# Patient Record
Sex: MALE | NOT HISPANIC OR LATINO | URBAN - METROPOLITAN AREA
[De-identification: names, ages, dates, MRNs, and addresses within clinical notes are randomized per-mention and may not be internally consistent; named-entity substitution may affect disease eponyms.]

---

## 2021-06-02 ENCOUNTER — TELEPHONE (OUTPATIENT)
Dept: GASTROENTEROLOGY | Facility: CLINIC | Age: 71
End: 2021-06-02

## 2021-06-08 RX ORDER — SODIUM, POTASSIUM,MAG SULFATES 17.5-3.13G
2 SOLUTION, RECONSTITUTED, ORAL ORAL TAKE AS DIRECTED
Qty: 354 ML | Refills: 0 | Status: SHIPPED | OUTPATIENT
Start: 2021-06-08

## 2021-06-24 ENCOUNTER — OUTSIDE FACILITY SERVICE (OUTPATIENT)
Dept: GASTROENTEROLOGY | Facility: CLINIC | Age: 71
End: 2021-06-24

## 2021-06-24 PROCEDURE — 88305 TISSUE EXAM BY PATHOLOGIST: CPT | Performed by: INTERNAL MEDICINE

## 2021-06-24 PROCEDURE — 45388 COLONOSCOPY W/ABLATION: CPT | Performed by: INTERNAL MEDICINE

## 2021-06-24 PROCEDURE — G0500 MOD SEDAT ENDO SERVICE >5YRS: HCPCS | Performed by: INTERNAL MEDICINE

## 2021-06-24 PROCEDURE — 45385 COLONOSCOPY W/LESION REMOVAL: CPT | Performed by: INTERNAL MEDICINE

## 2021-06-25 ENCOUNTER — LAB REQUISITION (OUTPATIENT)
Dept: LAB | Facility: HOSPITAL | Age: 71
End: 2021-06-25

## 2021-06-25 DIAGNOSIS — K57.30 DIVERTICULOSIS OF LARGE INTESTINE WITHOUT PERFORATION OR ABSCESS WITHOUT BLEEDING: ICD-10-CM

## 2021-06-25 DIAGNOSIS — D12.0 BENIGN NEOPLASM OF CECUM: ICD-10-CM

## 2021-06-25 DIAGNOSIS — Z12.11 ENCOUNTER FOR SCREENING FOR MALIGNANT NEOPLASM OF COLON: ICD-10-CM

## 2021-06-25 DIAGNOSIS — Z86.010 PERSONAL HISTORY OF COLONIC POLYPS: ICD-10-CM

## 2021-06-25 DIAGNOSIS — D12.4 BENIGN NEOPLASM OF DESCENDING COLON: ICD-10-CM

## 2021-06-28 ENCOUNTER — TELEPHONE (OUTPATIENT)
Dept: GASTROENTEROLOGY | Facility: CLINIC | Age: 71
End: 2021-06-28

## 2021-06-28 LAB
CYTO UR: NORMAL
LAB AP CASE REPORT: NORMAL
LAB AP CLINICAL INFORMATION: NORMAL
PATH REPORT.FINAL DX SPEC: NORMAL
PATH REPORT.GROSS SPEC: NORMAL

## 2021-06-28 NOTE — TELEPHONE ENCOUNTER
----- Message from Sergo Simpson MD sent at 6/28/2021 11:56 AM EDT -----  Please let Maxim know he had adenomas. Follow up in 5 years is recommended

## 2025-01-01 ENCOUNTER — HOSPITAL ENCOUNTER (INPATIENT)
Facility: HOSPITAL | Age: 75
LOS: 3 days | End: 2025-08-01
Attending: INTERNAL MEDICINE | Admitting: INTERNAL MEDICINE
Payer: COMMERCIAL

## 2025-01-01 ENCOUNTER — APPOINTMENT (OUTPATIENT)
Dept: CT IMAGING | Facility: HOSPITAL | Age: 75
DRG: 640 | End: 2025-01-01
Payer: MEDICARE

## 2025-01-01 ENCOUNTER — HOSPITAL ENCOUNTER (INPATIENT)
Facility: HOSPITAL | Age: 75
LOS: 8 days | Discharge: HOSPICE | DRG: 640 | End: 2025-07-29
Attending: EMERGENCY MEDICINE | Admitting: INTERNAL MEDICINE
Payer: MEDICARE

## 2025-01-01 ENCOUNTER — APPOINTMENT (OUTPATIENT)
Dept: GENERAL RADIOLOGY | Facility: HOSPITAL | Age: 75
DRG: 640 | End: 2025-01-01
Payer: MEDICARE

## 2025-01-01 VITALS
HEIGHT: 70 IN | TEMPERATURE: 99.6 F | DIASTOLIC BLOOD PRESSURE: 57 MMHG | OXYGEN SATURATION: 92 % | BODY MASS INDEX: 19.9 KG/M2 | HEART RATE: 78 BPM | WEIGHT: 139 LBS | RESPIRATION RATE: 16 BRPM | SYSTOLIC BLOOD PRESSURE: 80 MMHG

## 2025-01-01 VITALS — SYSTOLIC BLOOD PRESSURE: 105 MMHG | TEMPERATURE: 98.1 F | OXYGEN SATURATION: 94 % | DIASTOLIC BLOOD PRESSURE: 66 MMHG

## 2025-01-01 DIAGNOSIS — R41.841 COGNITIVE COMMUNICATION DEFICIT: ICD-10-CM

## 2025-01-01 DIAGNOSIS — S20.211A HEMATOMA OF RIGHT CHEST WALL, INITIAL ENCOUNTER: ICD-10-CM

## 2025-01-01 DIAGNOSIS — R29.6 FREQUENT FALLS: ICD-10-CM

## 2025-01-01 DIAGNOSIS — R13.10 DYSPHAGIA, UNSPECIFIED TYPE: ICD-10-CM

## 2025-01-01 DIAGNOSIS — R60.0 LEG EDEMA: ICD-10-CM

## 2025-01-01 DIAGNOSIS — J90 PLEURAL EFFUSION: ICD-10-CM

## 2025-01-01 DIAGNOSIS — D64.9 ANEMIA, UNSPECIFIED TYPE: Primary | ICD-10-CM

## 2025-01-01 DIAGNOSIS — K62.89 PROCTITIS: ICD-10-CM

## 2025-01-01 LAB
ABO GROUP BLD: NORMAL
ABO GROUP BLD: NORMAL
ALBUMIN SERPL-MCNC: 2.3 G/DL (ref 3.5–5.2)
ALBUMIN SERPL-MCNC: 2.6 G/DL (ref 3.5–5.2)
ALBUMIN SERPL-MCNC: 2.7 G/DL (ref 3.5–5.2)
ALBUMIN/GLOB SERPL: 1 G/DL
ALBUMIN/GLOB SERPL: 1.1 G/DL
ALBUMIN/GLOB SERPL: 1.1 G/DL
ALP SERPL-CCNC: 51 U/L (ref 39–117)
ALP SERPL-CCNC: 61 U/L (ref 39–117)
ALP SERPL-CCNC: 61 U/L (ref 39–117)
ALT SERPL W P-5'-P-CCNC: 30 U/L (ref 1–41)
ALT SERPL W P-5'-P-CCNC: 30 U/L (ref 1–41)
ALT SERPL W P-5'-P-CCNC: 41 U/L (ref 1–41)
ANION GAP SERPL CALCULATED.3IONS-SCNC: 10 MMOL/L (ref 5–15)
ANION GAP SERPL CALCULATED.3IONS-SCNC: 6 MMOL/L (ref 5–15)
ANION GAP SERPL CALCULATED.3IONS-SCNC: 6 MMOL/L (ref 5–15)
ANION GAP SERPL CALCULATED.3IONS-SCNC: 6.9 MMOL/L (ref 5–15)
ANION GAP SERPL CALCULATED.3IONS-SCNC: 7 MMOL/L (ref 5–15)
ANION GAP SERPL CALCULATED.3IONS-SCNC: 9.2 MMOL/L (ref 5–15)
ARTERIAL PATENCY WRIST A: POSITIVE
AST SERPL-CCNC: 33 U/L (ref 1–40)
AST SERPL-CCNC: 35 U/L (ref 1–40)
AST SERPL-CCNC: 45 U/L (ref 1–40)
ATMOSPHERIC PRESS: ABNORMAL MM[HG]
B PARAPERT DNA SPEC QL NAA+PROBE: NOT DETECTED
B PERT DNA SPEC QL NAA+PROBE: NOT DETECTED
BACTERIA UR QL AUTO: ABNORMAL /HPF
BASE EXCESS BLDA CALC-SCNC: 1.5 MMOL/L (ref 0–2)
BASOPHILS # BLD AUTO: 0.03 10*3/MM3 (ref 0–0.2)
BASOPHILS # BLD AUTO: 0.05 10*3/MM3 (ref 0–0.2)
BASOPHILS NFR BLD AUTO: 0.3 % (ref 0–1.5)
BASOPHILS NFR BLD AUTO: 0.5 % (ref 0–1.5)
BASOPHILS NFR BLD AUTO: 0.5 % (ref 0–1.5)
BASOPHILS NFR BLD AUTO: 0.6 % (ref 0–1.5)
BDY SITE: ABNORMAL
BH BB BLOOD EXPIRATION DATE: NORMAL
BH BB BLOOD EXPIRATION DATE: NORMAL
BH BB BLOOD TYPE BARCODE: 5100
BH BB BLOOD TYPE BARCODE: 5100
BH BB DISPENSE STATUS: NORMAL
BH BB DISPENSE STATUS: NORMAL
BH BB PRODUCT CODE: NORMAL
BH BB PRODUCT CODE: NORMAL
BH BB UNIT NUMBER: NORMAL
BH BB UNIT NUMBER: NORMAL
BILIRUB SERPL-MCNC: 0.6 MG/DL (ref 0–1.2)
BILIRUB SERPL-MCNC: 0.6 MG/DL (ref 0–1.2)
BILIRUB SERPL-MCNC: 0.8 MG/DL (ref 0–1.2)
BILIRUB UR QL STRIP: NEGATIVE
BLD GP AB SCN SERPL QL: NEGATIVE
BODY TEMPERATURE: 37
BUN SERPL-MCNC: 12 MG/DL (ref 8–23)
BUN SERPL-MCNC: 14.4 MG/DL (ref 8–23)
BUN SERPL-MCNC: 15.1 MG/DL (ref 8–23)
BUN SERPL-MCNC: 8.5 MG/DL (ref 8–23)
BUN SERPL-MCNC: 8.7 MG/DL (ref 8–23)
BUN SERPL-MCNC: 8.9 MG/DL (ref 8–23)
BUN/CREAT SERPL: 10.9 (ref 7–25)
BUN/CREAT SERPL: 11.5 (ref 7–25)
BUN/CREAT SERPL: 13.3 (ref 7–25)
BUN/CREAT SERPL: 16.2 (ref 7–25)
BUN/CREAT SERPL: 18.6 (ref 7–25)
BUN/CREAT SERPL: 18.9 (ref 7–25)
C PNEUM DNA NPH QL NAA+NON-PROBE: NOT DETECTED
CALCIUM SPEC-SCNC: 7.8 MG/DL (ref 8.6–10.5)
CALCIUM SPEC-SCNC: 7.9 MG/DL (ref 8.6–10.5)
CALCIUM SPEC-SCNC: 8 MG/DL (ref 8.6–10.5)
CALCIUM SPEC-SCNC: 8.3 MG/DL (ref 8.6–10.5)
CALCIUM SPEC-SCNC: 8.4 MG/DL (ref 8.6–10.5)
CALCIUM SPEC-SCNC: 8.7 MG/DL (ref 8.6–10.5)
CHLORIDE SERPL-SCNC: 109 MMOL/L (ref 98–107)
CHLORIDE SERPL-SCNC: 114 MMOL/L (ref 98–107)
CHLORIDE SERPL-SCNC: 117 MMOL/L (ref 98–107)
CHLORIDE SERPL-SCNC: 117 MMOL/L (ref 98–107)
CHLORIDE SERPL-SCNC: 118 MMOL/L (ref 98–107)
CHLORIDE SERPL-SCNC: 119 MMOL/L (ref 98–107)
CK SERPL-CCNC: 702 U/L (ref 20–200)
CLARITY UR: CLEAR
CO2 BLDA-SCNC: 26.6 MMOL/L (ref 22–33)
CO2 SERPL-SCNC: 20 MMOL/L (ref 22–29)
CO2 SERPL-SCNC: 21 MMOL/L (ref 22–29)
CO2 SERPL-SCNC: 22.1 MMOL/L (ref 22–29)
CO2 SERPL-SCNC: 24 MMOL/L (ref 22–29)
CO2 SERPL-SCNC: 24.8 MMOL/L (ref 22–29)
CO2 SERPL-SCNC: 26 MMOL/L (ref 22–29)
COHGB MFR BLD: 1.7 % (ref 0–2)
COLOR UR: YELLOW
CREAT SERPL-MCNC: 0.67 MG/DL (ref 0.76–1.27)
CREAT SERPL-MCNC: 0.74 MG/DL (ref 0.76–1.27)
CREAT SERPL-MCNC: 0.74 MG/DL (ref 0.76–1.27)
CREAT SERPL-MCNC: 0.76 MG/DL (ref 0.76–1.27)
CREAT SERPL-MCNC: 0.8 MG/DL (ref 0.76–1.27)
CREAT SERPL-MCNC: 0.81 MG/DL (ref 0.76–1.27)
CROSSMATCH INTERPRETATION: NORMAL
CROSSMATCH INTERPRETATION: NORMAL
CRP SERPL-MCNC: 6.42 MG/DL (ref 0–0.5)
D DIMER PPP FEU-MCNC: 1.91 MCGFEU/ML (ref 0–0.76)
D-LACTATE SERPL-SCNC: 0.9 MMOL/L (ref 0.5–2)
D-LACTATE SERPL-SCNC: 1.2 MMOL/L (ref 0.5–2)
DEPRECATED RDW RBC AUTO: 53.3 FL (ref 37–54)
DEPRECATED RDW RBC AUTO: 55.1 FL (ref 37–54)
DEPRECATED RDW RBC AUTO: 55.8 FL (ref 37–54)
DEPRECATED RDW RBC AUTO: 56.8 FL (ref 37–54)
DEPRECATED RDW RBC AUTO: 57.3 FL (ref 37–54)
DEVELOPER EXPIRATION DATE: NORMAL
DEVELOPER LOT NUMBER: NORMAL
EGFRCR SERPLBLD CKD-EPI 2021: 91.4 ML/MIN/1.73
EGFRCR SERPLBLD CKD-EPI 2021: 91.7 ML/MIN/1.73
EGFRCR SERPLBLD CKD-EPI 2021: 93.2 ML/MIN/1.73
EGFRCR SERPLBLD CKD-EPI 2021: 93.9 ML/MIN/1.73
EGFRCR SERPLBLD CKD-EPI 2021: 93.9 ML/MIN/1.73
EGFRCR SERPLBLD CKD-EPI 2021: 96.8 ML/MIN/1.73
EOSINOPHIL # BLD AUTO: 0.05 10*3/MM3 (ref 0–0.4)
EOSINOPHIL # BLD AUTO: 0.09 10*3/MM3 (ref 0–0.4)
EOSINOPHIL # BLD AUTO: 0.09 10*3/MM3 (ref 0–0.4)
EOSINOPHIL # BLD AUTO: 0.11 10*3/MM3 (ref 0–0.4)
EOSINOPHIL NFR BLD AUTO: 0.6 % (ref 0.3–6.2)
EOSINOPHIL NFR BLD AUTO: 1.2 % (ref 0.3–6.2)
EOSINOPHIL NFR BLD AUTO: 1.4 % (ref 0.3–6.2)
EOSINOPHIL NFR BLD AUTO: 1.6 % (ref 0.3–6.2)
EPAP: 0
ERYTHROCYTE [DISTWIDTH] IN BLOOD BY AUTOMATED COUNT: 14.6 % (ref 12.3–15.4)
ERYTHROCYTE [DISTWIDTH] IN BLOOD BY AUTOMATED COUNT: 14.7 % (ref 12.3–15.4)
ERYTHROCYTE [DISTWIDTH] IN BLOOD BY AUTOMATED COUNT: 15.2 % (ref 12.3–15.4)
ERYTHROCYTE [DISTWIDTH] IN BLOOD BY AUTOMATED COUNT: 15.9 % (ref 12.3–15.4)
ERYTHROCYTE [DISTWIDTH] IN BLOOD BY AUTOMATED COUNT: 16.1 % (ref 12.3–15.4)
EXPIRATION DATE: NORMAL
FECAL OCCULT BLOOD SCREEN, POC: NEGATIVE
FERRITIN SERPL-MCNC: 248 NG/ML (ref 30–400)
FLUAV SUBTYP SPEC NAA+PROBE: NOT DETECTED
FLUBV RNA NPH QL NAA+NON-PROBE: NOT DETECTED
FOLATE SERPL-MCNC: 4.19 NG/ML (ref 4.78–24.2)
GEN 5 1HR TROPONIN T REFLEX: 115 NG/L
GLOBULIN UR ELPH-MCNC: 2.1 GM/DL
GLOBULIN UR ELPH-MCNC: 2.5 GM/DL
GLOBULIN UR ELPH-MCNC: 2.5 GM/DL
GLUCOSE BLDC GLUCOMTR-MCNC: 104 MG/DL (ref 70–130)
GLUCOSE BLDC GLUCOMTR-MCNC: 108 MG/DL (ref 70–130)
GLUCOSE BLDC GLUCOMTR-MCNC: 151 MG/DL (ref 70–130)
GLUCOSE BLDC GLUCOMTR-MCNC: 63 MG/DL (ref 70–130)
GLUCOSE BLDC GLUCOMTR-MCNC: 68 MG/DL (ref 70–130)
GLUCOSE BLDC GLUCOMTR-MCNC: 69 MG/DL (ref 70–130)
GLUCOSE BLDC GLUCOMTR-MCNC: 70 MG/DL (ref 70–130)
GLUCOSE BLDC GLUCOMTR-MCNC: 70 MG/DL (ref 70–130)
GLUCOSE BLDC GLUCOMTR-MCNC: 72 MG/DL (ref 70–130)
GLUCOSE BLDC GLUCOMTR-MCNC: 73 MG/DL (ref 70–130)
GLUCOSE BLDC GLUCOMTR-MCNC: 77 MG/DL (ref 70–130)
GLUCOSE BLDC GLUCOMTR-MCNC: 77 MG/DL (ref 70–130)
GLUCOSE BLDC GLUCOMTR-MCNC: 79 MG/DL (ref 70–130)
GLUCOSE BLDC GLUCOMTR-MCNC: 79 MG/DL (ref 70–130)
GLUCOSE BLDC GLUCOMTR-MCNC: 80 MG/DL (ref 70–130)
GLUCOSE BLDC GLUCOMTR-MCNC: 88 MG/DL (ref 70–130)
GLUCOSE BLDC GLUCOMTR-MCNC: 93 MG/DL (ref 70–130)
GLUCOSE BLDC GLUCOMTR-MCNC: 95 MG/DL (ref 70–130)
GLUCOSE BLDC GLUCOMTR-MCNC: 99 MG/DL (ref 70–130)
GLUCOSE SERPL-MCNC: 101 MG/DL (ref 65–99)
GLUCOSE SERPL-MCNC: 78 MG/DL (ref 65–99)
GLUCOSE SERPL-MCNC: 86 MG/DL (ref 65–99)
GLUCOSE SERPL-MCNC: 90 MG/DL (ref 65–99)
GLUCOSE SERPL-MCNC: 96 MG/DL (ref 65–99)
GLUCOSE SERPL-MCNC: 97 MG/DL (ref 65–99)
GLUCOSE UR STRIP-MCNC: NEGATIVE MG/DL
HADV DNA SPEC NAA+PROBE: NOT DETECTED
HCO3 BLDA-SCNC: 25.4 MMOL/L (ref 20–26)
HCOV 229E RNA SPEC QL NAA+PROBE: NOT DETECTED
HCOV HKU1 RNA SPEC QL NAA+PROBE: NOT DETECTED
HCOV NL63 RNA SPEC QL NAA+PROBE: NOT DETECTED
HCOV OC43 RNA SPEC QL NAA+PROBE: NOT DETECTED
HCT VFR BLD AUTO: 22.1 % (ref 37.5–51)
HCT VFR BLD AUTO: 22.3 % (ref 37.5–51)
HCT VFR BLD AUTO: 22.6 % (ref 37.5–51)
HCT VFR BLD AUTO: 25.8 % (ref 37.5–51)
HCT VFR BLD AUTO: 27 % (ref 37.5–51)
HCT VFR BLD AUTO: 27.5 % (ref 37.5–51)
HCT VFR BLD AUTO: 28.3 % (ref 37.5–51)
HCT VFR BLD AUTO: 28.9 % (ref 37.5–51)
HCT VFR BLD AUTO: 29.2 % (ref 37.5–51)
HCT VFR BLD AUTO: 29.3 % (ref 37.5–51)
HCT VFR BLD AUTO: 29.4 % (ref 37.5–51)
HCT VFR BLD AUTO: 29.7 % (ref 37.5–51)
HCT VFR BLD AUTO: 30.8 % (ref 37.5–51)
HCT VFR BLD AUTO: 31.2 % (ref 37.5–51)
HCT VFR BLD AUTO: 31.3 % (ref 37.5–51)
HCT VFR BLD AUTO: 32 % (ref 37.5–51)
HCT VFR BLD AUTO: 32.2 % (ref 37.5–51)
HCT VFR BLD AUTO: 33 % (ref 37.5–51)
HCT VFR BLD AUTO: 33.5 % (ref 37.5–51)
HCT VFR BLD AUTO: 34.2 % (ref 37.5–51)
HCT VFR BLD CALC: 27.7 % (ref 38–51)
HGB BLD-MCNC: 10 G/DL (ref 13–17.7)
HGB BLD-MCNC: 10.2 G/DL (ref 13–17.7)
HGB BLD-MCNC: 10.6 G/DL (ref 13–17.7)
HGB BLD-MCNC: 10.7 G/DL (ref 13–17.7)
HGB BLD-MCNC: 10.9 G/DL (ref 13–17.7)
HGB BLD-MCNC: 11.1 G/DL (ref 13–17.7)
HGB BLD-MCNC: 6.8 G/DL (ref 13–17.7)
HGB BLD-MCNC: 6.9 G/DL (ref 13–17.7)
HGB BLD-MCNC: 6.9 G/DL (ref 13–17.7)
HGB BLD-MCNC: 8.2 G/DL (ref 13–17.7)
HGB BLD-MCNC: 8.4 G/DL (ref 13–17.7)
HGB BLD-MCNC: 8.8 G/DL (ref 13–17.7)
HGB BLD-MCNC: 9.2 G/DL (ref 13–17.7)
HGB BLD-MCNC: 9.2 G/DL (ref 13–17.7)
HGB BLD-MCNC: 9.4 G/DL (ref 13–17.7)
HGB BLD-MCNC: 9.5 G/DL (ref 13–17.7)
HGB BLD-MCNC: 9.6 G/DL (ref 13–17.7)
HGB BLD-MCNC: 9.7 G/DL (ref 13–17.7)
HGB BLDA-MCNC: 9.1 G/DL (ref 13.5–17.5)
HGB UR QL STRIP.AUTO: NEGATIVE
HMPV RNA NPH QL NAA+NON-PROBE: NOT DETECTED
HPIV1 RNA ISLT QL NAA+PROBE: NOT DETECTED
HPIV2 RNA SPEC QL NAA+PROBE: NOT DETECTED
HPIV3 RNA NPH QL NAA+PROBE: NOT DETECTED
HPIV4 P GENE NPH QL NAA+PROBE: NOT DETECTED
HYALINE CASTS UR QL AUTO: ABNORMAL /LPF
IMM GRANULOCYTES # BLD AUTO: 0.02 10*3/MM3 (ref 0–0.05)
IMM GRANULOCYTES # BLD AUTO: 0.03 10*3/MM3 (ref 0–0.05)
IMM GRANULOCYTES # BLD AUTO: 0.04 10*3/MM3 (ref 0–0.05)
IMM GRANULOCYTES # BLD AUTO: 0.04 10*3/MM3 (ref 0–0.05)
IMM GRANULOCYTES NFR BLD AUTO: 0.4 % (ref 0–0.5)
IMM GRANULOCYTES NFR BLD AUTO: 0.4 % (ref 0–0.5)
IMM GRANULOCYTES NFR BLD AUTO: 0.5 % (ref 0–0.5)
IMM GRANULOCYTES NFR BLD AUTO: 0.5 % (ref 0–0.5)
INHALED O2 CONCENTRATION: 44 %
IPAP: 0
IRON 24H UR-MRATE: 14 MCG/DL (ref 59–158)
IRON 24H UR-MRATE: 17 MCG/DL (ref 59–158)
IRON SATN MFR SERPL: 7 % (ref 20–50)
KETONES UR QL STRIP: ABNORMAL
LEUKOCYTE ESTERASE UR QL STRIP.AUTO: ABNORMAL
LYMPHOCYTES # BLD AUTO: 0.68 10*3/MM3 (ref 0.7–3.1)
LYMPHOCYTES # BLD AUTO: 0.72 10*3/MM3 (ref 0.7–3.1)
LYMPHOCYTES # BLD AUTO: 0.72 10*3/MM3 (ref 0.7–3.1)
LYMPHOCYTES # BLD AUTO: 0.74 10*3/MM3 (ref 0.7–3.1)
LYMPHOCYTES NFR BLD AUTO: 11.3 % (ref 19.6–45.3)
LYMPHOCYTES NFR BLD AUTO: 12.7 % (ref 19.6–45.3)
LYMPHOCYTES NFR BLD AUTO: 8.1 % (ref 19.6–45.3)
LYMPHOCYTES NFR BLD AUTO: 8.1 % (ref 19.6–45.3)
Lab: NORMAL
M PNEUMO IGG SER IA-ACNC: NOT DETECTED
MAGNESIUM SERPL-MCNC: 2.2 MG/DL (ref 1.6–2.4)
MAGNESIUM SERPL-MCNC: 2.5 MG/DL (ref 1.6–2.4)
MCH RBC QN AUTO: 30.5 PG (ref 26.6–33)
MCH RBC QN AUTO: 31 PG (ref 26.6–33)
MCH RBC QN AUTO: 31.1 PG (ref 26.6–33)
MCH RBC QN AUTO: 31.2 PG (ref 26.6–33)
MCH RBC QN AUTO: 31.8 PG (ref 26.6–33)
MCHC RBC AUTO-ENTMCNC: 30.1 G/DL (ref 31.5–35.7)
MCHC RBC AUTO-ENTMCNC: 31.1 G/DL (ref 31.5–35.7)
MCHC RBC AUTO-ENTMCNC: 31.5 G/DL (ref 31.5–35.7)
MCHC RBC AUTO-ENTMCNC: 32 G/DL (ref 31.5–35.7)
MCHC RBC AUTO-ENTMCNC: 32.5 G/DL (ref 31.5–35.7)
MCV RBC AUTO: 102.3 FL (ref 79–97)
MCV RBC AUTO: 103.7 FL (ref 79–97)
MCV RBC AUTO: 95.5 FL (ref 79–97)
MCV RBC AUTO: 96.9 FL (ref 79–97)
MCV RBC AUTO: 97.2 FL (ref 79–97)
METHGB BLD QL: 0.3 % (ref 0–1.5)
MODALITY: ABNORMAL
MONOCYTES # BLD AUTO: 0.73 10*3/MM3 (ref 0.1–0.9)
MONOCYTES # BLD AUTO: 0.74 10*3/MM3 (ref 0.1–0.9)
MONOCYTES # BLD AUTO: 0.85 10*3/MM3 (ref 0.1–0.9)
MONOCYTES # BLD AUTO: 0.85 10*3/MM3 (ref 0.1–0.9)
MONOCYTES NFR BLD AUTO: 10.2 % (ref 5–12)
MONOCYTES NFR BLD AUTO: 11.5 % (ref 5–12)
MONOCYTES NFR BLD AUTO: 13 % (ref 5–12)
MONOCYTES NFR BLD AUTO: 9.3 % (ref 5–12)
NEGATIVE CONTROL: NEGATIVE
NEUTROPHILS NFR BLD AUTO: 4.08 10*3/MM3 (ref 1.7–7)
NEUTROPHILS NFR BLD AUTO: 4.76 10*3/MM3 (ref 1.7–7)
NEUTROPHILS NFR BLD AUTO: 6.68 10*3/MM3 (ref 1.7–7)
NEUTROPHILS NFR BLD AUTO: 7.41 10*3/MM3 (ref 1.7–7)
NEUTROPHILS NFR BLD AUTO: 71.8 % (ref 42.7–76)
NEUTROPHILS NFR BLD AUTO: 74.8 % (ref 42.7–76)
NEUTROPHILS NFR BLD AUTO: 80 % (ref 42.7–76)
NEUTROPHILS NFR BLD AUTO: 80.7 % (ref 42.7–76)
NITRITE UR QL STRIP: NEGATIVE
NRBC BLD AUTO-RTO: 0 /100 WBC (ref 0–0.2)
NT-PROBNP SERPL-MCNC: 345 PG/ML (ref 0–1800)
OXYHGB MFR BLDV: 85.9 % (ref 94–99)
PAW @ PEAK INSP FLOW SETTING VENT: 0 CMH2O
PCO2 BLDA: 36.5 MM HG (ref 35–45)
PCO2 TEMP ADJ BLD: 36.5 MM HG (ref 35–48)
PH BLDA: 7.45 PH UNITS (ref 7.35–7.45)
PH UR STRIP.AUTO: 5.5 [PH] (ref 5–8)
PH, TEMP CORRECTED: 7.45 PH UNITS
PLATELET # BLD AUTO: 197 10*3/MM3 (ref 140–450)
PLATELET # BLD AUTO: 205 10*3/MM3 (ref 140–450)
PLATELET # BLD AUTO: 226 10*3/MM3 (ref 140–450)
PLATELET # BLD AUTO: 244 10*3/MM3 (ref 140–450)
PLATELET # BLD AUTO: 251 10*3/MM3 (ref 140–450)
PMV BLD AUTO: 9.2 FL (ref 6–12)
PMV BLD AUTO: 9.3 FL (ref 6–12)
PMV BLD AUTO: 9.6 FL (ref 6–12)
PMV BLD AUTO: 9.7 FL (ref 6–12)
PMV BLD AUTO: 9.7 FL (ref 6–12)
PO2 BLDA: 51.4 MM HG (ref 83–108)
PO2 TEMP ADJ BLD: 51.4 MM HG (ref 83–108)
POSITIVE CONTROL: POSITIVE
POTASSIUM SERPL-SCNC: 3.1 MMOL/L (ref 3.5–5.2)
POTASSIUM SERPL-SCNC: 3.2 MMOL/L (ref 3.5–5.2)
POTASSIUM SERPL-SCNC: 3.5 MMOL/L (ref 3.5–5.2)
POTASSIUM SERPL-SCNC: 3.7 MMOL/L (ref 3.5–5.2)
POTASSIUM SERPL-SCNC: 4.1 MMOL/L (ref 3.5–5.2)
POTASSIUM SERPL-SCNC: 4.6 MMOL/L (ref 3.5–5.2)
POTASSIUM SERPL-SCNC: 4.8 MMOL/L (ref 3.5–5.2)
PROCALCITONIN SERPL-MCNC: 0.11 NG/ML (ref 0–0.25)
PROCALCITONIN SERPL-MCNC: 0.12 NG/ML (ref 0–0.25)
PROT SERPL-MCNC: 4.4 G/DL (ref 6–8.5)
PROT SERPL-MCNC: 5.1 G/DL (ref 6–8.5)
PROT SERPL-MCNC: 5.2 G/DL (ref 6–8.5)
PROT UR QL STRIP: ABNORMAL
QT INTERVAL: 338 MS
QT INTERVAL: 418 MS
QTC INTERVAL: 359 MS
QTC INTERVAL: 420 MS
RBC # BLD AUTO: 2.18 10*6/MM3 (ref 4.14–5.8)
RBC # BLD AUTO: 2.64 10*6/MM3 (ref 4.14–5.8)
RBC # BLD AUTO: 2.83 10*6/MM3 (ref 4.14–5.8)
RBC # BLD AUTO: 3.18 10*6/MM3 (ref 4.14–5.8)
RBC # BLD AUTO: 3.58 10*6/MM3 (ref 4.14–5.8)
RBC # UR STRIP: ABNORMAL /HPF
REF LAB TEST METHOD: ABNORMAL
RETICS # AUTO: 0.06 10*6/MM3 (ref 0.02–0.13)
RETICS/RBC NFR AUTO: 2.37 % (ref 0.7–1.9)
RH BLD: POSITIVE
RH BLD: POSITIVE
RHINOVIRUS RNA SPEC NAA+PROBE: NOT DETECTED
RSV RNA NPH QL NAA+NON-PROBE: NOT DETECTED
SARS-COV-2 RNA RESP QL NAA+PROBE: NOT DETECTED
SODIUM SERPL-SCNC: 137 MMOL/L (ref 136–145)
SODIUM SERPL-SCNC: 137 MMOL/L (ref 136–145)
SODIUM SERPL-SCNC: 138 MMOL/L (ref 136–145)
SODIUM SERPL-SCNC: 139 MMOL/L (ref 136–145)
SODIUM SERPL-SCNC: 140 MMOL/L (ref 136–145)
SODIUM SERPL-SCNC: 142 MMOL/L (ref 136–145)
SODIUM SERPL-SCNC: 143 MMOL/L (ref 136–145)
SODIUM SERPL-SCNC: 144 MMOL/L (ref 136–145)
SODIUM SERPL-SCNC: 145 MMOL/L (ref 136–145)
SODIUM SERPL-SCNC: 145 MMOL/L (ref 136–145)
SODIUM SERPL-SCNC: 147 MMOL/L (ref 136–145)
SODIUM SERPL-SCNC: 148 MMOL/L (ref 136–145)
SODIUM SERPL-SCNC: 148 MMOL/L (ref 136–145)
SODIUM SERPL-SCNC: 149 MMOL/L (ref 136–145)
SODIUM SERPL-SCNC: 152 MMOL/L (ref 136–145)
SP GR UR STRIP: 1.02 (ref 1–1.03)
SQUAMOUS #/AREA URNS HPF: ABNORMAL /HPF
T&S EXPIRATION DATE: NORMAL
T4 FREE SERPL-MCNC: 1.21 NG/DL (ref 0.92–1.68)
TIBC SERPL-MCNC: 188 MCG/DL (ref 298–536)
TOTAL RATE: 0 BREATHS/MINUTE
TRANSFERRIN SERPL-MCNC: 126 MG/DL (ref 200–360)
TROPONIN T % DELTA: -19
TROPONIN T NUMERIC DELTA: -27 NG/L
TROPONIN T SERPL HS-MCNC: 142 NG/L
TSH SERPL DL<=0.05 MIU/L-ACNC: 17.6 UIU/ML (ref 0.27–4.2)
UNIT  ABO: NORMAL
UNIT  ABO: NORMAL
UNIT  RH: NORMAL
UNIT  RH: NORMAL
UROBILINOGEN UR QL STRIP: ABNORMAL
VIT B12 BLD-MCNC: 1385 PG/ML (ref 211–946)
WBC # UR STRIP: ABNORMAL /HPF
WBC NRBC COR # BLD AUTO: 5.68 10*3/MM3 (ref 3.4–10.8)
WBC NRBC COR # BLD AUTO: 6.36 10*3/MM3 (ref 3.4–10.8)
WBC NRBC COR # BLD AUTO: 8.35 10*3/MM3 (ref 3.4–10.8)
WBC NRBC COR # BLD AUTO: 9.18 10*3/MM3 (ref 3.4–10.8)
WBC NRBC COR # BLD AUTO: 9.76 10*3/MM3 (ref 3.4–10.8)

## 2025-01-01 PROCEDURE — 82728 ASSAY OF FERRITIN: CPT | Performed by: NURSE PRACTITIONER

## 2025-01-01 PROCEDURE — 85018 HEMOGLOBIN: CPT | Performed by: NURSE PRACTITIONER

## 2025-01-01 PROCEDURE — 83605 ASSAY OF LACTIC ACID: CPT | Performed by: NURSE PRACTITIONER

## 2025-01-01 PROCEDURE — 94799 UNLISTED PULMONARY SVC/PX: CPT

## 2025-01-01 PROCEDURE — 25010000003 DEXTROSE 5 % SOLUTION: Performed by: INTERNAL MEDICINE

## 2025-01-01 PROCEDURE — 85045 AUTOMATED RETICULOCYTE COUNT: CPT | Performed by: NURSE PRACTITIONER

## 2025-01-01 PROCEDURE — 84439 ASSAY OF FREE THYROXINE: CPT | Performed by: NURSE PRACTITIONER

## 2025-01-01 PROCEDURE — 86900 BLOOD TYPING SEROLOGIC ABO: CPT

## 2025-01-01 PROCEDURE — 25010000002 GLYCOPYRROLATE 0.2 MG/ML SOLUTION: Performed by: NURSE PRACTITIONER

## 2025-01-01 PROCEDURE — 86923 COMPATIBILITY TEST ELECTRIC: CPT

## 2025-01-01 PROCEDURE — 92523 SPEECH SOUND LANG COMPREHEN: CPT

## 2025-01-01 PROCEDURE — 93005 ELECTROCARDIOGRAM TRACING: CPT | Performed by: NURSE PRACTITIONER

## 2025-01-01 PROCEDURE — 25010000002 PIPERACILLIN SOD-TAZOBACTAM PER 1 G: Performed by: STUDENT IN AN ORGANIZED HEALTH CARE EDUCATION/TRAINING PROGRAM

## 2025-01-01 PROCEDURE — 85379 FIBRIN DEGRADATION QUANT: CPT | Performed by: STUDENT IN AN ORGANIZED HEALTH CARE EDUCATION/TRAINING PROGRAM

## 2025-01-01 PROCEDURE — 25010000002 MIDAZOLAM PER 1 MG: Performed by: FAMILY MEDICINE

## 2025-01-01 PROCEDURE — 84145 PROCALCITONIN (PCT): CPT | Performed by: NURSE PRACTITIONER

## 2025-01-01 PROCEDURE — 99232 SBSQ HOSP IP/OBS MODERATE 35: CPT | Performed by: HOSPITALIST

## 2025-01-01 PROCEDURE — 0202U NFCT DS 22 TRGT SARS-COV-2: CPT | Performed by: NURSE PRACTITIONER

## 2025-01-01 PROCEDURE — 85014 HEMATOCRIT: CPT | Performed by: NURSE PRACTITIONER

## 2025-01-01 PROCEDURE — 82948 REAGENT STRIP/BLOOD GLUCOSE: CPT

## 2025-01-01 PROCEDURE — 86140 C-REACTIVE PROTEIN: CPT | Performed by: NURSE PRACTITIONER

## 2025-01-01 PROCEDURE — 25010000002 POTASSIUM CHLORIDE 10 MEQ/100ML SOLUTION: Performed by: HOSPITALIST

## 2025-01-01 PROCEDURE — 25010000002 MORPHINE PER 10 MG: Performed by: INTERNAL MEDICINE

## 2025-01-01 PROCEDURE — 84145 PROCALCITONIN (PCT): CPT | Performed by: STUDENT IN AN ORGANIZED HEALTH CARE EDUCATION/TRAINING PROGRAM

## 2025-01-01 PROCEDURE — 81001 URINALYSIS AUTO W/SCOPE: CPT | Performed by: NURSE PRACTITIONER

## 2025-01-01 PROCEDURE — 85025 COMPLETE CBC W/AUTO DIFF WBC: CPT | Performed by: INTERNAL MEDICINE

## 2025-01-01 PROCEDURE — 86850 RBC ANTIBODY SCREEN: CPT | Performed by: NURSE PRACTITIONER

## 2025-01-01 PROCEDURE — 83735 ASSAY OF MAGNESIUM: CPT | Performed by: NURSE PRACTITIONER

## 2025-01-01 PROCEDURE — 84295 ASSAY OF SERUM SODIUM: CPT | Performed by: FAMILY MEDICINE

## 2025-01-01 PROCEDURE — 70450 CT HEAD/BRAIN W/O DYE: CPT

## 2025-01-01 PROCEDURE — 82375 ASSAY CARBOXYHB QUANT: CPT

## 2025-01-01 PROCEDURE — 99232 SBSQ HOSP IP/OBS MODERATE 35: CPT | Performed by: INTERNAL MEDICINE

## 2025-01-01 PROCEDURE — 25010000002 MORPHINE PER 10 MG: Performed by: NURSE PRACTITIONER

## 2025-01-01 PROCEDURE — 25010000002 FUROSEMIDE PER 20 MG: Performed by: NURSE PRACTITIONER

## 2025-01-01 PROCEDURE — 92610 EVALUATE SWALLOWING FUNCTION: CPT

## 2025-01-01 PROCEDURE — 71250 CT THORAX DX C-: CPT

## 2025-01-01 PROCEDURE — 82607 VITAMIN B-12: CPT | Performed by: NURSE PRACTITIONER

## 2025-01-01 PROCEDURE — 86901 BLOOD TYPING SEROLOGIC RH(D): CPT | Performed by: NURSE PRACTITIONER

## 2025-01-01 PROCEDURE — 99233 SBSQ HOSP IP/OBS HIGH 50: CPT | Performed by: INTERNAL MEDICINE

## 2025-01-01 PROCEDURE — P9016 RBC LEUKOCYTES REDUCED: HCPCS

## 2025-01-01 PROCEDURE — 82805 BLOOD GASES W/O2 SATURATION: CPT

## 2025-01-01 PROCEDURE — 25010000002 GLYCOPYRROLATE 0.2 MG/ML SOLUTION: Performed by: FAMILY MEDICINE

## 2025-01-01 PROCEDURE — 83880 ASSAY OF NATRIURETIC PEPTIDE: CPT | Performed by: NURSE PRACTITIONER

## 2025-01-01 PROCEDURE — 99238 HOSP IP/OBS DSCHRG MGMT 30/<: CPT | Performed by: INTERNAL MEDICINE

## 2025-01-01 PROCEDURE — 36415 COLL VENOUS BLD VENIPUNCTURE: CPT

## 2025-01-01 PROCEDURE — 99231 SBSQ HOSP IP/OBS SF/LOW 25: CPT | Performed by: INTERNAL MEDICINE

## 2025-01-01 PROCEDURE — 85025 COMPLETE CBC W/AUTO DIFF WBC: CPT | Performed by: NURSE PRACTITIONER

## 2025-01-01 PROCEDURE — 83050 HGB METHEMOGLOBIN QUAN: CPT

## 2025-01-01 PROCEDURE — 36430 TRANSFUSION BLD/BLD COMPNT: CPT

## 2025-01-01 PROCEDURE — 80048 BASIC METABOLIC PNL TOTAL CA: CPT | Performed by: INTERNAL MEDICINE

## 2025-01-01 PROCEDURE — 86901 BLOOD TYPING SEROLOGIC RH(D): CPT

## 2025-01-01 PROCEDURE — 99223 1ST HOSP IP/OBS HIGH 75: CPT | Performed by: NURSE PRACTITIONER

## 2025-01-01 PROCEDURE — 82270 OCCULT BLOOD FECES: CPT | Performed by: NURSE PRACTITIONER

## 2025-01-01 PROCEDURE — 71045 X-RAY EXAM CHEST 1 VIEW: CPT

## 2025-01-01 PROCEDURE — P9612 CATHETERIZE FOR URINE SPEC: HCPCS

## 2025-01-01 PROCEDURE — 94660 CPAP INITIATION&MGMT: CPT

## 2025-01-01 PROCEDURE — 82550 ASSAY OF CK (CPK): CPT | Performed by: NURSE PRACTITIONER

## 2025-01-01 PROCEDURE — 25810000003 SODIUM CHLORIDE 0.9 % SOLUTION: Performed by: NURSE PRACTITIONER

## 2025-01-01 PROCEDURE — 36600 WITHDRAWAL OF ARTERIAL BLOOD: CPT

## 2025-01-01 PROCEDURE — 84132 ASSAY OF SERUM POTASSIUM: CPT | Performed by: HOSPITALIST

## 2025-01-01 PROCEDURE — 80053 COMPREHEN METABOLIC PANEL: CPT | Performed by: NURSE PRACTITIONER

## 2025-01-01 PROCEDURE — 82746 ASSAY OF FOLIC ACID SERUM: CPT | Performed by: NURSE PRACTITIONER

## 2025-01-01 PROCEDURE — 86900 BLOOD TYPING SEROLOGIC ABO: CPT | Performed by: NURSE PRACTITIONER

## 2025-01-01 PROCEDURE — 80053 COMPREHEN METABOLIC PANEL: CPT | Performed by: FAMILY MEDICINE

## 2025-01-01 PROCEDURE — 85027 COMPLETE CBC AUTOMATED: CPT | Performed by: HOSPITALIST

## 2025-01-01 PROCEDURE — 84484 ASSAY OF TROPONIN QUANT: CPT | Performed by: NURSE PRACTITIONER

## 2025-01-01 PROCEDURE — 74176 CT ABD & PELVIS W/O CONTRAST: CPT

## 2025-01-01 PROCEDURE — 25010000002 MIDAZOLAM PER 1 MG: Performed by: NURSE PRACTITIONER

## 2025-01-01 PROCEDURE — 94761 N-INVAS EAR/PLS OXIMETRY MLT: CPT

## 2025-01-01 PROCEDURE — 99285 EMERGENCY DEPT VISIT HI MDM: CPT

## 2025-01-01 PROCEDURE — 80053 COMPREHEN METABOLIC PANEL: CPT | Performed by: HOSPITALIST

## 2025-01-01 PROCEDURE — 83540 ASSAY OF IRON: CPT | Performed by: NURSE PRACTITIONER

## 2025-01-01 PROCEDURE — 93010 ELECTROCARDIOGRAM REPORT: CPT | Performed by: INTERNAL MEDICINE

## 2025-01-01 PROCEDURE — 94640 AIRWAY INHALATION TREATMENT: CPT

## 2025-01-01 PROCEDURE — 25010000002 MORPHINE PER 10 MG: Performed by: FAMILY MEDICINE

## 2025-01-01 PROCEDURE — 84466 ASSAY OF TRANSFERRIN: CPT | Performed by: NURSE PRACTITIONER

## 2025-01-01 PROCEDURE — 84295 ASSAY OF SERUM SODIUM: CPT | Performed by: NURSE PRACTITIONER

## 2025-01-01 PROCEDURE — 84443 ASSAY THYROID STIM HORMONE: CPT | Performed by: NURSE PRACTITIONER

## 2025-01-01 RX ORDER — MORPHINE SULFATE 2 MG/ML
2 INJECTION, SOLUTION INTRAMUSCULAR; INTRAVENOUS
Status: DISCONTINUED | OUTPATIENT
Start: 2025-01-01 | End: 2025-01-01

## 2025-01-01 RX ORDER — IPRATROPIUM BROMIDE AND ALBUTEROL SULFATE 2.5; .5 MG/3ML; MG/3ML
3 SOLUTION RESPIRATORY (INHALATION) EVERY 4 HOURS PRN
Status: CANCELLED | OUTPATIENT
Start: 2025-01-01

## 2025-01-01 RX ORDER — IBUPROFEN 600 MG/1
1 TABLET ORAL
Status: DISCONTINUED | OUTPATIENT
Start: 2025-01-01 | End: 2025-01-01 | Stop reason: HOSPADM

## 2025-01-01 RX ORDER — DIAZEPAM 10 MG/2ML
5 INJECTION, SOLUTION INTRAMUSCULAR; INTRAVENOUS EVERY 6 HOURS PRN
Status: DISCONTINUED | OUTPATIENT
Start: 2025-01-01 | End: 2025-01-01

## 2025-01-01 RX ORDER — SODIUM CHLORIDE 0.9 % (FLUSH) 0.9 %
10 SYRINGE (ML) INJECTION EVERY 12 HOURS SCHEDULED
Status: DISCONTINUED | OUTPATIENT
Start: 2025-01-01 | End: 2025-01-01 | Stop reason: HOSPADM

## 2025-01-01 RX ORDER — ACETAMINOPHEN 160 MG/5ML
650 SOLUTION ORAL EVERY 4 HOURS PRN
Status: DISCONTINUED | OUTPATIENT
Start: 2025-01-01 | End: 2025-08-02 | Stop reason: HOSPADM

## 2025-01-01 RX ORDER — SODIUM CHLORIDE 0.9 % (FLUSH) 0.9 %
10 SYRINGE (ML) INJECTION AS NEEDED
Status: DISCONTINUED | OUTPATIENT
Start: 2025-01-01 | End: 2025-01-01

## 2025-01-01 RX ORDER — MIDAZOLAM HYDROCHLORIDE 1 MG/ML
1 INJECTION, SOLUTION INTRAMUSCULAR; INTRAVENOUS EVERY 4 HOURS
Status: DISCONTINUED | OUTPATIENT
Start: 2025-01-01 | End: 2025-08-02 | Stop reason: HOSPADM

## 2025-01-01 RX ORDER — NITROGLYCERIN 0.4 MG/1
0.4 TABLET SUBLINGUAL
Status: DISCONTINUED | OUTPATIENT
Start: 2025-01-01 | End: 2025-01-01

## 2025-01-01 RX ORDER — ACETAMINOPHEN 325 MG/1
650 TABLET ORAL EVERY 4 HOURS PRN
Status: DISCONTINUED | OUTPATIENT
Start: 2025-01-01 | End: 2025-08-02 | Stop reason: HOSPADM

## 2025-01-01 RX ORDER — POTASSIUM CHLORIDE 7.45 MG/ML
10 INJECTION INTRAVENOUS
Status: COMPLETED | OUTPATIENT
Start: 2025-01-01 | End: 2025-01-01

## 2025-01-01 RX ORDER — DIAZEPAM 10 MG/2ML
5 INJECTION, SOLUTION INTRAMUSCULAR; INTRAVENOUS EVERY 6 HOURS PRN
Status: CANCELLED | OUTPATIENT
Start: 2025-01-01 | End: 2025-01-01

## 2025-01-01 RX ORDER — POLYETHYLENE GLYCOL 3350 17 G/17G
17 POWDER, FOR SOLUTION ORAL DAILY PRN
Status: DISCONTINUED | OUTPATIENT
Start: 2025-01-01 | End: 2025-01-01 | Stop reason: HOSPADM

## 2025-01-01 RX ORDER — SCOPOLAMINE 1 MG/3D
1 PATCH, EXTENDED RELEASE TRANSDERMAL
Status: DISCONTINUED | OUTPATIENT
Start: 2025-01-01 | End: 2025-08-02 | Stop reason: HOSPADM

## 2025-01-01 RX ORDER — SODIUM CHLORIDE 9 MG/ML
40 INJECTION, SOLUTION INTRAVENOUS AS NEEDED
Status: DISCONTINUED | OUTPATIENT
Start: 2025-01-01 | End: 2025-01-01 | Stop reason: HOSPADM

## 2025-01-01 RX ORDER — MIRTAZAPINE 15 MG/1
15 TABLET, FILM COATED ORAL NIGHTLY
Status: DISCONTINUED | OUTPATIENT
Start: 2025-01-01 | End: 2025-01-01 | Stop reason: HOSPADM

## 2025-01-01 RX ORDER — ONDANSETRON 2 MG/ML
4 INJECTION INTRAMUSCULAR; INTRAVENOUS EVERY 6 HOURS PRN
Status: DISCONTINUED | OUTPATIENT
Start: 2025-01-01 | End: 2025-08-02 | Stop reason: HOSPADM

## 2025-01-01 RX ORDER — LEVOTHYROXINE SODIUM 137 UG/1
137 TABLET ORAL NIGHTLY
COMMUNITY

## 2025-01-01 RX ORDER — GLYCOPYRROLATE 0.2 MG/ML
0.4 INJECTION INTRAMUSCULAR; INTRAVENOUS EVERY 4 HOURS PRN
Status: DISCONTINUED | OUTPATIENT
Start: 2025-01-01 | End: 2025-08-02 | Stop reason: HOSPADM

## 2025-01-01 RX ORDER — GLYCOPYRROLATE 0.2 MG/ML
0.4 INJECTION INTRAMUSCULAR; INTRAVENOUS EVERY 4 HOURS
Status: DISCONTINUED | OUTPATIENT
Start: 2025-01-01 | End: 2025-08-02 | Stop reason: HOSPADM

## 2025-01-01 RX ORDER — ACETAMINOPHEN 650 MG/1
650 SUPPOSITORY RECTAL EVERY 4 HOURS PRN
Status: DISCONTINUED | OUTPATIENT
Start: 2025-01-01 | End: 2025-08-02 | Stop reason: HOSPADM

## 2025-01-01 RX ORDER — OLANZAPINE 5 MG/1
5 TABLET, ORALLY DISINTEGRATING ORAL NIGHTLY
Status: CANCELLED | OUTPATIENT
Start: 2025-01-01

## 2025-01-01 RX ORDER — MORPHINE SULFATE 2 MG/ML
2 INJECTION, SOLUTION INTRAMUSCULAR; INTRAVENOUS
Status: DISCONTINUED | OUTPATIENT
Start: 2025-01-01 | End: 2025-08-02 | Stop reason: HOSPADM

## 2025-01-01 RX ORDER — MORPHINE SULFATE 2 MG/ML
2 INJECTION, SOLUTION INTRAMUSCULAR; INTRAVENOUS
Status: DISCONTINUED | OUTPATIENT
Start: 2025-01-01 | End: 2025-01-01 | Stop reason: HOSPADM

## 2025-01-01 RX ORDER — SODIUM CHLORIDE 0.9 % (FLUSH) 0.9 %
10 SYRINGE (ML) INJECTION EVERY 12 HOURS SCHEDULED
Status: DISCONTINUED | OUTPATIENT
Start: 2025-01-01 | End: 2025-08-02 | Stop reason: HOSPADM

## 2025-01-01 RX ORDER — MORPHINE SULFATE 2 MG/ML
2 INJECTION, SOLUTION INTRAMUSCULAR; INTRAVENOUS
Status: CANCELLED | OUTPATIENT
Start: 2025-01-01 | End: 2025-08-02

## 2025-01-01 RX ORDER — MIDAZOLAM HYDROCHLORIDE 1 MG/ML
0.5 INJECTION, SOLUTION INTRAMUSCULAR; INTRAVENOUS
Status: DISCONTINUED | OUTPATIENT
Start: 2025-01-01 | End: 2025-01-01

## 2025-01-01 RX ORDER — MIDAZOLAM HYDROCHLORIDE 1 MG/ML
1 INJECTION, SOLUTION INTRAMUSCULAR; INTRAVENOUS
Status: DISCONTINUED | OUTPATIENT
Start: 2025-01-01 | End: 2025-08-02 | Stop reason: HOSPADM

## 2025-01-01 RX ORDER — MORPHINE SULFATE 2 MG/ML
2 INJECTION, SOLUTION INTRAMUSCULAR; INTRAVENOUS EVERY 4 HOURS
Status: DISCONTINUED | OUTPATIENT
Start: 2025-01-01 | End: 2025-08-02 | Stop reason: HOSPADM

## 2025-01-01 RX ORDER — MIRTAZAPINE 15 MG/1
15 TABLET, FILM COATED ORAL NIGHTLY
Status: CANCELLED | OUTPATIENT
Start: 2025-01-01

## 2025-01-01 RX ORDER — ACETAMINOPHEN 160 MG/5ML
650 SOLUTION ORAL EVERY 4 HOURS PRN
Status: DISCONTINUED | OUTPATIENT
Start: 2025-01-01 | End: 2025-01-01 | Stop reason: HOSPADM

## 2025-01-01 RX ORDER — HYOSCYAMINE SULFATE 0.12 MG/1
0.12 TABLET SUBLINGUAL ONCE AS NEEDED
COMMUNITY

## 2025-01-01 RX ORDER — AMOXICILLIN 250 MG
2 CAPSULE ORAL 2 TIMES DAILY
Status: CANCELLED | OUTPATIENT
Start: 2025-01-01

## 2025-01-01 RX ORDER — LEVOTHYROXINE SODIUM 125 UG/1
125 TABLET ORAL DAILY
COMMUNITY

## 2025-01-01 RX ORDER — ACETAMINOPHEN 160 MG/5ML
650 SOLUTION ORAL EVERY 4 HOURS PRN
Status: CANCELLED | OUTPATIENT
Start: 2025-01-01

## 2025-01-01 RX ORDER — BISACODYL 10 MG
10 SUPPOSITORY, RECTAL RECTAL DAILY PRN
Status: DISCONTINUED | OUTPATIENT
Start: 2025-01-01 | End: 2025-08-02 | Stop reason: HOSPADM

## 2025-01-01 RX ORDER — FUROSEMIDE 10 MG/ML
20 INJECTION INTRAMUSCULAR; INTRAVENOUS EVERY 6 HOURS PRN
Status: DISCONTINUED | OUTPATIENT
Start: 2025-01-01 | End: 2025-08-02 | Stop reason: HOSPADM

## 2025-01-01 RX ORDER — DIAZEPAM 10 MG/2ML
5 INJECTION, SOLUTION INTRAMUSCULAR; INTRAVENOUS EVERY 4 HOURS PRN
Status: DISPENSED | OUTPATIENT
Start: 2025-01-01 | End: 2025-01-01

## 2025-01-01 RX ORDER — SODIUM CHLORIDE 0.9 % (FLUSH) 0.9 %
10 SYRINGE (ML) INJECTION AS NEEDED
Status: DISCONTINUED | OUTPATIENT
Start: 2025-01-01 | End: 2025-08-02 | Stop reason: HOSPADM

## 2025-01-01 RX ORDER — LEVOTHYROXINE SODIUM 125 UG/1
125 TABLET ORAL
Status: DISCONTINUED | OUTPATIENT
Start: 2025-01-01 | End: 2025-01-01 | Stop reason: HOSPADM

## 2025-01-01 RX ORDER — SODIUM CHLORIDE 9 MG/ML
40 INJECTION, SOLUTION INTRAVENOUS AS NEEDED
Status: CANCELLED | OUTPATIENT
Start: 2025-01-01

## 2025-01-01 RX ORDER — SODIUM CHLORIDE 0.9 % (FLUSH) 0.9 %
10 SYRINGE (ML) INJECTION EVERY 12 HOURS SCHEDULED
Status: CANCELLED | OUTPATIENT
Start: 2025-01-01

## 2025-01-01 RX ORDER — MEMANTINE HYDROCHLORIDE 10 MG/1
10 TABLET ORAL EVERY 12 HOURS SCHEDULED
Status: DISCONTINUED | OUTPATIENT
Start: 2025-01-01 | End: 2025-01-01 | Stop reason: HOSPADM

## 2025-01-01 RX ORDER — GLYCOPYRROLATE 0.2 MG/ML
0.4 INJECTION INTRAMUSCULAR; INTRAVENOUS EVERY 6 HOURS
Status: DISCONTINUED | OUTPATIENT
Start: 2025-01-01 | End: 2025-01-01

## 2025-01-01 RX ORDER — DEXTROSE MONOHYDRATE 25 G/50ML
25 INJECTION, SOLUTION INTRAVENOUS
Status: DISCONTINUED | OUTPATIENT
Start: 2025-01-01 | End: 2025-01-01

## 2025-01-01 RX ORDER — MORPHINE SULFATE 2 MG/ML
2 INJECTION, SOLUTION INTRAMUSCULAR; INTRAVENOUS EVERY 6 HOURS
Status: DISCONTINUED | OUTPATIENT
Start: 2025-01-01 | End: 2025-01-01

## 2025-01-01 RX ORDER — NICOTINE POLACRILEX 4 MG
15 LOZENGE BUCCAL
Status: DISCONTINUED | OUTPATIENT
Start: 2025-01-01 | End: 2025-01-01

## 2025-01-01 RX ORDER — CHOLECALCIFEROL (VITAMIN D3) 50 MCG
50 TABLET ORAL DAILY
COMMUNITY

## 2025-01-01 RX ORDER — SODIUM CHLORIDE 0.9 % (FLUSH) 0.9 %
10 SYRINGE (ML) INJECTION AS NEEDED
Status: CANCELLED | OUTPATIENT
Start: 2025-01-01

## 2025-01-01 RX ORDER — IPRATROPIUM BROMIDE AND ALBUTEROL SULFATE 2.5; .5 MG/3ML; MG/3ML
3 SOLUTION RESPIRATORY (INHALATION) ONCE
Status: COMPLETED | OUTPATIENT
Start: 2025-01-01 | End: 2025-01-01

## 2025-01-01 RX ORDER — ACETAMINOPHEN 325 MG/1
650 TABLET ORAL EVERY 4 HOURS PRN
Status: CANCELLED | OUTPATIENT
Start: 2025-01-01

## 2025-01-01 RX ORDER — DEXTROSE MONOHYDRATE AND SODIUM CHLORIDE 5; .45 G/100ML; G/100ML
75 INJECTION, SOLUTION INTRAVENOUS CONTINUOUS
Status: ACTIVE | OUTPATIENT
Start: 2025-01-01 | End: 2025-01-01

## 2025-01-01 RX ORDER — POLYETHYLENE GLYCOL 3350 17 G/17G
17 POWDER, FOR SOLUTION ORAL DAILY PRN
Status: CANCELLED | OUTPATIENT
Start: 2025-01-01

## 2025-01-01 RX ORDER — ACETAMINOPHEN 650 MG/1
650 SUPPOSITORY RECTAL EVERY 4 HOURS PRN
Status: DISCONTINUED | OUTPATIENT
Start: 2025-01-01 | End: 2025-01-01 | Stop reason: HOSPADM

## 2025-01-01 RX ORDER — ACETAMINOPHEN 650 MG/1
650 SUPPOSITORY RECTAL EVERY 4 HOURS PRN
Status: CANCELLED | OUTPATIENT
Start: 2025-01-01

## 2025-01-01 RX ORDER — BISACODYL 5 MG/1
5 TABLET, DELAYED RELEASE ORAL DAILY PRN
Status: DISCONTINUED | OUTPATIENT
Start: 2025-01-01 | End: 2025-01-01 | Stop reason: HOSPADM

## 2025-01-01 RX ORDER — BISACODYL 10 MG
10 SUPPOSITORY, RECTAL RECTAL DAILY PRN
Status: CANCELLED | OUTPATIENT
Start: 2025-01-01

## 2025-01-01 RX ORDER — DIAZEPAM 10 MG/2ML
5 INJECTION, SOLUTION INTRAMUSCULAR; INTRAVENOUS EVERY 6 HOURS PRN
Status: DISCONTINUED | OUTPATIENT
Start: 2025-01-01 | End: 2025-01-01 | Stop reason: HOSPADM

## 2025-01-01 RX ORDER — AMOXICILLIN 250 MG
2 CAPSULE ORAL 2 TIMES DAILY
Status: DISCONTINUED | OUTPATIENT
Start: 2025-01-01 | End: 2025-01-01 | Stop reason: HOSPADM

## 2025-01-01 RX ORDER — DEXTROSE MONOHYDRATE 50 MG/ML
50 INJECTION, SOLUTION INTRAVENOUS CONTINUOUS
Status: ACTIVE | OUTPATIENT
Start: 2025-01-01 | End: 2025-01-01

## 2025-01-01 RX ORDER — ACETAMINOPHEN 325 MG/1
650 TABLET ORAL EVERY 4 HOURS PRN
Status: DISCONTINUED | OUTPATIENT
Start: 2025-01-01 | End: 2025-01-01 | Stop reason: HOSPADM

## 2025-01-01 RX ORDER — SODIUM CHLORIDE 0.9 % (FLUSH) 0.9 %
10 SYRINGE (ML) INJECTION AS NEEDED
Status: DISCONTINUED | OUTPATIENT
Start: 2025-01-01 | End: 2025-01-01 | Stop reason: HOSPADM

## 2025-01-01 RX ORDER — LEVOTHYROXINE SODIUM 125 UG/1
125 TABLET ORAL
Status: CANCELLED | OUTPATIENT
Start: 2025-01-01

## 2025-01-01 RX ORDER — IBUPROFEN 600 MG/1
1 TABLET ORAL
Status: CANCELLED | OUTPATIENT
Start: 2025-01-01

## 2025-01-01 RX ORDER — IPRATROPIUM BROMIDE AND ALBUTEROL SULFATE 2.5; .5 MG/3ML; MG/3ML
3 SOLUTION RESPIRATORY (INHALATION) EVERY 4 HOURS PRN
Status: DISCONTINUED | OUTPATIENT
Start: 2025-01-01 | End: 2025-01-01 | Stop reason: HOSPADM

## 2025-01-01 RX ORDER — BISACODYL 5 MG/1
5 TABLET, DELAYED RELEASE ORAL DAILY PRN
Status: CANCELLED | OUTPATIENT
Start: 2025-01-01

## 2025-01-01 RX ORDER — MEMANTINE HYDROCHLORIDE 10 MG/1
10 TABLET ORAL EVERY 12 HOURS SCHEDULED
Status: CANCELLED | OUTPATIENT
Start: 2025-01-01

## 2025-01-01 RX ORDER — BISACODYL 10 MG
10 SUPPOSITORY, RECTAL RECTAL DAILY PRN
Status: DISCONTINUED | OUTPATIENT
Start: 2025-01-01 | End: 2025-01-01 | Stop reason: HOSPADM

## 2025-01-01 RX ORDER — OLANZAPINE 5 MG/1
5 TABLET, ORALLY DISINTEGRATING ORAL NIGHTLY
Status: DISCONTINUED | OUTPATIENT
Start: 2025-01-01 | End: 2025-01-01 | Stop reason: HOSPADM

## 2025-01-01 RX ADMIN — MORPHINE SULFATE 2 MG: 2 INJECTION, SOLUTION INTRAMUSCULAR; INTRAVENOUS at 13:07

## 2025-01-01 RX ADMIN — ACETAMINOPHEN 650 MG: 325 TABLET, FILM COATED ORAL at 08:31

## 2025-01-01 RX ADMIN — GLYCOPYRROLATE 0.4 MG: 0.2 INJECTION, SOLUTION INTRAMUSCULAR; INTRAVENOUS at 18:21

## 2025-01-01 RX ADMIN — MORPHINE SULFATE 2 MG: 2 INJECTION, SOLUTION INTRAMUSCULAR; INTRAVENOUS at 11:30

## 2025-01-01 RX ADMIN — POTASSIUM CHLORIDE 10 MEQ: 7.46 INJECTION, SOLUTION INTRAVENOUS at 07:48

## 2025-01-01 RX ADMIN — MORPHINE SULFATE 2 MG: 2 INJECTION, SOLUTION INTRAMUSCULAR; INTRAVENOUS at 16:21

## 2025-01-01 RX ADMIN — MINERAL OIL: 999 LIQUID ORAL at 21:49

## 2025-01-01 RX ADMIN — MEMANTINE HYDROCHLORIDE 10 MG: 10 TABLET, FILM COATED ORAL at 21:29

## 2025-01-01 RX ADMIN — PIPERACILLIN AND TAZOBACTAM 3.38 G: 3; .375 INJECTION, POWDER, FOR SOLUTION INTRAVENOUS at 00:12

## 2025-01-01 RX ADMIN — FUROSEMIDE 20 MG: 10 INJECTION, SOLUTION INTRAVENOUS at 12:54

## 2025-01-01 RX ADMIN — MIDAZOLAM HYDROCHLORIDE 1 MG: 1 INJECTION, SOLUTION INTRAMUSCULAR; INTRAVENOUS at 16:35

## 2025-01-01 RX ADMIN — MEMANTINE HYDROCHLORIDE 10 MG: 10 TABLET, FILM COATED ORAL at 20:32

## 2025-01-01 RX ADMIN — MIRTAZAPINE 15 MG: 15 TABLET, FILM COATED ORAL at 21:09

## 2025-01-01 RX ADMIN — SENNOSIDES AND DOCUSATE SODIUM 2 TABLET: 50; 8.6 TABLET ORAL at 21:10

## 2025-01-01 RX ADMIN — PIPERACILLIN AND TAZOBACTAM 3.38 G: 3; .375 INJECTION, POWDER, FOR SOLUTION INTRAVENOUS at 00:31

## 2025-01-01 RX ADMIN — PIPERACILLIN AND TAZOBACTAM 3.38 G: 3; .375 INJECTION, POWDER, FOR SOLUTION INTRAVENOUS at 15:30

## 2025-01-01 RX ADMIN — DEXTROSE MONOHYDRATE 50 ML/HR: 50 INJECTION, SOLUTION INTRAVENOUS at 15:30

## 2025-01-01 RX ADMIN — LEVOTHYROXINE SODIUM 125 MCG: 125 TABLET ORAL at 05:15

## 2025-01-01 RX ADMIN — OLANZAPINE 5 MG: 5 TABLET, ORALLY DISINTEGRATING ORAL at 21:09

## 2025-01-01 RX ADMIN — ACETAMINOPHEN 650 MG: 325 TABLET, FILM COATED ORAL at 05:22

## 2025-01-01 RX ADMIN — MORPHINE SULFATE 2 MG: 2 INJECTION, SOLUTION INTRAMUSCULAR; INTRAVENOUS at 05:45

## 2025-01-01 RX ADMIN — IPRATROPIUM BROMIDE AND ALBUTEROL SULFATE 3 ML: .5; 3 SOLUTION RESPIRATORY (INHALATION) at 00:52

## 2025-01-01 RX ADMIN — MEMANTINE HYDROCHLORIDE 10 MG: 10 TABLET, FILM COATED ORAL at 10:26

## 2025-01-01 RX ADMIN — Medication 10 ML: at 21:10

## 2025-01-01 RX ADMIN — Medication 10 ML: at 09:19

## 2025-01-01 RX ADMIN — MORPHINE SULFATE 2 MG: 2 INJECTION, SOLUTION INTRAMUSCULAR; INTRAVENOUS at 17:03

## 2025-01-01 RX ADMIN — MORPHINE SULFATE 2 MG: 2 INJECTION, SOLUTION INTRAMUSCULAR; INTRAVENOUS at 10:38

## 2025-01-01 RX ADMIN — Medication 10 ML: at 21:29

## 2025-01-01 RX ADMIN — LEVOTHYROXINE SODIUM 125 MCG: 125 TABLET ORAL at 05:04

## 2025-01-01 RX ADMIN — MEMANTINE HYDROCHLORIDE 10 MG: 10 TABLET, FILM COATED ORAL at 09:19

## 2025-01-01 RX ADMIN — MORPHINE SULFATE 2 MG: 2 INJECTION, SOLUTION INTRAMUSCULAR; INTRAVENOUS at 18:21

## 2025-01-01 RX ADMIN — GLYCOPYRROLATE 0.4 MG: 0.2 INJECTION, SOLUTION INTRAMUSCULAR; INTRAVENOUS at 04:13

## 2025-01-01 RX ADMIN — POTASSIUM CHLORIDE 10 MEQ: 7.46 INJECTION, SOLUTION INTRAVENOUS at 02:56

## 2025-01-01 RX ADMIN — MORPHINE SULFATE 2 MG: 2 INJECTION, SOLUTION INTRAMUSCULAR; INTRAVENOUS at 23:49

## 2025-01-01 RX ADMIN — PIPERACILLIN AND TAZOBACTAM 3.38 G: 3; .375 INJECTION, POWDER, FOR SOLUTION INTRAVENOUS at 08:32

## 2025-01-01 RX ADMIN — GLYCOPYRROLATE 0.4 MG: 0.2 INJECTION, SOLUTION INTRAMUSCULAR; INTRAVENOUS at 05:41

## 2025-01-01 RX ADMIN — DEXTROSE MONOHYDRATE AND SODIUM CHLORIDE 50 ML/HR: 5; .45 INJECTION, SOLUTION INTRAVENOUS at 23:41

## 2025-01-01 RX ADMIN — Medication 10 ML: at 21:28

## 2025-01-01 RX ADMIN — Medication 10 ML: at 08:24

## 2025-01-01 RX ADMIN — Medication 10 ML: at 20:24

## 2025-01-01 RX ADMIN — GLYCOPYRROLATE 0.4 MG: 0.2 INJECTION, SOLUTION INTRAMUSCULAR; INTRAVENOUS at 04:09

## 2025-01-01 RX ADMIN — OLANZAPINE 5 MG: 5 TABLET, ORALLY DISINTEGRATING ORAL at 20:32

## 2025-01-01 RX ADMIN — MINERAL OIL: 999 LIQUID ORAL at 16:36

## 2025-01-01 RX ADMIN — MORPHINE SULFATE 2 MG: 2 INJECTION, SOLUTION INTRAMUSCULAR; INTRAVENOUS at 16:35

## 2025-01-01 RX ADMIN — MORPHINE SULFATE 2 MG: 2 INJECTION, SOLUTION INTRAMUSCULAR; INTRAVENOUS at 08:32

## 2025-01-01 RX ADMIN — MORPHINE SULFATE 2 MG: 2 INJECTION, SOLUTION INTRAMUSCULAR; INTRAVENOUS at 14:54

## 2025-01-01 RX ADMIN — GLYCOPYRROLATE 0.4 MG: 0.2 INJECTION, SOLUTION INTRAMUSCULAR; INTRAVENOUS at 11:29

## 2025-01-01 RX ADMIN — OLANZAPINE 5 MG: 5 TABLET, ORALLY DISINTEGRATING ORAL at 21:23

## 2025-01-01 RX ADMIN — DEXTROSE MONOHYDRATE AND SODIUM CHLORIDE 75 ML/HR: 5; .45 INJECTION, SOLUTION INTRAVENOUS at 20:17

## 2025-01-01 RX ADMIN — MORPHINE SULFATE 2 MG: 2 INJECTION, SOLUTION INTRAMUSCULAR; INTRAVENOUS at 12:27

## 2025-01-01 RX ADMIN — GLYCOPYRROLATE 0.4 MG: 0.2 INJECTION, SOLUTION INTRAMUSCULAR; INTRAVENOUS at 12:26

## 2025-01-01 RX ADMIN — MIDAZOLAM HYDROCHLORIDE 0.5 MG: 1 INJECTION, SOLUTION INTRAMUSCULAR; INTRAVENOUS at 16:35

## 2025-01-01 RX ADMIN — Medication 10 ML: at 20:25

## 2025-01-01 RX ADMIN — MIDAZOLAM HYDROCHLORIDE 0.5 MG: 1 INJECTION, SOLUTION INTRAMUSCULAR; INTRAVENOUS at 10:27

## 2025-01-01 RX ADMIN — MORPHINE SULFATE 2 MG: 2 INJECTION, SOLUTION INTRAMUSCULAR; INTRAVENOUS at 12:01

## 2025-01-01 RX ADMIN — MIDAZOLAM HYDROCHLORIDE 0.5 MG: 1 INJECTION, SOLUTION INTRAMUSCULAR; INTRAVENOUS at 12:54

## 2025-01-01 RX ADMIN — ACETAMINOPHEN 650 MG: 325 TABLET, FILM COATED ORAL at 20:55

## 2025-01-01 RX ADMIN — GLYCOPYRROLATE 0.4 MG: 0.2 INJECTION, SOLUTION INTRAMUSCULAR; INTRAVENOUS at 05:46

## 2025-01-01 RX ADMIN — MINERAL OIL: 999 LIQUID ORAL at 05:30

## 2025-01-01 RX ADMIN — SENNOSIDES AND DOCUSATE SODIUM 2 TABLET: 50; 8.6 TABLET ORAL at 08:32

## 2025-01-01 RX ADMIN — POTASSIUM CHLORIDE 10 MEQ: 7.46 INJECTION, SOLUTION INTRAVENOUS at 05:43

## 2025-01-01 RX ADMIN — GLYCOPYRROLATE 0.4 MG: 0.2 INJECTION INTRAMUSCULAR; INTRAVENOUS at 16:35

## 2025-01-01 RX ADMIN — GLYCOPYRROLATE 0.4 MG: 0.2 INJECTION, SOLUTION INTRAMUSCULAR; INTRAVENOUS at 23:08

## 2025-01-01 RX ADMIN — MIDAZOLAM HYDROCHLORIDE 0.5 MG: 1 INJECTION, SOLUTION INTRAMUSCULAR; INTRAVENOUS at 18:21

## 2025-01-01 RX ADMIN — SENNOSIDES AND DOCUSATE SODIUM 2 TABLET: 50; 8.6 TABLET ORAL at 21:22

## 2025-01-01 RX ADMIN — ACETAMINOPHEN 650 MG: 650 SUPPOSITORY RECTAL at 20:23

## 2025-01-01 RX ADMIN — DEXTROSE MONOHYDRATE 25 G: 25 INJECTION, SOLUTION INTRAVENOUS at 11:57

## 2025-01-01 RX ADMIN — MINERAL OIL: 999 LIQUID ORAL at 10:38

## 2025-01-01 RX ADMIN — PIPERACILLIN AND TAZOBACTAM 3.38 G: 3; .375 INJECTION, POWDER, FOR SOLUTION INTRAVENOUS at 01:39

## 2025-01-01 RX ADMIN — SENNOSIDES AND DOCUSATE SODIUM 2 TABLET: 50; 8.6 TABLET ORAL at 21:29

## 2025-01-01 RX ADMIN — MORPHINE SULFATE 2 MG: 2 INJECTION, SOLUTION INTRAMUSCULAR; INTRAVENOUS at 04:07

## 2025-01-01 RX ADMIN — SENNOSIDES AND DOCUSATE SODIUM 2 TABLET: 50; 8.6 TABLET ORAL at 10:26

## 2025-01-01 RX ADMIN — Medication 10 ML: at 10:21

## 2025-01-01 RX ADMIN — PIPERACILLIN AND TAZOBACTAM 3.38 G: 3; .375 INJECTION, POWDER, FOR SOLUTION INTRAVENOUS at 01:35

## 2025-01-01 RX ADMIN — MIDAZOLAM HYDROCHLORIDE 0.5 MG: 1 INJECTION, SOLUTION INTRAMUSCULAR; INTRAVENOUS at 23:49

## 2025-01-01 RX ADMIN — GLYCOPYRROLATE 0.4 MG: 0.2 INJECTION, SOLUTION INTRAMUSCULAR; INTRAVENOUS at 00:18

## 2025-01-01 RX ADMIN — MORPHINE SULFATE 2 MG: 2 INJECTION, SOLUTION INTRAMUSCULAR; INTRAVENOUS at 21:22

## 2025-01-01 RX ADMIN — GLYCOPYRROLATE 0.4 MG: 0.2 INJECTION INTRAMUSCULAR; INTRAVENOUS at 12:00

## 2025-01-01 RX ADMIN — MIDAZOLAM HYDROCHLORIDE 1 MG: 1 INJECTION, SOLUTION INTRAMUSCULAR; INTRAVENOUS at 12:01

## 2025-01-01 RX ADMIN — GLYCOPYRROLATE 0.4 MG: 0.2 INJECTION, SOLUTION INTRAMUSCULAR; INTRAVENOUS at 16:36

## 2025-01-01 RX ADMIN — Medication 10 ML: at 10:22

## 2025-01-01 RX ADMIN — MINERAL OIL: 999 LIQUID ORAL at 16:35

## 2025-01-01 RX ADMIN — PIPERACILLIN AND TAZOBACTAM 3.38 G: 3; .375 INJECTION, POWDER, FOR SOLUTION INTRAVENOUS at 17:07

## 2025-01-01 RX ADMIN — Medication 10 ML: at 20:56

## 2025-01-01 RX ADMIN — SODIUM CHLORIDE 1000 ML: 9 INJECTION, SOLUTION INTRAVENOUS at 15:58

## 2025-01-01 RX ADMIN — DEXTROSE MONOHYDRATE 50 ML/HR: 50 INJECTION, SOLUTION INTRAVENOUS at 18:09

## 2025-01-01 RX ADMIN — PIPERACILLIN AND TAZOBACTAM 3.38 G: 3; .375 INJECTION, POWDER, FOR SOLUTION INTRAVENOUS at 08:14

## 2025-01-01 RX ADMIN — MINERAL OIL: 999 LIQUID ORAL at 04:08

## 2025-01-01 RX ADMIN — ACETAMINOPHEN 650 MG: 325 TABLET, FILM COATED ORAL at 19:40

## 2025-01-01 RX ADMIN — MINERAL OIL: 999 LIQUID ORAL at 17:34

## 2025-01-01 RX ADMIN — MORPHINE SULFATE 2 MG: 2 INJECTION, SOLUTION INTRAMUSCULAR; INTRAVENOUS at 10:27

## 2025-01-01 RX ADMIN — MORPHINE SULFATE 2 MG: 2 INJECTION, SOLUTION INTRAMUSCULAR; INTRAVENOUS at 00:18

## 2025-01-01 RX ADMIN — MORPHINE SULFATE 2 MG: 2 INJECTION, SOLUTION INTRAMUSCULAR; INTRAVENOUS at 05:30

## 2025-01-01 RX ADMIN — FUROSEMIDE 20 MG: 10 INJECTION, SOLUTION INTRAVENOUS at 20:24

## 2025-01-01 RX ADMIN — MEMANTINE HYDROCHLORIDE 10 MG: 10 TABLET, FILM COATED ORAL at 21:23

## 2025-01-01 RX ADMIN — MIDAZOLAM HYDROCHLORIDE 0.5 MG: 1 INJECTION, SOLUTION INTRAMUSCULAR; INTRAVENOUS at 08:31

## 2025-01-01 RX ADMIN — MIRTAZAPINE 15 MG: 15 TABLET, FILM COATED ORAL at 21:23

## 2025-01-01 RX ADMIN — POTASSIUM CHLORIDE 10 MEQ: 7.46 INJECTION, SOLUTION INTRAVENOUS at 04:21

## 2025-01-01 RX ADMIN — MORPHINE SULFATE 2 MG: 2 INJECTION, SOLUTION INTRAMUSCULAR; INTRAVENOUS at 23:08

## 2025-01-01 RX ADMIN — MORPHINE SULFATE 2 MG: 2 INJECTION, SOLUTION INTRAMUSCULAR; INTRAVENOUS at 17:34

## 2025-01-01 RX ADMIN — Medication 10 ML: at 09:07

## 2025-01-01 RX ADMIN — SCOPOLAMINE 1 PATCH: 1.5 PATCH, EXTENDED RELEASE TRANSDERMAL at 02:04

## 2025-01-01 RX ADMIN — Medication 10 ML: at 09:00

## 2025-01-01 RX ADMIN — MIRTAZAPINE 15 MG: 15 TABLET, FILM COATED ORAL at 21:29

## 2025-01-01 RX ADMIN — MINERAL OIL: 999 LIQUID ORAL at 10:22

## 2025-01-01 RX ADMIN — MORPHINE SULFATE 2 MG: 2 INJECTION, SOLUTION INTRAMUSCULAR; INTRAVENOUS at 13:35

## 2025-01-01 RX ADMIN — POTASSIUM CHLORIDE 10 MEQ: 7.46 INJECTION, SOLUTION INTRAVENOUS at 06:15

## 2025-01-01 RX ADMIN — Medication 10 ML: at 21:23

## 2025-01-01 RX ADMIN — SENNOSIDES AND DOCUSATE SODIUM 2 TABLET: 50; 8.6 TABLET ORAL at 09:19

## 2025-01-01 RX ADMIN — Medication 10 ML: at 08:32

## 2025-01-01 RX ADMIN — PIPERACILLIN AND TAZOBACTAM 3.38 G: 3; .375 INJECTION, POWDER, FOR SOLUTION INTRAVENOUS at 08:43

## 2025-01-01 RX ADMIN — MEMANTINE HYDROCHLORIDE 10 MG: 10 TABLET, FILM COATED ORAL at 08:31

## 2025-01-01 RX ADMIN — MINERAL OIL: 999 LIQUID ORAL at 12:01

## 2025-01-01 RX ADMIN — PIPERACILLIN AND TAZOBACTAM 3.38 G: 3; .375 INJECTION, POWDER, FOR SOLUTION INTRAVENOUS at 16:16

## 2025-01-01 RX ADMIN — PIPERACILLIN AND TAZOBACTAM 3.38 G: 3; .375 INJECTION, POWDER, FOR SOLUTION INTRAVENOUS at 08:23

## 2025-01-01 RX ADMIN — MINERAL OIL: 999 LIQUID ORAL at 05:18

## 2025-01-01 RX ADMIN — GLYCOPYRROLATE 0.4 MG: 0.2 INJECTION, SOLUTION INTRAMUSCULAR; INTRAVENOUS at 16:21

## 2025-01-01 RX ADMIN — MORPHINE SULFATE 2 MG: 2 INJECTION, SOLUTION INTRAMUSCULAR; INTRAVENOUS at 12:55

## 2025-01-01 RX ADMIN — DEXTROSE MONOHYDRATE 12.5 G: 25 INJECTION, SOLUTION INTRAVENOUS at 23:06

## 2025-01-01 RX ADMIN — LEVOTHYROXINE SODIUM 125 MCG: 125 TABLET ORAL at 05:22

## 2025-01-01 RX ADMIN — MEMANTINE HYDROCHLORIDE 10 MG: 10 TABLET, FILM COATED ORAL at 21:09

## 2025-01-01 RX ADMIN — MIRTAZAPINE 15 MG: 15 TABLET, FILM COATED ORAL at 20:32

## 2025-01-01 RX ADMIN — GLYCOPYRROLATE 0.4 MG: 0.2 INJECTION, SOLUTION INTRAMUSCULAR; INTRAVENOUS at 17:34

## 2025-01-01 RX ADMIN — POTASSIUM CHLORIDE 10 MEQ: 7.46 INJECTION, SOLUTION INTRAVENOUS at 01:35

## 2025-01-01 RX ADMIN — MINERAL OIL 1 ENEMA: 100 ENEMA RECTAL at 17:00

## 2025-01-01 RX ADMIN — DEXTROSE MONOHYDRATE 25 G: 25 INJECTION, SOLUTION INTRAVENOUS at 17:30

## 2025-01-01 RX ADMIN — Medication 10 ML: at 10:26

## 2025-01-01 RX ADMIN — MIDAZOLAM HYDROCHLORIDE 0.5 MG: 1 INJECTION, SOLUTION INTRAMUSCULAR; INTRAVENOUS at 10:38

## 2025-01-01 RX ADMIN — OLANZAPINE 5 MG: 5 TABLET, ORALLY DISINTEGRATING ORAL at 21:29

## 2025-07-09 ENCOUNTER — HOSPITAL ENCOUNTER (EMERGENCY)
Facility: HOSPITAL | Age: 75
Discharge: HOME | End: 2025-07-09
Attending: STUDENT IN AN ORGANIZED HEALTH CARE EDUCATION/TRAINING PROGRAM
Payer: MEDICARE

## 2025-07-09 ENCOUNTER — APPOINTMENT (OUTPATIENT)
Facility: HOSPITAL | Age: 75
End: 2025-07-09
Payer: MEDICARE

## 2025-07-09 VITALS
SYSTOLIC BLOOD PRESSURE: 94 MMHG | HEART RATE: 79 BPM | DIASTOLIC BLOOD PRESSURE: 71 MMHG | OXYGEN SATURATION: 96 % | TEMPERATURE: 99.2 F | WEIGHT: 139 LBS | RESPIRATION RATE: 20 BRPM

## 2025-07-09 DIAGNOSIS — S01.01XA LACERATION OF SCALP WITHOUT FOREIGN BODY, INITIAL ENCOUNTER: Primary | ICD-10-CM

## 2025-07-09 PROCEDURE — 70486 CT MAXILLOFACIAL W/O DYE: CPT

## 2025-07-09 PROCEDURE — 70450 CT HEAD/BRAIN W/O DYE: CPT

## 2025-07-09 PROCEDURE — 72125 CT NECK SPINE W/O DYE: CPT

## 2025-07-09 PROCEDURE — 99284 EMERGENCY DEPT VISIT MOD MDM: CPT | Performed by: STUDENT IN AN ORGANIZED HEALTH CARE EDUCATION/TRAINING PROGRAM

## 2025-07-09 RX ORDER — LEVOTHYROXINE SODIUM 125 UG/1
125 TABLET ORAL
Status: ON HOLD | COMMUNITY

## 2025-07-09 RX ORDER — MEMANTINE HYDROCHLORIDE 10 MG/1
10 TABLET ORAL 2 TIMES DAILY
Status: ON HOLD | COMMUNITY

## 2025-07-09 RX ORDER — MIRTAZAPINE 15 MG/1
15 TABLET, ORALLY DISINTEGRATING ORAL NIGHTLY
Status: ON HOLD | COMMUNITY

## 2025-07-09 RX ORDER — OLANZAPINE 5 MG/1
5 TABLET, ORALLY DISINTEGRATING ORAL NIGHTLY
Status: ON HOLD | COMMUNITY

## 2025-07-10 NOTE — FSED PROVIDER NOTE
Subjective   History of Present Illness  Patient is a 74-year-old male who presents emergency department complaining of a fall out of wheelchair.  Patient lives at [facility] in their memory care unit.  Patient was in his wheelchair and fell forward hitting the right side of his head.  Sustaining a laceration.  Patient did not have a loss of consciousness per the staff.  Patient is at current baseline dementia.  Patient has a significant past medical history of dementia.  Patient's wife is informed of all information.  Patient is unable to answer questions appropriately due to his demented state.  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    History provided by:  Spouse and EMS personnel  History limited by:  Dementia      Review of Systems   Skin:         Head laceration         No past medical history on file.    Allergies   Allergen Reactions    Cefdinir Other (See Comments)     Unknown    Sulfamethoxazole-Trimethoprim Other (See Comments)     Unknown       No past surgical history on file.    No family history on file.    Social History     Socioeconomic History    Marital status:            Objective   Physical Exam  Vitals and nursing note reviewed.   Constitutional:       Appearance: He is well-developed.   HENT:      Head: Head contusion: small laceration noted.        Nose: Nose normal.   Eyes:      Pupils: Pupils are equal, round, and reactive to light.   Cardiovascular:      Rate and Rhythm: Normal rate and regular rhythm.   Pulmonary:      Effort: Pulmonary effort is normal.      Breath sounds: Normal breath sounds.   Musculoskeletal:         General: Normal range of motion.      Cervical back: Normal range of motion.   Neurological:      Mental Status: He is alert. Mental status is at baseline.      Comments: At baseline dementia           Laceration Repair    Date/Time: 7/9/2025 9:39 PM    Performed by: Juliana Woods PA-C  Authorized by: Cesar Lundberg MD     Consent:     Consent obtained:  Emergent situation    Alternatives discussed:  No treatment and delayed treatment  Universal protocol:     Imaging studies available: yes    Anesthesia:     Anesthesia method:  None  Laceration details:     Location:  Scalp    Scalp location:  Frontal    Length (cm):  3  Exploration:     Contaminated: no    Treatment:     Area cleansed with:  Chlorhexidine    Amount of cleaning:  Standard  Skin repair:     Repair method:  Staples    Number of staples:  3  Approximation:     Approximation:  Close  Repair type:     Repair type:  Simple  Post-procedure details:     Dressing:  Open (no dressing)    Procedure completion:  Tolerated well, no immediate complications             ED Course                                           Medical Decision Making  74-year-old male who presents emergency department with a laceration to his forehead after fall out of wheelchair.  Differential diagnose includes intracranial injury, skull fracture, facial fracture, cervical spine fracture.  On physical exam patient has a small laceration to his right upper forehead.  No other acute findings noted.  CT of head, face, cervical spine ordered.  CTs returned nonactionable.  [facility] for further evaluation with family and staff concerning his dementia and status in their home.    Amount and/or Complexity of Data Reviewed  Radiology: ordered.        Final diagnoses:   Laceration of scalp without foreign body, initial encounter       ED Disposition  ED Disposition       ED Disposition   Discharge    Condition   Stable    Comment   --               Angel Preston MD  [address]  [facility] [address] [phone]    Schedule an appointment as soon as possible for a visit in 2 days      [facility] EMERGENCY DEPARTMENT [place]  [address]  [facility] [address] [phone]  Go to   As needed, If symptoms  worsen         Medication List      No changes were made to your prescriptions during this visit.

## 2025-07-21 PROBLEM — E83.51 HYPOCALCEMIA: Status: ACTIVE | Noted: 2025-01-01

## 2025-07-21 PROBLEM — E87.0 HYPERNATREMIA: Status: ACTIVE | Noted: 2025-07-21

## 2025-07-21 PROBLEM — D53.9 MACROCYTIC ANEMIA: Status: ACTIVE | Noted: 2025-01-01

## 2025-07-21 PROBLEM — G30.9 ALZHEIMER DEMENTIA: Status: ACTIVE | Noted: 2025-01-01

## 2025-07-21 PROBLEM — F02.80 ALZHEIMER DEMENTIA: Status: ACTIVE | Noted: 2025-01-01

## 2025-07-21 PROBLEM — Z86.711 HISTORY OF PULMONARY EMBOLISM: Status: ACTIVE | Noted: 2025-07-21

## 2025-07-21 PROBLEM — E03.9 HYPOTHYROIDISM (ACQUIRED): Status: ACTIVE | Noted: 2025-01-01

## 2025-07-21 PROBLEM — R82.71 BACTERIURIA: Status: ACTIVE | Noted: 2025-07-21

## 2025-07-21 NOTE — ED NOTES
" Maxim Henson    Nursing Report ED to Floor:  Mental status: A&O x 0   Ambulatory status: bedrest   Oxygen Therapy:  room air   Cardiac Rhythm: normal sinus   Admitted from: ED  Safety Concerns:  fall risk   Precautions: none   Social Issues: none   ED Room #:  10    ED Nurse Phone Extension - [phone] or may call [phone].      HPI:   Chief Complaint   Patient presents with    Edema    Fall       Past Medical History:  Past Medical History:   Diagnosis Date    Alzheimer dementia with agitation     Anxiety     Atrial fibrillation     Dementia     Gout         Past Surgical History:  No past surgical history on file.     Admitting Doctor:   Radha Yarbrough MD    Consulting Provider(s):  Consults       No orders found from 6/22/2025 to 7/22/2025.             Admitting Diagnosis:   The primary encounter diagnosis was Anemia, unspecified type. Diagnoses of Leg edema, Hematoma of right chest wall, initial encounter, Proctitis, Pleural effusion, and Frequent falls were also pertinent to this visit.    Most Recent Vitals:   Vitals:    07/21/25 1442 07/21/25 1500 07/21/25 1600 07/21/25 1630   BP:   111/66 114/64   Pulse:  75 73 79   Resp:       Temp:       TempSrc:       SpO2:  97% 100% 100%   Weight: 63 kg (139 lb)      Height: 177.8 cm (70\")          Active LDAs/IV Access:   Lines, Drains & Airways       Active LDAs       Name Placement date Placement time Site Days    Peripheral IV 07/21/25 1423 18 G Anterior;Left;Proximal Forearm 07/21/25  1423  Forearm  less than 1                    Labs (abnormal labs have a star):   Labs Reviewed   COMPREHENSIVE METABOLIC PANEL - Abnormal; Notable for the following components:       Result Value    Sodium 152 (*)     Chloride 118 (*)     Total Protein 5.2 (*)     Albumin 2.7 (*)     AST (SGOT) 45 (*)     All other components within normal limits    Narrative:     GFR Categories in Chronic Kidney Disease (CKD)              GFR Category          GFR (mL/min/1.73)    Interpretation  G1          "           90 or greater        Normal or high (1)  G2                    60-89                Mild decrease (1)  G3a                   45-59                Mild to moderate decrease  G3b                   30-44                Moderate to severe decrease  G4                    15-29                Severe decrease  G5                    14 or less           Kidney failure    (1)In the absence of evidence of kidney disease, neither GFR category G1 or G2 fulfill the criteria for CKD.    eGFR calculation 2021 CKD-EPI creatinine equation, which does not include race as a factor   URINALYSIS W/ CULTURE IF INDICATED - Abnormal; Notable for the following components:    Ketones, UA Trace (*)     Protein, UA Trace (*)     Leuk Esterase, UA Trace (*)     All other components within normal limits    Narrative:     In absence of clinical symptoms, the presence of pyuria, bacteria, and/or nitrites on the urinalysis result does not correlate with infection.   CK - Abnormal; Notable for the following components:    Creatine Kinase 702 (*)     All other components within normal limits   TROPONIN - Abnormal; Notable for the following components:    HS Troponin T 142 (*)     All other components within normal limits    Narrative:     High Sensitive Troponin T Reference Range:  <14.0 ng/L- Negative Female for AMI  <22.0 ng/L- Negative Male for AMI  >=14 - Abnormal Female indicating possible myocardial injury.  >=22 - Abnormal Male indicating possible myocardial injury.   Clinicians would have to utilize clinical acumen, EKG, Troponin, and serial changes to determine if it is an Acute Myocardial Infarction or myocardial injury due to an underlying chronic condition.        MAGNESIUM - Abnormal; Notable for the following components:    Magnesium 2.5 (*)     All other components within normal limits   TSH RFX ON ABNORMAL TO FREE T4 - Abnormal; Notable for the following components:    TSH 17.600 (*)     All other components within normal  limits   CBC WITH AUTO DIFFERENTIAL - Abnormal; Notable for the following components:    RBC 2.64 (*)     Hemoglobin 8.4 (*)     Hematocrit 27.0 (*)     .3 (*)     MCHC 31.1 (*)     RDW-SD 55.1 (*)     Neutrophil % 80.0 (*)     Lymphocyte % 8.1 (*)     Lymphocytes, Absolute 0.68 (*)     All other components within normal limits   C-REACTIVE PROTEIN - Abnormal; Notable for the following components:    C-Reactive Protein 6.42 (*)     All other components within normal limits   URINALYSIS, MICROSCOPIC ONLY - Abnormal; Notable for the following components:    Bacteria, UA 2+ (*)     All other components within normal limits   RESPIRATORY PANEL PCR W/ COVID-19 (SARS-COV-2), NP SWAB IN UTM/VTP, 2 HR TAT - Normal    Narrative:     In the setting of a positive respiratory panel with a viral infection PLUS a negative procalcitonin without other underlying concern for bacterial infection, consider observing off antibiotics or discontinuation of antibiotics and continue supportive care. If the respiratory panel is positive for atypical bacterial infection (Bordetella pertussis, Chlamydophila pneumoniae, or Mycoplasma pneumoniae), consider antibiotic de-escalation to target atypical bacterial infection.   BNP (IN-HOUSE) - Normal    Narrative:     This assay is used as an aid in the diagnosis of individuals suspected of having heart failure. It can be used as an aid in the diagnosis of acute decompensated heart failure (ADHF) in patients presenting with signs and symptoms of ADHF to the emergency department (ED). In addition, NT-proBNP of <300 pg/mL indicates ADHF is not likely.    Age Range Result Interpretation  NT-proBNP Concentration (pg/mL:      <50             Positive            >450                   Gray                 300-450                    Negative             <300    50-75           Positive            >900                  Gray                300-900                  Negative            <300      >75      "        Positive            >1800                  Gray                300-1800                  Negative            <300   LACTIC ACID, PLASMA - Normal   PROCALCITONIN - Normal    Narrative:     As a Marker for Sepsis (Non-Neonates):    1. <0.5 ng/mL represents a low risk of severe sepsis and/or septic shock.  2. >2 ng/mL represents a high risk of severe sepsis and/or septic shock.    As a Marker for Lower Respiratory Tract Infections that require antibiotic therapy:    PCT on Admission    Antibiotic Therapy       6-12 Hrs later    >0.5                Strongly Recommended  >0.25 - <0.5        Recommended   0.1 - 0.25          Discouraged              Remeasure/reassess PCT  <0.1                Strongly Discouraged     Remeasure/reassess PCT    As 28 day mortality risk marker: \"Change in Procalcitonin Result\" (>80% or <=80%) if Day 0 (or Day 1) and Day 4 values are available. Refer to http://www.Bilderopct-calculator.com    Change in PCT <=80%  A decrease of PCT levels below or equal to 80% defines a positive change in PCT test result representing a higher risk for 28-day all-cause mortality of patients diagnosed with severe sepsis for septic shock.    Change in PCT >80%  A decrease of PCT levels of more than 80% defines a negative change in PCT result representing a lower risk for 28-day all-cause mortality of patients diagnosed with severe sepsis or septic shock.      T4, FREE - Normal   POCT OCCULT BLOOD STOOL - Normal   HIGH SENSITIVITIY TROPONIN T 1HR   CBC AND DIFFERENTIAL    Narrative:     The following orders were created for panel order CBC & Differential.  Procedure                               Abnormality         Status                     ---------                               -----------         ------                     CBC Auto Differential[[id number]]        Abnormal            Final result                 Please view results for these tests on the individual orders.       Meds Given in ED: "   Medications   sodium chloride 0.9 % flush 10 mL (has no administration in time range)   sodium chloride 0.9 % flush 10 mL (has no administration in time range)   sodium chloride 0.9 % bolus 1,000 mL (0 mL Intravenous Stopped 7/21/25 1700)   mineral oil enema 1 enema (1 enema Rectal Given 7/21/25 1700)           Last NIH score:                                                          Dysphagia screening results:        [place] Coma Scale:  No data recorded     CIWA:        Restraint Type:            Isolation Status:  No active isolations

## 2025-07-21 NOTE — LETTER
[facility]  [address] [facility] [address]  [phone]        July 29, 2025      Patient: Maxim Henson  YOB: 1950  Date of Visit: 7/21/2025      For admission into inpatient hospice at [facility]  Referring Dr. Hernandez   Attending REUBEN JOE  Certifying Dr. NOLAN Bey, RN  [phone]

## 2025-07-21 NOTE — ED PROVIDER NOTES
EMERGENCY DEPARTMENT ENCOUNTER    Pt Name: Maxim Henson  MRN: [id number]  Pt :   1950  Room Number:  10/10  Date of encounter:  2025  PCP: Angel Preston MD  ED Provider: TATYANA Evans    Historian: EMS      HPI:  Chief Complaint: Frequent falls, lower leg edema        Context: Maxim Henson is a 74 y.o. male who presents to the ED c/o frequent falls and lower leg edema.  Patient arrived from [facility] via EMS.  History of A-fib on Xarelto, Alzheimer's dementia, wheelchair-bound.  Per report patient tries to get up from the wheelchair and frequently falls.  Patient not able to provide history for himself.       PAST MEDICAL HISTORY  Past Medical History:   Diagnosis Date    Alzheimer dementia with agitation     Anxiety     Atrial fibrillation     Dementia     Gout          PAST SURGICAL HISTORY  No past surgical history on file.      FAMILY HISTORY  No family history on file.      SOCIAL HISTORY  Social History     Socioeconomic History    Marital status:          ALLERGIES  Cefdinir and Sulfamethoxazole-trimethoprim        REVIEW OF SYSTEMS  Review of Systems       All systems reviewed and negative except for those discussed in HPI.       PHYSICAL EXAM    I have reviewed the triage vital signs and nursing notes.    ED Triage Vitals   Temp Pulse Resp BP SpO2   -- -- -- -- --      Temp src Heart Rate Source Patient Position BP Location FiO2 (%)   -- -- -- -- --       Physical Exam  GENERAL:   Chronically ill-appearing  HENT: Nares patent.  EYES: No scleral icterus.  CV:  irregular rhythm, regular rate.  Bilateral lower extremity pitting   RESPIRATORY: Tachypnea.  No audible wheezes, rales or rhonchi.  ABDOMEN: Soft, nontender  MUSCULOSKELETAL: No deformities.   NEURO: At baseline  SKIN: Warm, dry, no rash visualized.  Multiple hematomas in various stages of healing  to the torso and bilateral upper extremities, pressure ulcer stage 2 sacral area       LAB RESULTS  Recent Results  (from the past 24 hours)   ECG 12 Lead Dyspnea    Collection Time: 07/21/25  2:25 PM   Result Value Ref Range    QT Interval 338 ms    QTC Interval 359 ms   Comprehensive Metabolic Panel    Collection Time: 07/21/25  2:32 PM    Specimen: Blood   Result Value Ref Range    Glucose 97 65 - 99 mg/dL    BUN 15.1 8.0 - 23.0 mg/dL    Creatinine 0.81 0.76 - 1.27 mg/dL    Sodium 152 (H) 136 - 145 mmol/L    Potassium 3.7 3.5 - 5.2 mmol/L    Chloride 118 (H) 98 - 107 mmol/L    CO2 24.8 22.0 - 29.0 mmol/L    Calcium 8.7 8.6 - 10.5 mg/dL    Total Protein 5.2 (L) 6.0 - 8.5 g/dL    Albumin 2.7 (L) 3.5 - 5.2 g/dL    ALT (SGPT) 41 1 - 41 U/L    AST (SGOT) 45 (H) 1 - 40 U/L    Alkaline Phosphatase 61 39 - 117 U/L    Total Bilirubin 0.6 0.0 - 1.2 mg/dL    Globulin 2.5 gm/dL    A/G Ratio 1.1 g/dL    BUN/Creatinine Ratio 18.6 7.0 - 25.0    Anion Gap 9.2 5.0 - 15.0 mmol/L    eGFR 91.4 >60.0 mL/min/1.73   CK    Collection Time: 07/21/25  2:32 PM    Specimen: Blood   Result Value Ref Range    Creatine Kinase 702 (H) 20 - 200 U/L   BNP    Collection Time: 07/21/25  2:32 PM    Specimen: Blood   Result Value Ref Range    proBNP 345.0 0.0 - 1,800.0 pg/mL   High Sensitivity Troponin T    Collection Time: 07/21/25  2:32 PM    Specimen: Blood   Result Value Ref Range    HS Troponin T 142 (C) <22 ng/L   Magnesium    Collection Time: 07/21/25  2:32 PM    Specimen: Blood   Result Value Ref Range    Magnesium 2.5 (H) 1.6 - 2.4 mg/dL   TSH Rfx On Abnormal To Free T4    Collection Time: 07/21/25  2:32 PM    Specimen: Blood   Result Value Ref Range    TSH 17.600 (H) 0.270 - 4.200 uIU/mL   CBC Auto Differential    Collection Time: 07/21/25  2:32 PM    Specimen: Blood   Result Value Ref Range    WBC 8.35 3.40 - 10.80 10*3/mm3    RBC 2.64 (L) 4.14 - 5.80 10*6/mm3    Hemoglobin 8.4 (L) 13.0 - 17.7 g/dL    Hematocrit 27.0 (L) 37.5 - 51.0 %    .3 (H) 79.0 - 97.0 fL    MCH 31.8 26.6 - 33.0 pg    MCHC 31.1 (L) 31.5 - 35.7 g/dL    RDW 14.6 12.3 - 15.4 %     RDW-SD 55.1 (H) 37.0 - 54.0 fl    MPV 9.3 6.0 - 12.0 fL    Platelets 244 140 - 450 10*3/mm3    Neutrophil % 80.0 (H) 42.7 - 76.0 %    Lymphocyte % 8.1 (L) 19.6 - 45.3 %    Monocyte % 10.2 5.0 - 12.0 %    Eosinophil % 0.6 0.3 - 6.2 %    Basophil % 0.6 0.0 - 1.5 %    Immature Grans % 0.5 0.0 - 0.5 %    Neutrophils, Absolute 6.68 1.70 - 7.00 10*3/mm3    Lymphocytes, Absolute 0.68 (L) 0.70 - 3.10 10*3/mm3    Monocytes, Absolute 0.85 0.10 - 0.90 10*3/mm3    Eosinophils, Absolute 0.05 0.00 - 0.40 10*3/mm3    Basophils, Absolute 0.05 0.00 - 0.20 10*3/mm3    Immature Grans, Absolute 0.04 0.00 - 0.05 10*3/mm3    nRBC 0.0 0.0 - 0.2 /100 WBC   Lactic Acid, Plasma    Collection Time: 07/21/25  2:32 PM    Specimen: Blood   Result Value Ref Range    Lactate 1.2 0.5 - 2.0 mmol/L   Procalcitonin    Collection Time: 07/21/25  2:32 PM    Specimen: Blood   Result Value Ref Range    Procalcitonin 0.12 0.00 - 0.25 ng/mL   C-reactive Protein    Collection Time: 07/21/25  2:32 PM    Specimen: Blood   Result Value Ref Range    C-Reactive Protein 6.42 (H) 0.00 - 0.50 mg/dL   T4, Free    Collection Time: 07/21/25  2:32 PM    Specimen: Blood   Result Value Ref Range    Free T4 1.21 0.92 - 1.68 ng/dL   Urinalysis With Culture If Indicated - Straight Cath    Collection Time: 07/21/25  2:39 PM    Specimen: Straight Cath; Urine   Result Value Ref Range    Color, UA Yellow Yellow, Straw    Appearance, UA Clear Clear    pH, UA 5.5 5.0 - 8.0    Specific Gravity, UA 1.025 1.005 - 1.030    Glucose, UA Negative Negative    Ketones, UA Trace (A) Negative    Bilirubin, UA Negative Negative    Blood, UA Negative Negative    Protein, UA Trace (A) Negative    Leuk Esterase, UA Trace (A) Negative    Nitrite, UA Negative Negative    Urobilinogen, UA 1.0 E.U./dL 0.2 - 1.0 E.U./dL   Urinalysis, Microscopic Only - Straight Cath    Collection Time: 07/21/25  2:39 PM    Specimen: Straight Cath; Urine   Result Value Ref Range    RBC, UA 0-2 None Seen, 0-2 /HPF     WBC, UA 0-2 None Seen, 0-2 /HPF    Bacteria, UA 2+ (A) None Seen /HPF    Squamous Epithelial Cells, UA 0-2 None Seen, 0-2 /HPF    Hyaline Casts, UA 0-2 None Seen /LPF    Methodology Manual Light Microscopy    Respiratory Panel PCR w/COVID-19(SARS-CoV-2) MICHAEL/RICKY/QASIM/PAD/COR/FRANNIE In-House, NP Swab in UTM/VTM, 2 HR TAT - Swab, Nasopharynx    Collection Time: 07/21/25  3:04 PM    Specimen: Nasopharynx; Swab   Result Value Ref Range    ADENOVIRUS, PCR Not Detected Not Detected    Coronavirus 229E Not Detected Not Detected    Coronavirus HKU1 Not Detected Not Detected    Coronavirus NL63 Not Detected Not Detected    Coronavirus OC43 Not Detected Not Detected    COVID19 Not Detected Not Detected - Ref. Range    Human Metapneumovirus Not Detected Not Detected    Human Rhinovirus/Enterovirus Not Detected Not Detected    Influenza A PCR Not Detected Not Detected    Influenza B PCR Not Detected Not Detected    Parainfluenza Virus 1 Not Detected Not Detected    Parainfluenza Virus 2 Not Detected Not Detected    Parainfluenza Virus 3 Not Detected Not Detected    Parainfluenza Virus 4 Not Detected Not Detected    RSV, PCR Not Detected Not Detected    Bordetella pertussis pcr Not Detected Not Detected    Bordetella parapertussis PCR Not Detected Not Detected    Chlamydophila pneumoniae PCR Not Detected Not Detected    Mycoplasma pneumo by PCR Not Detected Not Detected   POC Occult Blood Stool    Collection Time: 07/21/25  3:49 PM    Specimen: Per Rectum; Stool   Result Value Ref Range    Fecal Occult Blood Negative     Lot Number [id number]     Expiration Date 4/27     DEVELOPER LOT NUMBER [id number]     DEVELOPER EXPIRATION DATE 2,027/12     Positive Control Positive     Negative Control Negative    High Sensitivity Troponin T 1Hr    Collection Time: 07/21/25  4:28 PM    Specimen: Blood   Result Value Ref Range    HS Troponin T 115 (C) <22 ng/L    Troponin T Numeric Delta -27 ng/L    Troponin T % Delta -19 Abnormal if >/= 20%       If  labs were ordered, I independently reviewed the results and considered them in treating the patient.        RADIOLOGY  CT Chest Without Contrast Diagnostic  Result Date: 7/21/2025  CT CHEST WO CONTRAST DIAGNOSTIC, CT ABDOMEN PELVIS WO CONTRAST Date of Exam: 7/21/2025 3:16 PM EDT Indication: Anterior chest bruising, frequent falls. Comparison: None available. Technique: Axial CT images were obtained of the chest abdomen and pelvis without contrast administration.  Reconstructed coronal and sagittal images were also obtained. Automated exposure control and iterative construction methods were used. Findings: CT CHEST: MEDIASTINUM: There is a moderate size sliding hiatal hernia. There are aortic valvular calcifications aortic and heart size are normal. No mass nor pericardial effusion. CORONARY ARTERIES: There is calcified atherosclerotic disease. LUNGS: There is minimal right basal airspace disease, likely atelectasis. No additional consolidation. No significant nodule nor interstitial changes. PLEURAL SPACE: Trace right pleural effusion. LYMPH NODES: No pathologically enlarged thoracic nodes. There is a heterogeneous mass centered within the right pectoralis major muscle which is only partially visualized given field-of-view. This measures at least 15 cm in transverse dimension by 5 cm in AP dimension. This likely represents an intramuscular hematoma. There is surrounding subcutaneous edema/ecchymosis. Correlate with history as neoplasm not excluded. CT ABDOMEN AND PELVIS:  LIVER:  Unremarkable parenchyma without focal lesion. BILIARY/GALLBLADDER:  Unremarkable SPLEEN:  Unremarkable PANCREAS:  Unremarkable ADRENAL:  Unremarkable KIDNEYS:  Unremarkable parenchyma with no solid mass identified. No obstruction.  No calculus identified. GASTROINTESTINAL/MESENTERY: There is mural thickening with moderate fecal load involving the rectum. There is associated perirectal/presacral edema. 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 Correlate with history and symptoms. AORTA/IVC: IVC filter is present on imaging study. It is recommended that all patients with IVC filters in place have an active management care plan to monitor their IVC filter. If a care plan is not in place, patient should have a non emergent referral to an interventional specialist such as interventional radiology or other interventional vascular specialist for establishment of an IVC filter management care plan. RETROPERITONEUM/LYMPH NODES:  Unremarkable REPRODUCTIVE:  Unremarkable BLADDER:  Unremarkable OSSEUS STRUCTURES:  Typical for age with no acute process identified. There is right anterior lateral abdominal wall subcutaneous edema versus ecchymosis.     Impression: 1.There is a heterogeneous mass centered within the right pectoralis major muscle which is only partially visualized given field-of-view. This measures at least 15 cm in transverse dimension by 5 cm in AP dimension. This likely represents an intramuscular hematoma. There is surrounding subcutaneous edema/ecchymosis. Correlate with history as neoplasm not excluded. 2.There is mural thickening with moderate fecal load involving the rectum. There is associated perirectal/presacral edema. 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 Correlate with history and symptoms. 3.There is right anterior lateral abdominal wall subcutaneous edema versus ecchymosis. 4.Trace right pleural effusion. 5.Moderate size sliding hiatal hernia. Electronically Signed: Clark Espinoza MD  7/21/2025 3:48 PM EDT  Workstation ID: [id number]    CT Abdomen Pelvis Without Contrast  Result Date: 7/21/2025  CT CHEST WO CONTRAST DIAGNOSTIC, CT ABDOMEN PELVIS WO CONTRAST Date of Exam: 7/21/2025 3:16 PM EDT Indication: Anterior chest bruising, frequent falls. Comparison: None available. Technique: Axial CT images were obtained of the chest abdomen and pelvis without contrast administration.  Reconstructed coronal and sagittal images  were also obtained. Automated exposure control and iterative construction methods were used. Findings: CT CHEST: MEDIASTINUM: There is a moderate size sliding hiatal hernia. There are aortic valvular calcifications aortic and heart size are normal. No mass nor pericardial effusion. CORONARY ARTERIES: There is calcified atherosclerotic disease. LUNGS: There is minimal right basal airspace disease, likely atelectasis. No additional consolidation. No significant nodule nor interstitial changes. PLEURAL SPACE: Trace right pleural effusion. LYMPH NODES: No pathologically enlarged thoracic nodes. There is a heterogeneous mass centered within the right pectoralis major muscle which is only partially visualized given field-of-view. This measures at least 15 cm in transverse dimension by 5 cm in AP dimension. This likely represents an intramuscular hematoma. There is surrounding subcutaneous edema/ecchymosis. Correlate with history as neoplasm not excluded. CT ABDOMEN AND PELVIS:  LIVER:  Unremarkable parenchyma without focal lesion. BILIARY/GALLBLADDER:  Unremarkable SPLEEN:  Unremarkable PANCREAS:  Unremarkable ADRENAL:  Unremarkable KIDNEYS:  Unremarkable parenchyma with no solid mass identified. No obstruction.  No calculus identified. GASTROINTESTINAL/MESENTERY: There is mural thickening with moderate fecal load involving the rectum. There is associated perirectal/presacral edema. 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 Correlate with history and symptoms. AORTA/IVC: IVC filter is present on imaging study. It is recommended that all patients with IVC filters in place have an active management care plan to monitor their IVC filter. If a care plan is not in place, patient should have a non emergent referral to an interventional specialist such as interventional radiology or other interventional vascular specialist for establishment of an IVC filter management care plan. RETROPERITONEUM/LYMPH NODES:  Unremarkable  REPRODUCTIVE:  Unremarkable BLADDER:  Unremarkable OSSEUS STRUCTURES:  Typical for age with no acute process identified. There is right anterior lateral abdominal wall subcutaneous edema versus ecchymosis.     Impression: 1.There is a heterogeneous mass centered within the right pectoralis major muscle which is only partially visualized given field-of-view. This measures at least 15 cm in transverse dimension by 5 cm in AP dimension. This likely represents an intramuscular hematoma. There is surrounding subcutaneous edema/ecchymosis. Correlate with history as neoplasm not excluded. 2.There is mural thickening with moderate fecal load involving the rectum. There is associated perirectal/presacral edema. 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 Correlate with history and symptoms. 3.There is right anterior lateral abdominal wall subcutaneous edema versus ecchymosis. 4.Trace right pleural effusion. 5.Moderate size sliding hiatal hernia. Electronically Signed: Clark Espinoza MD  7/21/2025 3:48 PM EDT  Workstation ID: [id number]    CT Head Without Contrast  Result Date: 7/21/2025  CT HEAD WO CONTRAST Date of Exam: 7/21/2025 3:16 PM EDT Indication: frequent falls. Comparison: 7/9/2025 Technique: Axial CT images were obtained of the head without contrast administration.  Automated exposure control and iterative construction methods were used. Findings: There is no evidence of acute territorial infarction. There is no acute intracranial hemorrhage. There are no extra-axial collections. Ventricles and CSF spaces are symmetrically prominent. No mass effect nor hydrocephalus. Brain parenchyma appears unchanged.  Paranasal sinuses and mastoid air cells are adequately aerated.  Osseous structures and orbits appear intact.     Impression: 1.No acute intracranial process is identified. 2.Generalized atrophy. Electronically Signed: Clark Espinoza MD  7/21/2025 3:36 PM EDT  Workstation ID: [id number]    XR Chest 1 View  Result  Date: 7/21/2025  XR CHEST 1 VW Date of Exam: 7/21/2025 2:10 PM EDT Indication: Leg edema Comparison: None available. Findings: Normal cardiomediastinal silhouette. The lungs are clear. No pleural effusion or pneumothorax. No acute osseous findings.     Impression: No acute cardiopulmonary findings. Electronically Signed: Agusto Goodman MD  7/21/2025 2:26 PM EDT  Workstation ID: [id number]      I ordered and independently reviewed the above noted radiographic studies.      I viewed images of CT of the abdomen which showed constipation per my independent interpretation.    See radiologist's dictation for official interpretation.        PROCEDURES    Procedures    ECG 12 Lead Dyspnea   Final Result   Test Reason : Dyspnea   Blood Pressure :   */*   mmHG   Vent. Rate :  68 BPM     Atrial Rate :  68 BPM      P-R Int : 124 ms          QRS Dur :  76 ms       QT Int : 338 ms       P-R-T Axes :  83  53 123 degrees     QTcB Int : 359 ms      Normal sinus rhythm   ST & T wave abnormality, consider lateral ischemia   Abnormal ECG   No previous ECGs available   Confirmed by HERNÁN JUDD MD ([id number]) on 7/21/2025 3:41:03 PM      Referred By: edmd           Confirmed By: HERNÁN JUDD MD      Telemetry Scan   Final Result          MEDICATIONS GIVEN IN ER    Medications   sodium chloride 0.9 % flush 10 mL (has no administration in time range)   sodium chloride 0.9 % flush 10 mL (has no administration in time range)   sodium chloride 0.9 % bolus 1,000 mL (0 mL Intravenous Stopped 7/21/25 1700)   mineral oil enema 1 enema (1 enema Rectal Given 7/21/25 1700)         MEDICAL DECISION MAKING, PROGRESS, and CONSULTS    All labs, if obtained, have been independently reviewed by me.  All radiology studies, if obtained, have been reviewed by me and the radiologist dictating the report.  All EKG's, if obtained, have been independently viewed and interpreted by me/my attending physician.      Discussion below represents my analysis of pertinent  findings related to patient's condition, differential diagnosis, treatment plan and final disposition.  Patient is a 74-year-old male with history of Alzheimer's dementia, atrial fibrillation on Xarelto presents from nursing facility for evaluation of frequent falls and lower extremity edema.  On physical exam he was ill-appearing, multiple hematomas to the chest, abdomen and bilateral upper extremities noted.  Pitting edema to bilateral lower extremities.  Lab work significant for elevated creatinine kinase 72, elevated troponin 142, normal creatinine 0.81, anemia 8.4, 27.0, TSH 17.600, free T4 1.21, 2+ bacteria on urinalysis, negative respiratory panel, CT of the brain shows no acute or cranial process, CT chest and abdomen-chest wall hematoma, stercoral proctitis, trace right pleural effusion and hiatal hernia.  Patient received 1 L of normal saline, hospital service consulted for admission.  Patient is DNR                       Differential diagnosis:    Chest wall hematoma, rib fracture, solid organ injury, hollow organ injury, GI bleed, anemia of chronic disease, failure to thrive      Additional sources:    - Discussed/ obtained information from independent historians: EMS, spouse    - External (non-ED) record review:  [facility] ER visit on 7/9/2025, patient was seen after the fall, laceration of the scalp was repaired    - Chronic or social conditions impacting care: Alzheimer's dementia, nursing home resident    - Shared decision making: Family, spouse      Orders placed during this visit:  Orders Placed This Encounter   Procedures    Respiratory Panel PCR w/COVID-19(SARS-CoV-2) [facility]HAEL/RICKY/QASIM/[facility]ANNIE In-House, NP Swab in UTM/VTM, 2 HR TAT - Swab, Nasopharynx    XR Chest 1 View    CT Chest Without Contrast Diagnostic    CT Abdomen Pelvis Without Contrast    CT Head Without Contrast    Comprehensive Metabolic Panel    Urinalysis With Culture If Indicated - Urine, Catheter    CK    BNP    High Sensitivity Troponin  T    Magnesium    TSH Rfx On Abnormal To Free T4    CBC Auto Differential    Lactic Acid, Plasma    Procalcitonin    C-reactive Protein    Urinalysis, Microscopic Only - Urine, Clean Catch    T4, Free    High Sensitivity Troponin T 1Hr    POC Occult Blood Stool    ECG 12 Lead Dyspnea    Telemetry Scan    Insert Peripheral IV    Insert Peripheral IV    Inpatient Admission    CBC & Differential         Additional orders considered but not ordered:      ED Course:    Consultants:      ED Course as of 07/21/25 1714   Mon Jul 21, 2025   1500 Hemoglobin(!): 8.4 [IR]   1500 Hematocrit(!): 27.0 [IR]   1528 Creatine Kinase(!): 702 [IR]   1531 TSH Baseline(!): 17.600 [IR]   1541 Normal rectum, stool is nonbloody, yellow, POC occult blood negative [IR]   1542 Creatinine: 0.81 [IR]   1542 Sodium(!): 152 [IR]   1542 Chloride(!): 118 [IR]   1629 Patient's son and wife are at bedside.  Wishes to remain DNR.  Wife told me that patient was receiving his maintenance medication at nursing facility with small Cup of  yoghurt [IR]      ED Course User Index  [IR] Farheen Mcclendon APRN              Shared Decision Making:  After my consideration of clinical presentation and any laboratory/radiology studies obtained, I discussed the findings with the patient/patient representative who is in agreement with the treatment plan and the final disposition.   Risks and benefits of discharge and/or observation/admission were discussed.       AS OF 17:14 EDT VITALS:    BP - 124/69  HR - 86  TEMP - 99.1 °F (37.3 °C) (Oral)  O2 SATS - 100%                  DIAGNOSIS  Final diagnoses:   Anemia, unspecified type   Leg edema   Hematoma of right chest wall, initial encounter   Proctitis   Pleural effusion   Frequent falls         DISPOSITION  admit      Please note that portions of this document were completed with voice recognition software.     TATYANA Evans   07/21/25   17:14 EDT        Farheen Mcclendon APRN  07/21/25 1824

## 2025-07-21 NOTE — H&P
"    [facility] Medicine Services  HISTORY AND PHYSICAL    Patient Name: Maxim Henson  : 1950  MRN: [id number]  Primary Care Physician: Angel Preston MD  Date of admission: 2025    Subjective   Subjective     Chief Complaint:  Frequent falls, lower leg edema    HPI:  Maxim Henson is a 74 y.o. male with a past medical history significant for alzheimer dementia with agitation, atrial fibrillation, pulmonary embolism and gout presents to the ED from [facility] facility due to frequent falls and lower extremity edema.  Patient has no family at bedside and has severe dementia therefore HPI is limited.  Per report patient is wheelchair bound and tries to get up frequently and falls.  Spoke to Wife by phone and she reports patient has been at [facility] for about three weeks now.  She had taken care of him for about a year which was difficult.  About a week ago she reports patient was taken to the ED for a fall and ultimately suffered a head laceration needing staples.  Today she received a phone call from staff saying the patient had fallen and had a \"big bruise to his chest.\"          Review of Systems   Unable to perform ROS: Dementia                Personal History     Past Medical History:   Diagnosis Date   • Alzheimer dementia with agitation    • Anxiety    • Atrial fibrillation    • Dementia    • Gout              No past surgical history on file.    Family History:  family history is not on file.     Social History:    Social History     Social History Narrative   • Not on file       Medications:  OLANZapine zydis, levothyroxine, memantine, mirtazapine, rivaroxaban, and sodium-potassium-magnesium sulfates    Allergies   Allergen Reactions   • Cefdinir Other (See Comments)     Unknown   • Sulfamethoxazole-Trimethoprim Other (See Comments)     Unknown       Objective   Objective     Vital Signs:   Temp:  [99.1 °F (37.3 °C)] 99.1 °F (37.3 °C)  Heart Rate:  [73-96] " 96  Resp:  [18] 18  BP: (104-144)/(56-99) 144/99    Physical Exam  Vitals and nursing note reviewed.   Constitutional:       General: He is not in acute distress.     Appearance: Normal appearance. He is not ill-appearing, toxic-appearing or diaphoretic.   HENT:      Head: Normocephalic and atraumatic.      Nose: Nose normal.      Mouth/Throat:      Mouth: Mucous membranes are dry.      Pharynx: Oropharynx is clear.   Eyes:      Extraocular Movements: Extraocular movements intact.      Conjunctiva/sclera: Conjunctivae normal.      Pupils: Pupils are equal, round, and reactive to light.   Cardiovascular:      Rate and Rhythm: Normal rate and regular rhythm.      Pulses: Normal pulses.      Heart sounds: Normal heart sounds.   Pulmonary:      Effort: Pulmonary effort is normal.      Breath sounds: Normal breath sounds.   Abdominal:      General: Bowel sounds are normal. There is no distension.      Palpations: Abdomen is soft. There is no mass.      Tenderness: There is no abdominal tenderness. There is no right CVA tenderness, left CVA tenderness, guarding or rebound.      Hernia: No hernia is present.   Musculoskeletal:         General: No swelling, tenderness, deformity or signs of injury.      Cervical back: Normal range of motion and neck supple.      Right lower leg: Edema present.      Left lower leg: Edema present.   Skin:     General: Skin is warm and dry.      Comments: Scattered bruising to arms, legs and hematoma to chest.  Abrasions to bilateral arms.    Neurological:      Mental Status: He is alert. Mental status is at baseline.   Psychiatric:      Comments: Non-communicative             Result Review:  I have personally reviewed the results from the time of this admission to 7/21/2025 19:58 EDT and agree with these findings:  [x]  Laboratory list / accordion  [x]  Microbiology  [x]  Radiology  []  EKG/Telemetry   []  Cardiology/Vascular   []  Pathology  []  Old records  []  Other:  Most notable findings  include:     LAB RESULTS:      Lab 07/21/25  1432   WBC 8.35   HEMOGLOBIN 8.4*   HEMATOCRIT 27.0*   PLATELETS 244   NEUTROS ABS 6.68   IMMATURE GRANS (ABS) 0.04   LYMPHS ABS 0.68*   MONOS ABS 0.85   EOS ABS 0.05   .3*   CRP 6.42*   PROCALCITONIN 0.12   LACTATE 1.2   CK TOTAL 702*         Lab 07/21/25  1905 07/21/25  1432   SODIUM 149* 152*   POTASSIUM 3.5 3.7   CHLORIDE 117* 118*   CO2 26.0 24.8   ANION GAP 6.0 9.2   BUN 14.4 15.1   CREATININE 0.76 0.81   EGFR 93.2 91.4   GLUCOSE 96 97   CALCIUM 8.0* 8.7   MAGNESIUM  --  2.5*   TSH  --  17.600*         Lab 07/21/25  1432   TOTAL PROTEIN 5.2*   ALBUMIN 2.7*   GLOBULIN 2.5   ALT (SGPT) 41   AST (SGOT) 45*   BILIRUBIN 0.6   ALK PHOS 61         Lab 07/21/25  1628 07/21/25  1432   PROBNP  --  345.0   HSTROP T 115* 142*                 Brief Urine Lab Results  (Last result in the past 365 days)        Color   Clarity   Blood   Leuk Est   Nitrite   Protein   CREAT   Urine HCG        07/21/25 1439 Yellow   Clear   Negative   Trace   Negative   Trace                 Microbiology Results (last 10 days)       Procedure Component Value - Date/Time    Respiratory Panel PCR w/COVID-19(SARS-CoV-2) MICHAEL/RICKY/QASIM/PAD/COR/FRANNIE In-House, NP Swab in UTM/VTM, 2 HR TAT - Swab, Nasopharynx [[id number]]  (Normal) Collected: 07/21/25 1504    Lab Status: Final result Specimen: Swab from Nasopharynx Updated: 07/21/25 1604     ADENOVIRUS, PCR Not Detected     Coronavirus 229E Not Detected     Coronavirus HKU1 Not Detected     Coronavirus NL63 Not Detected     Coronavirus OC43 Not Detected     COVID19 Not Detected     Human Metapneumovirus Not Detected     Human Rhinovirus/Enterovirus Not Detected     Influenza A PCR Not Detected     Influenza B PCR Not Detected     Parainfluenza Virus 1 Not Detected     Parainfluenza Virus 2 Not Detected     Parainfluenza Virus 3 Not Detected     Parainfluenza Virus 4 Not Detected     RSV, PCR Not Detected     Bordetella pertussis pcr Not Detected      Bordetella parapertussis PCR Not Detected     Chlamydophila pneumoniae PCR Not Detected     Mycoplasma pneumo by PCR Not Detected    Narrative:      In the setting of a positive respiratory panel with a viral infection PLUS a negative procalcitonin without other underlying concern for bacterial infection, consider observing off antibiotics or discontinuation of antibiotics and continue supportive care. If the respiratory panel is positive for atypical bacterial infection (Bordetella pertussis, Chlamydophila pneumoniae, or Mycoplasma pneumoniae), consider antibiotic de-escalation to target atypical bacterial infection.            CT Chest Without Contrast Diagnostic  Result Date: 7/21/2025  CT CHEST WO CONTRAST DIAGNOSTIC, CT ABDOMEN PELVIS WO CONTRAST Date of Exam: 7/21/2025 3:16 PM EDT Indication: Anterior chest bruising, frequent falls. Comparison: None available. Technique: Axial CT images were obtained of the chest abdomen and pelvis without contrast administration.  Reconstructed coronal and sagittal images were also obtained. Automated exposure control and iterative construction methods were used. Findings: CT CHEST: MEDIASTINUM: There is a moderate size sliding hiatal hernia. There are aortic valvular calcifications aortic and heart size are normal. No mass nor pericardial effusion. CORONARY ARTERIES: There is calcified atherosclerotic disease. LUNGS: There is minimal right basal airspace disease, likely atelectasis. No additional consolidation. No significant nodule nor interstitial changes. PLEURAL SPACE: Trace right pleural effusion. LYMPH NODES: No pathologically enlarged thoracic nodes. There is a heterogeneous mass centered within the right pectoralis major muscle which is only partially visualized given field-of-view. This measures at least 15 cm in transverse dimension by 5 cm in AP dimension. This likely represents an intramuscular hematoma. There is surrounding subcutaneous edema/ecchymosis.  Correlate with history as neoplasm not excluded. CT ABDOMEN AND PELVIS:  LIVER:  Unremarkable parenchyma without focal lesion. BILIARY/GALLBLADDER:  Unremarkable SPLEEN:  Unremarkable PANCREAS:  Unremarkable ADRENAL:  Unremarkable KIDNEYS:  Unremarkable parenchyma with no solid mass identified. No obstruction.  No calculus identified. GASTROINTESTINAL/MESENTERY: There is mural thickening with moderate fecal load involving the rectum. There is associated perirectal/presacral edema. 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 Correlate with history and symptoms. AORTA/IVC: IVC filter is present on imaging study. It is recommended that all patients with IVC filters in place have an active management care plan to monitor their IVC filter. If a care plan is not in place, patient should have a non emergent referral to an interventional specialist such as interventional radiology or other interventional vascular specialist for establishment of an IVC filter management care plan. RETROPERITONEUM/LYMPH NODES:  Unremarkable REPRODUCTIVE:  Unremarkable BLADDER:  Unremarkable OSSEUS STRUCTURES:  Typical for age with no acute process identified. There is right anterior lateral abdominal wall subcutaneous edema versus ecchymosis.     Impression: Impression: 1.There is a heterogeneous mass centered within the right pectoralis major muscle which is only partially visualized given field-of-view. This measures at least 15 cm in transverse dimension by 5 cm in AP dimension. This likely represents an intramuscular hematoma. There is surrounding subcutaneous edema/ecchymosis. Correlate with history as neoplasm not excluded. 2.There is mural thickening with moderate fecal load involving the rectum. There is associated perirectal/presacral edema. 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 Correlate with history and symptoms. 3.There is right anterior lateral abdominal wall subcutaneous edema versus ecchymosis. 4.Trace right  pleural effusion. 5.Moderate size sliding hiatal hernia. Electronically Signed: Clark Espinoza MD  7/21/2025 3:48 PM EDT  Workstation ID: [id number]    CT Abdomen Pelvis Without Contrast  Result Date: 7/21/2025  CT CHEST WO CONTRAST DIAGNOSTIC, CT ABDOMEN PELVIS WO CONTRAST Date of Exam: 7/21/2025 3:16 PM EDT Indication: Anterior chest bruising, frequent falls. Comparison: None available. Technique: Axial CT images were obtained of the chest abdomen and pelvis without contrast administration.  Reconstructed coronal and sagittal images were also obtained. Automated exposure control and iterative construction methods were used. Findings: CT CHEST: MEDIASTINUM: There is a moderate size sliding hiatal hernia. There are aortic valvular calcifications aortic and heart size are normal. No mass nor pericardial effusion. CORONARY ARTERIES: There is calcified atherosclerotic disease. LUNGS: There is minimal right basal airspace disease, likely atelectasis. No additional consolidation. No significant nodule nor interstitial changes. PLEURAL SPACE: Trace right pleural effusion. LYMPH NODES: No pathologically enlarged thoracic nodes. There is a heterogeneous mass centered within the right pectoralis major muscle which is only partially visualized given field-of-view. This measures at least 15 cm in transverse dimension by 5 cm in AP dimension. This likely represents an intramuscular hematoma. There is surrounding subcutaneous edema/ecchymosis. Correlate with history as neoplasm not excluded. CT ABDOMEN AND PELVIS:  LIVER:  Unremarkable parenchyma without focal lesion. BILIARY/GALLBLADDER:  Unremarkable SPLEEN:  Unremarkable PANCREAS:  Unremarkable ADRENAL:  Unremarkable KIDNEYS:  Unremarkable parenchyma with no solid mass identified. No obstruction.  No calculus identified. GASTROINTESTINAL/MESENTERY: There is mural thickening with moderate fecal load involving the rectum. There is associated perirectal/presacral edema. Findings  are suggestive of stercoral proctitis. Correlate with history and symptoms. AORTA/IVC: IVC filter is present on imaging study. It is recommended that all patients with IVC filters in place have an active management care plan to monitor their IVC filter. If a care plan is not in place, patient should have a non emergent referral to an interventional specialist such as interventional radiology or other interventional vascular specialist for establishment of an IVC filter management care plan. RETROPERITONEUM/LYMPH NODES:  Unremarkable REPRODUCTIVE:  Unremarkable BLADDER:  Unremarkable OSSEUS STRUCTURES:  Typical for age with no acute process identified. There is right anterior lateral abdominal wall subcutaneous edema versus ecchymosis.     Impression: Impression: 1.There is a heterogeneous mass centered within the right pectoralis major muscle which is only partially visualized given field-of-view. This measures at least 15 cm in transverse dimension by 5 cm in AP dimension. This likely represents an intramuscular hematoma. There is surrounding subcutaneous edema/ecchymosis. Correlate with history as neoplasm not excluded. 2.There is mural thickening with moderate fecal load involving the rectum. There is associated perirectal/presacral edema. 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 Correlate with history and symptoms. 3.There is right anterior lateral abdominal wall subcutaneous edema versus ecchymosis. 4.Trace right pleural effusion. 5.Moderate size sliding hiatal hernia. Electronically Signed: Clark Espinoza MD  7/21/2025 3:48 PM EDT  Workstation ID: [id number]    CT Head Without Contrast  Result Date: 7/21/2025  CT HEAD WO CONTRAST Date of Exam: 7/21/2025 3:16 PM EDT Indication: frequent falls. Comparison: 7/9/2025 Technique: Axial CT images were obtained of the head without contrast administration.  Automated exposure control and iterative construction methods were used. Findings: There is no evidence of  acute territorial infarction. There is no acute intracranial hemorrhage. There are no extra-axial collections. Ventricles and CSF spaces are symmetrically prominent. No mass effect nor hydrocephalus. Brain parenchyma appears unchanged.  Paranasal sinuses and mastoid air cells are adequately aerated.  Osseous structures and orbits appear intact.     Impression: Impression: 1.No acute intracranial process is identified. 2.Generalized atrophy. Electronically Signed: Clark Espinoza MD  7/21/2025 3:36 PM EDT  Workstation ID: [id number]    XR Chest 1 View  Result Date: 7/21/2025  XR CHEST 1 VW Date of Exam: 7/21/2025 2:10 PM EDT Indication: Leg edema Comparison: None available. Findings: Normal cardiomediastinal silhouette. The lungs are clear. No pleural effusion or pneumothorax. No acute osseous findings.     Impression: Impression: No acute cardiopulmonary findings. Electronically Signed: Agusto Goodman MD  7/21/2025 2:26 PM EDT  Workstation ID: [id number]          Assessment & Plan   Assessment & Plan       Hypernatremia    Alzheimer dementia    Hypocalcemia    Bacteriuria    Macrocytic anemia    Hypothyroidism (acquired)    History of pulmonary embolism      74 year old male presents to the ED from [facility] after a fall.     1) Hypernatremia  -, 149  -Approximately 1.5L water deficit   -received 1L normal saline in the ED, receiving 500 ml bolus now  -serial BMP   -TSH 17.600 Free T4 1.21  -CT head as noted above   -strict I &O's   -start D5W IVF, glucose currently 98    2) Frequent Falls       ? Right pectoralis muscle hematoma       Wheelchair bound   -CT chest as noted above  -hold xarelto for now   -trend H&H overnight   -type and screen   -fall precautions     3) Stercoral proctitis  -CT abdomen and pelvis noted above  -mineral oil enema given in the ED  -continue bowel regimen overnight     4) Macrocytic anemia  -H&H 8.4/27.0  -trend overnight   -type and screen   -anemia studies     5)  Advanced alzheimers dementia      Malnourished   -at baseline not communicative   -consult nutrition in am   -continue IVF   -consider albumin   -check ionized calcium   -consult palliative in am   -on remeron, namenda, zyprexa     6) Hypothyroidism  -TSH and Free T4 noted above  -continue synthroid     7) Bacteruria  -UA above  -will check urine cultures   -strict I &O's  -check post void residual        DVT prophylaxis:  scds    CODE STATUS:  DNR/DNI, confirmed with Wife by phone        Expected Discharge    This note has been completed as part of a split-shared workflow.     Signature: Electronically signed by TATYANA Wheat, 07/21/25, 8:27 PM EDT.

## 2025-07-22 NOTE — PLAN OF CARE
Problem: Palliative Care  Goal: Enhanced Quality of Life  Outcome: Progressing   Goal Outcome Evaluation:           Progress: no change  Outcome Evaluation: new palliative care consult for GOC. Pt did not open his eyes to name or tactile stimuli. He was living at [facility] and having frequent falls.  Pt's right chest is brusied and swollen from hematoma.  pt has a furrowed brow and nursing reports discomfort with movement/activity/care. Tylenol prn.  Speech eval for pureed thin liquids.  Pt's wife is his POA. Paperwork faxed to HIM.  Pt is no cpr/intubation//no art nutrition/no dialysis. Agreeable to blood transfusion.  After hours# [phone]  Palliative IDT: Rn, MD TATYANA, ,

## 2025-07-22 NOTE — CONSULTS
I visited this patient to assess spiritual needs and offer appropriate support. The patient's wife requested assistance filing advance care planning documents, with which I assisted. I was unable to assess needs further due to other interventions taking place, so I will continue to follow. Please re-consult if requested before my return.

## 2025-07-22 NOTE — NURSING NOTE
"Wife called at 0546 to get consent for blood transfusion. She had multiple questions regarding blood borne pathogens. This RN explained that blood is screened for diseases there is a slight risk of exposure.     Patients wife stated \" he has so much irvin with him already, I don't want him to get anything else.\" She is going to call someone to get their their thoughts and stated \"I will call you right back.\"     Primary Rn and admitting provider notified.   "

## 2025-07-22 NOTE — CONSULTS
Palliative Care Initial Consult   Attending Physician: Jose Delgado MD  Referring Provider: Radha Yarbrough      Reason for Referral:  assistance with clarification of goals of care    Code Status:   Code Status and Medical Interventions: No CPR (Do Not Attempt to Resuscitate); Limited Support; No intubation (DNI)   Ordered at: 07/21/25 2030     Code Status (Patient has no pulse and is not breathing):    No CPR (Do Not Attempt to Resuscitate)     Medical Interventions (Patient has pulse or is breathing):    Limited Support     Medical Intervention Limits:    No intubation (DNI)     Level Of Support Discussed With:    Health Care Surrogate      Advanced Directives: Advance Directive Status: Patient does not have advance directive   Family/Support: spouse     Goals of Care:    Goals of Care/Treatment Preferences:    Treat what we can       HPI:   75 yo male to ED for eval of edema and frequent falls.  Most recent fall yesterday with R large pectoral hematoma.  H/H has trended down and now in need of PRBCs.  Additional eval significant for hypernatremia, decreased nutrition, anemia, hypothyroid, bacteruria and stercoral proctitis. Has underlying alzheimer's and has been in facility about 3 weeks.  Spouse had previously cared for at home but became too much.     ROS:   Pt did not answer any questions or vocalize during eval or with f/u with spouse      Past Medical History:   Diagnosis Date    Agitation     Alzheimer dementia with agitation     Anxiety     Anxiety     Aphasia     Atrial fibrillation     Atrial fibrillation     Dementia     Dysphagia     Gout     Gout     Hypertension     Hypothyroid     Pulmonary embolism    Psoriatic Arthritis    History reviewed. No pertinent surgical history.  Social History     Socioeconomic History    Marital status:    Substance and Sexual Activity    Drug use: Defer    Sexual activity: Defer     History reviewed. No pertinent family history.    Allergies   Allergen  Reactions    Cefdinir Other (See Comments)     Unknown    Sulfamethoxazole-Trimethoprim Other (See Comments)     Unknown         Current Facility-Administered Medications:     acetaminophen (TYLENOL) tablet 650 mg, 650 mg, Oral, Q4H PRN **OR** acetaminophen (TYLENOL) 160 MG/5ML oral solution 650 mg, 650 mg, Oral, Q4H PRN **OR** acetaminophen (TYLENOL) suppository 650 mg, 650 mg, Rectal, Q4H PRN, Kelly, Elizabeth, APRN    sennosides-docusate (PERICOLACE) 8.6-50 MG per tablet 2 tablet, 2 tablet, Oral, BID, 2 tablet at 07/22/25 1026 **AND** polyethylene glycol (MIRALAX) packet 17 g, 17 g, Oral, Daily PRN **AND** bisacodyl (DULCOLAX) EC tablet 5 mg, 5 mg, Oral, Daily PRN **AND** bisacodyl (DULCOLAX) suppository 10 mg, 10 mg, Rectal, Daily PRN, Kelly, Elizabeth, APRN    dextrose (D50W) (25 g/50 mL) IV injection 25 g, 25 g, Intravenous, Q15 Min PRN, Radha Yarbrough MD    dextrose (GLUTOSE) oral gel 15 g, 15 g, Oral, Q15 Min PRN, Radha Yarbrough MD    dextrose 5 % and sodium chloride 0.45 % infusion, 75 mL/hr, Intravenous, Continuous, Kelly, Elizabeth, APRN, Last Rate: 75 mL/hr at 07/22/25 0304, 75 mL/hr at 07/22/25 0304    glucagon (GLUCAGEN) injection 1 mg, 1 mg, Intramuscular, Q15 Min PRN, Radha Yarbrough MD    levothyroxine (SYNTHROID, LEVOTHROID) tablet 125 mcg, 125 mcg, Oral, Q AM, Kelly, Elizabeth, APRN    memantine (NAMENDA) tablet 10 mg, 10 mg, Oral, Q12H, Kelly, Elizabeth, APRN, 10 mg at 07/22/25 1026    mirtazapine (REMERON) tablet 15 mg, 15 mg, Oral, Nightly, Kelly, Elizabeth, APRN    nitroglycerin (NITROSTAT) SL tablet 0.4 mg, 0.4 mg, Sublingual, Q5 Min PRN, Kelly, Elizabeth, APRN    OLANZapine zydis (zyPREXA) disintegrating tablet 5 mg, 5 mg, Oral, Nightly, Kelly, Elizabeth, APRN    sodium chloride 0.9 % bolus 500 mL, 500 mL, Intravenous, Once, Radha Yarbrough MD, Stopped at 07/21/25 2140    [COMPLETED] Insert Peripheral IV, , , Once **AND** sodium chloride 0.9 % flush 10 mL, 10 mL, Intravenous, PRN,  "Clymer, Farheen V, APRN    [COMPLETED] Insert Peripheral IV, , , Once **AND** sodium chloride 0.9 % flush 10 mL, 10 mL, Intravenous, PRN, Clymer, Farheen V, APRN    sodium chloride 0.9 % flush 10 mL, 10 mL, Intravenous, Q12H, Kelly, Elizabeth, APRN, 10 mL at 07/22/25 1026    sodium chloride 0.9 % flush 10 mL, 10 mL, Intravenous, PRN, Kelly, Elizabeth, APRN    sodium chloride 0.9 % infusion 40 mL, 40 mL, Intravenous, PRN, Kelly, Elizabeth, APRN     Palliative Performance Scale Score: 10%    /59   Pulse 61   Temp 97.7 °F (36.5 °C) (Oral)   Resp 19   Ht 177.8 cm (70\")   Wt 63 kg (139 lb)   SpO2 99%   BMI 19.94 kg/m²     Intake/Output Summary (Last 24 hours) at 7/22/2025 1214  Last data filed at 7/21/2025 2359  Gross per 24 hour   Intake 502 ml   Output 300 ml   Net 202 ml       Physical Exam:    General Appearance:    Alert, cooperative, NAD   HEENT:    NC/AT, anicteric, MMM, face relaxed   Neck:   supple, trachea midline, no JVD   Lungs:     CTA bilat, diminished in bases; respirations regular, even     and unlabored    Heart:    RRR, normal S1 and S2, no M/R/G   Abdomen:     Normal bowel sounds, soft, nontender, nondistended   G/U:   Deferred   MSK/Extremities:   No clubbing , cyanosis, + edema   Pulses:   Pulses palpable and equal bilaterally   Skin:   Warm, dry, no mottling   Neurologic:   Answered no questions.  + withdrawal   Psych:   Unable to assess         Labs:   Results from last 7 days   Lab Units 07/22/25  0944 07/22/25  0313   WBC 10*3/mm3  --  5.68   HEMOGLOBIN g/dL 6.9* 6.8*   HEMATOCRIT % 22.1* 22.6*   PLATELETS 10*3/mm3  --  205     Results from last 7 days   Lab Units 07/22/25  0944 07/22/25  0313 07/21/25  1905 07/21/25  1432   SODIUM mmol/L 149* 149*   < > 152*   POTASSIUM mmol/L  --  3.2*   < > 3.7   CHLORIDE mmol/L  --  119*   < > 118*   CO2 mmol/L  --  24.0   < > 24.8   BUN mg/dL  --  12.0   < > 15.1   CREATININE mg/dL  --  0.74*   < > 0.81   CALCIUM mg/dL  --  7.9*   < > 8.7   BILIRUBIN " mg/dL  --   --   --  0.6   ALK PHOS U/L  --   --   --  61   ALT (SGPT) U/L  --   --   --  41   AST (SGOT) U/L  --   --   --  45*   GLUCOSE mg/dL  --  90   < > 97    < > = values in this interval not displayed.     Imaging Results (Last 72 Hours)       Procedure Component Value Units Date/Time    CT Chest Without Contrast Diagnostic [[id number]] Collected: 07/21/25 1536     Updated: 07/21/25 1551    Narrative:      CT CHEST WO CONTRAST DIAGNOSTIC, CT ABDOMEN PELVIS WO CONTRAST    Date of Exam: 7/21/2025 3:16 PM EDT    Indication: Anterior chest bruising, frequent falls.    Comparison: None available.    Technique: Axial CT images were obtained of the chest abdomen and pelvis without contrast administration.  Reconstructed coronal and sagittal images were also obtained. Automated exposure control and iterative construction methods were used.      Findings:  CT CHEST:  MEDIASTINUM: There is a moderate size sliding hiatal hernia. There are aortic valvular calcifications aortic and heart size are normal. No mass nor pericardial effusion.  CORONARY ARTERIES: There is calcified atherosclerotic disease.  LUNGS: There is minimal right basal airspace disease, likely atelectasis. No additional consolidation. No significant nodule nor interstitial changes.  PLEURAL SPACE: Trace right pleural effusion.  LYMPH NODES: No pathologically enlarged thoracic nodes.    There is a heterogeneous mass centered within the right pectoralis major muscle which is only partially visualized given field-of-view. This measures at least 15 cm in transverse dimension by 5 cm in AP dimension. This likely represents an intramuscular   hematoma. There is surrounding subcutaneous edema/ecchymosis. Correlate with history as neoplasm not excluded.    CT ABDOMEN AND PELVIS:   LIVER:  Unremarkable parenchyma without focal lesion.  BILIARY/GALLBLADDER:  Unremarkable  SPLEEN:  Unremarkable  PANCREAS:  Unremarkable  ADRENAL:  Unremarkable  KIDNEYS:   Unremarkable parenchyma with no solid mass identified. No obstruction.  No calculus identified.  GASTROINTESTINAL/MESENTERY: There is mural thickening with moderate fecal load involving the rectum. There is associated perirectal/presacral edema. 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 Correlate with history and symptoms.  AORTA/IVC: IVC filter is present on imaging study. It is recommended that all patients with IVC filters in place have an active management care plan to monitor their IVC filter. If a care plan is not in place, patient should have a non emergent referral   to an interventional specialist such as interventional radiology or other interventional vascular specialist for establishment of an IVC filter management care plan.    RETROPERITONEUM/LYMPH NODES:  Unremarkable    REPRODUCTIVE:  Unremarkable  BLADDER:  Unremarkable    OSSEUS STRUCTURES:  Typical for age with no acute process identified.    There is right anterior lateral abdominal wall subcutaneous edema versus ecchymosis.      Impression:      Impression:  1.There is a heterogeneous mass centered within the right pectoralis major muscle which is only partially visualized given field-of-view. This measures at least 15 cm in transverse dimension by 5 cm in AP dimension. This likely represents an   intramuscular hematoma. There is surrounding subcutaneous edema/ecchymosis. Correlate with history as neoplasm not excluded.  2.There is mural thickening with moderate fecal load involving the rectum. There is associated perirectal/presacral edema. 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 Correlate with history and symptoms.  3.There is right anterior lateral abdominal wall subcutaneous edema versus ecchymosis.  4.Trace right pleural effusion.  5.Moderate size sliding hiatal hernia.        Electronically Signed: Clark Espinoza MD    7/21/2025 3:48 PM EDT    Workstation ID: [id number]    CT Abdomen Pelvis Without Contrast [[id number]]  Collected: 07/21/25 1536     Updated: 07/21/25 1551    Narrative:      CT CHEST WO CONTRAST DIAGNOSTIC, CT ABDOMEN PELVIS WO CONTRAST    Date of Exam: 7/21/2025 3:16 PM EDT    Indication: Anterior chest bruising, frequent falls.    Comparison: None available.    Technique: Axial CT images were obtained of the chest abdomen and pelvis without contrast administration.  Reconstructed coronal and sagittal images were also obtained. Automated exposure control and iterative construction methods were used.      Findings:  CT CHEST:  MEDIASTINUM: There is a moderate size sliding hiatal hernia. There are aortic valvular calcifications aortic and heart size are normal. No mass nor pericardial effusion.  CORONARY ARTERIES: There is calcified atherosclerotic disease.  LUNGS: There is minimal right basal airspace disease, likely atelectasis. No additional consolidation. No significant nodule nor interstitial changes.  PLEURAL SPACE: Trace right pleural effusion.  LYMPH NODES: No pathologically enlarged thoracic nodes.    There is a heterogeneous mass centered within the right pectoralis major muscle which is only partially visualized given field-of-view. This measures at least 15 cm in transverse dimension by 5 cm in AP dimension. This likely represents an intramuscular   hematoma. There is surrounding subcutaneous edema/ecchymosis. Correlate with history as neoplasm not excluded.    CT ABDOMEN AND PELVIS:   LIVER:  Unremarkable parenchyma without focal lesion.  BILIARY/GALLBLADDER:  Unremarkable  SPLEEN:  Unremarkable  PANCREAS:  Unremarkable  ADRENAL:  Unremarkable  KIDNEYS:  Unremarkable parenchyma with no solid mass identified. No obstruction.  No calculus identified.  GASTROINTESTINAL/MESENTERY: There is mural thickening with moderate fecal load involving the rectum. There is associated perirectal/presacral edema. 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 Correlate with history and symptoms.  AORTA/IVC: IVC filter is  present on imaging study. It is recommended that all patients with IVC filters in place have an active management care plan to monitor their IVC filter. If a care plan is not in place, patient should have a non emergent referral   to an interventional specialist such as interventional radiology or other interventional vascular specialist for establishment of an IVC filter management care plan.    RETROPERITONEUM/LYMPH NODES:  Unremarkable    REPRODUCTIVE:  Unremarkable  BLADDER:  Unremarkable    OSSEUS STRUCTURES:  Typical for age with no acute process identified.    There is right anterior lateral abdominal wall subcutaneous edema versus ecchymosis.      Impression:      Impression:  1.There is a heterogeneous mass centered within the right pectoralis major muscle which is only partially visualized given field-of-view. This measures at least 15 cm in transverse dimension by 5 cm in AP dimension. This likely represents an   intramuscular hematoma. There is surrounding subcutaneous edema/ecchymosis. Correlate with history as neoplasm not excluded.  2.There is mural thickening with moderate fecal load involving the rectum. There is associated perirectal/presacral edema. 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 Correlate with history and symptoms.  3.There is right anterior lateral abdominal wall subcutaneous edema versus ecchymosis.  4.Trace right pleural effusion.  5.Moderate size sliding hiatal hernia.        Electronically Signed: Clark Espinoza MD    7/21/2025 3:48 PM EDT    Workstation ID: [id number]    CT Head Without Contrast [[id number]] Collected: 07/21/25 1533     Updated: 07/21/25 1548    Narrative:      CT HEAD WO CONTRAST    Date of Exam: 7/21/2025 3:16 PM EDT    Indication: frequent falls.    Comparison: 7/9/2025    Technique: Axial CT images were obtained of the head without contrast administration.  Automated exposure control and iterative construction methods were used.      Findings:  There is no  evidence of acute territorial infarction.    There is no acute intracranial hemorrhage. There are no extra-axial collections.    Ventricles and CSF spaces are symmetrically prominent. No mass effect nor hydrocephalus.    Brain parenchyma appears unchanged.     Paranasal sinuses and mastoid air cells are adequately aerated.     Osseous structures and orbits appear intact.      Impression:      Impression:  1.No acute intracranial process is identified.  2.Generalized atrophy.        Electronically Signed: Clark Espinoza MD    7/21/2025 3:36 PM EDT    Workstation ID: [id number]    XR Chest 1 View [[id number]] Collected: 07/21/25 1426     Updated: 07/21/25 1429    Narrative:      XR CHEST 1 VW    Date of Exam: 7/21/2025 2:10 PM EDT    Indication: Leg edema    Comparison: None available.    Findings:  Normal cardiomediastinal silhouette. The lungs are clear. No pleural effusion or pneumothorax. No acute osseous findings.      Impression:      Impression:  No acute cardiopulmonary findings.        Electronically Signed: Agusto Goodman MD    7/21/2025 2:26 PM EDT    Workstation ID: [id number]                Diagnostics:   No valid procedures specified.    A:     Hypernatremia    Alzheimer dementia    Hypocalcemia    Bacteriuria    Macrocytic anemia    Hypothyroidism (acquired)    History of pulmonary embolism         Impression:   R pectoral hematoma  Alzheimer's dementia with agitation  Stercoral proctitis  Hypernatremia  Afib  Gout hx  Hypoalbuminemia - 2.7  Hx psoriatic arthritis    Symptoms:  AMS  Debility       P:    Met with spouse in room.  Pt did not/unable to participate.  Reviewed recent events.  Agreed to PRBCs.  Reviewed code status and updated.  Would not want CPR, intubation, TF or HD.  Will continue to monitor for needs and support spouse through decisions. Thank you for this consult and allowing us to participate in patient's plan of care. Palliative Care Team will continue to follow patient.       Bijal  NIMO Hernandez MD, 7/22/2025, 12:14 EDT

## 2025-07-22 NOTE — PROGRESS NOTES
[facility]  PROGRESS NOTE    Patient Name: Maxim Henson  : 1950  MRN: [id number]    Date of Admission: 2025  Primary Care Physician: Angel Preston MD    Subjective   Subjective     CC: S/P Falls    HPI: Minimally responsive, combative to being examined      Objective   Objective     Vital Signs:   Temp:  [97.8 °F (36.6 °C)-99.1 °F (37.3 °C)] 97.8 °F (36.6 °C)  Heart Rate:  [61-96] 61  Resp:  [18] 18  BP: ()/(51-99) 96/51     Physical Exam:  NAD, nonverbal  OP dry  Neck supple  RRR  Scattered bruising  R chest hematoma noted  B LE edema pitting  EDWARD    Results Reviewed:  LAB RESULTS:      Lab 25  0313 25  0000 25  1628 25  1432   WBC 5.68  --   --  8.35   HEMOGLOBIN 6.8* 6.9*  --  8.4*   HEMATOCRIT 22.6* 22.3*  --  27.0*   PLATELETS 205  --   --  244   NEUTROS ABS 4.08  --   --  6.68   IMMATURE GRANS (ABS) 0.02  --   --  0.04   LYMPHS ABS 0.72  --   --  0.68*   MONOS ABS 0.74  --   --  0.85   EOS ABS 0.09  --   --  0.05   .7*  --   --  102.3*   CRP  --   --   --  6.42*   PROCALCITONIN  --   --   --  0.12   LACTATE  --   --   --  1.2   HSTROP T  --   --  115* 142*         Lab 25  0313 25  0148 25  2212 25  1905 25  1432   SODIUM 149* 149* 149* 149* 152*   POTASSIUM 3.2*  --   --  3.5 3.7   CHLORIDE 119*  --   --  117* 118*   CO2 24.0  --   --  26.0 24.8   ANION GAP 6.0  --   --  6.0 9.2   BUN 12.0  --   --  14.4 15.1   CREATININE 0.74*  --   --  0.76 0.81   EGFR 93.9  --   --  93.2 91.4   GLUCOSE 90  --   --  96 97   CALCIUM 7.9*  --   --  8.0* 8.7   MAGNESIUM  --   --   --   --  2.5*   TSH  --   --   --   --  17.600*         Lab 25  1432   TOTAL PROTEIN 5.2*   ALBUMIN 2.7*   GLOBULIN 2.5   ALT (SGPT) 41   AST (SGOT) 45*   BILIRUBIN 0.6   ALK PHOS 61         Lab 25  1628 25  1432   PROBNP  --  345.0   HSTROP T 115* 142*             Lab 25  2059 25  1905   IRON  17* 14*   IRON SATURATION (TSAT)  --  7*   TIBC  --  188*   TRANSFERRIN  --  126*   FERRITIN  --  248.00   FOLATE 4.19*  --    VITAMIN B 12 1,385*  --    ABO TYPING O  --    RH TYPING Positive  --    ANTIBODY SCREEN Negative  --          Brief Urine Lab Results  (Last result in the past 365 days)        Color   Clarity   Blood   Leuk Est   Nitrite   Protein   CREAT   Urine HCG        07/21/25 1439 Yellow   Clear   Negative   Trace   Negative   Trace                   Microbiology Results Abnormal       None            CT Chest Without Contrast Diagnostic  Result Date: 7/21/2025  CT CHEST WO CONTRAST DIAGNOSTIC, CT ABDOMEN PELVIS WO CONTRAST Date of Exam: 7/21/2025 3:16 PM EDT Indication: Anterior chest bruising, frequent falls. Comparison: None available. Technique: Axial CT images were obtained of the chest abdomen and pelvis without contrast administration.  Reconstructed coronal and sagittal images were also obtained. Automated exposure control and iterative construction methods were used. Findings: CT CHEST: MEDIASTINUM: There is a moderate size sliding hiatal hernia. There are aortic valvular calcifications aortic and heart size are normal. No mass nor pericardial effusion. CORONARY ARTERIES: There is calcified atherosclerotic disease. LUNGS: There is minimal right basal airspace disease, likely atelectasis. No additional consolidation. No significant nodule nor interstitial changes. PLEURAL SPACE: Trace right pleural effusion. LYMPH NODES: No pathologically enlarged thoracic nodes. There is a heterogeneous mass centered within the right pectoralis major muscle which is only partially visualized given field-of-view. This measures at least 15 cm in transverse dimension by 5 cm in AP dimension. This likely represents an intramuscular hematoma. There is surrounding subcutaneous edema/ecchymosis. Correlate with history as neoplasm not excluded. CT ABDOMEN AND PELVIS:  LIVER:  Unremarkable parenchyma without focal  lesion. BILIARY/GALLBLADDER:  Unremarkable SPLEEN:  Unremarkable PANCREAS:  Unremarkable ADRENAL:  Unremarkable KIDNEYS:  Unremarkable parenchyma with no solid mass identified. No obstruction.  No calculus identified. GASTROINTESTINAL/MESENTERY: There is mural thickening with moderate fecal load involving the rectum. There is associated perirectal/presacral edema. 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 Correlate with history and symptoms. AORTA/IVC: IVC filter is present on imaging study. It is recommended that all patients with IVC filters in place have an active management care plan to monitor their IVC filter. If a care plan is not in place, patient should have a non emergent referral to an interventional specialist such as interventional radiology or other interventional vascular specialist for establishment of an IVC filter management care plan. RETROPERITONEUM/LYMPH NODES:  Unremarkable REPRODUCTIVE:  Unremarkable BLADDER:  Unremarkable OSSEUS STRUCTURES:  Typical for age with no acute process identified. There is right anterior lateral abdominal wall subcutaneous edema versus ecchymosis.     Impression: Impression: 1.There is a heterogeneous mass centered within the right pectoralis major muscle which is only partially visualized given field-of-view. This measures at least 15 cm in transverse dimension by 5 cm in AP dimension. This likely represents an intramuscular hematoma. There is surrounding subcutaneous edema/ecchymosis. Correlate with history as neoplasm not excluded. 2.There is mural thickening with moderate fecal load involving the rectum. There is associated perirectal/presacral edema. 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 Correlate with history and symptoms. 3.There is right anterior lateral abdominal wall subcutaneous edema versus ecchymosis. 4.Trace right pleural effusion. 5.Moderate size sliding hiatal hernia. Electronically Signed: Clark Espinoza MD  7/21/2025 3:48 PM EDT   Workstation ID: [id number]    CT Abdomen Pelvis Without Contrast  Result Date: 7/21/2025  CT CHEST WO CONTRAST DIAGNOSTIC, CT ABDOMEN PELVIS WO CONTRAST Date of Exam: 7/21/2025 3:16 PM EDT Indication: Anterior chest bruising, frequent falls. Comparison: None available. Technique: Axial CT images were obtained of the chest abdomen and pelvis without contrast administration.  Reconstructed coronal and sagittal images were also obtained. Automated exposure control and iterative construction methods were used. Findings: CT CHEST: MEDIASTINUM: There is a moderate size sliding hiatal hernia. There are aortic valvular calcifications aortic and heart size are normal. No mass nor pericardial effusion. CORONARY ARTERIES: There is calcified atherosclerotic disease. LUNGS: There is minimal right basal airspace disease, likely atelectasis. No additional consolidation. No significant nodule nor interstitial changes. PLEURAL SPACE: Trace right pleural effusion. LYMPH NODES: No pathologically enlarged thoracic nodes. There is a heterogeneous mass centered within the right pectoralis major muscle which is only partially visualized given field-of-view. This measures at least 15 cm in transverse dimension by 5 cm in AP dimension. This likely represents an intramuscular hematoma. There is surrounding subcutaneous edema/ecchymosis. Correlate with history as neoplasm not excluded. CT ABDOMEN AND PELVIS:  LIVER:  Unremarkable parenchyma without focal lesion. BILIARY/GALLBLADDER:  Unremarkable SPLEEN:  Unremarkable PANCREAS:  Unremarkable ADRENAL:  Unremarkable KIDNEYS:  Unremarkable parenchyma with no solid mass identified. No obstruction.  No calculus identified. GASTROINTESTINAL/MESENTERY: There is mural thickening with moderate fecal load involving the rectum. There is associated perirectal/presacral edema. 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 Correlate with history and symptoms. AORTA/IVC: IVC filter is present on imaging  study. It is recommended that all patients with IVC filters in place have an active management care plan to monitor their IVC filter. If a care plan is not in place, patient should have a non emergent referral to an interventional specialist such as interventional radiology or other interventional vascular specialist for establishment of an IVC filter management care plan. RETROPERITONEUM/LYMPH NODES:  Unremarkable REPRODUCTIVE:  Unremarkable BLADDER:  Unremarkable OSSEUS STRUCTURES:  Typical for age with no acute process identified. There is right anterior lateral abdominal wall subcutaneous edema versus ecchymosis.     Impression: Impression: 1.There is a heterogeneous mass centered within the right pectoralis major muscle which is only partially visualized given field-of-view. This measures at least 15 cm in transverse dimension by 5 cm in AP dimension. This likely represents an intramuscular hematoma. There is surrounding subcutaneous edema/ecchymosis. Correlate with history as neoplasm not excluded. 2.There is mural thickening with moderate fecal load involving the rectum. There is associated perirectal/presacral edema. 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 Correlate with history and symptoms. 3.There is right anterior lateral abdominal wall subcutaneous edema versus ecchymosis. 4.Trace right pleural effusion. 5.Moderate size sliding hiatal hernia. Electronically Signed: Clark Espinoza MD  7/21/2025 3:48 PM EDT  Workstation ID: [id number]    CT Head Without Contrast  Result Date: 7/21/2025  CT HEAD WO CONTRAST Date of Exam: 7/21/2025 3:16 PM EDT Indication: frequent falls. Comparison: 7/9/2025 Technique: Axial CT images were obtained of the head without contrast administration.  Automated exposure control and iterative construction methods were used. Findings: There is no evidence of acute territorial infarction. There is no acute intracranial hemorrhage. There are no extra-axial collections. Ventricles  and CSF spaces are symmetrically prominent. No mass effect nor hydrocephalus. Brain parenchyma appears unchanged.  Paranasal sinuses and mastoid air cells are adequately aerated.  Osseous structures and orbits appear intact.     Impression: Impression: 1.No acute intracranial process is identified. 2.Generalized atrophy. Electronically Signed: Clark Espinoza MD  7/21/2025 3:36 PM EDT  Workstation ID: [id number]    XR Chest 1 View  Result Date: 7/21/2025  XR CHEST 1 VW Date of Exam: 7/21/2025 2:10 PM EDT Indication: Leg edema Comparison: None available. Findings: Normal cardiomediastinal silhouette. The lungs are clear. No pleural effusion or pneumothorax. No acute osseous findings.     Impression: Impression: No acute cardiopulmonary findings. Electronically Signed: Agusto Goodman MD  7/21/2025 2:26 PM EDT  Workstation ID: [id number]          Current medications:  Scheduled Meds:levothyroxine, 125 mcg, Oral, Q AM  memantine, 10 mg, Oral, Q12H  mirtazapine, 15 mg, Oral, Nightly  OLANZapine zydis, 5 mg, Oral, Nightly  senna-docusate sodium, 2 tablet, Oral, BID  sodium chloride, 500 mL, Intravenous, Once  sodium chloride, 10 mL, Intravenous, Q12H      Continuous Infusions:dextrose 5 % and sodium chloride 0.45 %, 75 mL/hr, Last Rate: 75 mL/hr (07/22/25 0304)      PRN Meds:.•  acetaminophen **OR** acetaminophen **OR** acetaminophen  •  senna-docusate sodium **AND** polyethylene glycol **AND** bisacodyl **AND** bisacodyl  •  dextrose  •  dextrose  •  glucagon (human recombinant)  •  nitroglycerin  •  [COMPLETED] Insert Peripheral IV **AND** sodium chloride  •  [COMPLETED] Insert Peripheral IV **AND** sodium chloride  •  sodium chloride  •  sodium chloride    Assessment & Plan   Assessment & Plan     Active Hospital Problems    Diagnosis  POA   • **Hypernatremia [E87.0]  Yes   • Alzheimer dementia [G30.9, F02.80]  Yes   • Hypocalcemia [E83.51]  Yes   • Bacteriuria [R82.71]  Yes   • Macrocytic anemia [D53.9]  Yes   •  Hypothyroidism (acquired) [E03.9]  Yes   • History of pulmonary embolism [Z86.711]  Unknown      Resolved Hospital Problems   No resolved problems to display.        Brief Hospital Course to date:  Maxim Henson is a 74 y.o. male with history of dementia, aphasia, hypothyroidism, prior PE/DVT here s/p falls    Hypernatremia  -volume depletion    Falls  -with bruising  -R pectoralis muscle hematoma  -weakness, frequent hypotension noted on prior cardiology/neurology notes  -likely should DC further AC use, d/w family  -PT/OT    Anemia  -PRBC x 2 ordered, family has not decided to transfuse yet  -possibly due to falls/hematomas  -monitor GI output given below    Stercoral proctitis  -bowel regimen    Advanced Alzheimer's  -progressive aphasia per neurology notes  -vascular Parkinsonian features as well  -fall risk  -continue home meds as able    Hypothyroidism  -synthroid    Bacteruria  -urine cultures    Palliative care consult/GOC discussion  Will d/w family    Hx of PE/DVT  -hold xarelto    Attempted to contact POA/family at [phone]    Expected Discharge Location and Transportation: TBD  Expected Discharge TBD  Expected discharge date/ time has not been documented.     VTE Prophylaxis:  Pharmacologic & mechanical VTE prophylaxis orders are present.         AM-PAC 6 Clicks Score (PT): 6 (07/21/25 0725)    CODE STATUS:   Code Status and Medical Interventions: No CPR (Do Not Attempt to Resuscitate); Limited Support; No intubation (DNI)   Ordered at: 07/21/25 2030     Code Status (Patient has no pulse and is not breathing):    No CPR (Do Not Attempt to Resuscitate)     Medical Interventions (Patient has pulse or is breathing):    Limited Support     Medical Intervention Limits:    No intubation (DNI)     Level Of Support Discussed With:    Health Care Surrogate       Jose Delgado MD  07/22/25

## 2025-07-22 NOTE — NURSING NOTE
Call placed to wife to get consent for blood transfusion. Voicemail left requesting call back.    [id number] 2nd call placed to patients wife to get consent for blood. Call sent to voicemail.  left stating reason for call and call back requested.     On call provider notified.      [id number] Home phone called. Voicemail sent.

## 2025-07-22 NOTE — THERAPY EVALUATION
Acute Care - Speech Language Pathology   Swallow Initial Evaluation [facility]    Clinical Swallow Evaluation  Cognitive-Communication Evaluation       Patient Name: Maxim Henson  : 1950  MRN: [id number]  Today's Date: 2025               Admit Date: 2025    Visit Dx:     ICD-10-CM ICD-9-CM   1. Anemia, unspecified type  D64.9 285.9   2. Leg edema  R60.0 782.3   3. Hematoma of right chest wall, initial encounter  S20.211A 922.1   4. Proctitis  K62.89 569.49   5. Pleural effusion  J90 511.9   6. Frequent falls  R29.6 V15.88   7. Oral phase dysphagia  R13.11 787.21   8. Cognitive communication deficit  R41.841 799.52     Patient Active Problem List   Diagnosis    Hypernatremia    Alzheimer dementia    Hypocalcemia    Bacteriuria    Macrocytic anemia    Hypothyroidism (acquired)    History of pulmonary embolism     Past Medical History:   Diagnosis Date    Agitation     Alzheimer dementia with agitation     Anxiety     Anxiety     Aphasia     Atrial fibrillation     Atrial fibrillation     Dementia     Dysphagia     Gout     Gout     Hypertension     Hypothyroid     Pulmonary embolism      History reviewed. No pertinent surgical history.    SLP Recommendation and Plan  SLP Swallowing Diagnosis: mild, oral dysphagia, R/O pharyngeal dysphagia (25)  SLP Diet Recommendation: puree, thin liquids (25)  Recommended Precautions and Strategies: upright posture during/after eating, general aspiration precautions (25)  SLP Rec. for Method of Medication Administration: meds crushed, with puree (25)     Monitor for Signs of Aspiration: notify SLP if any concerns (25)  Recommended Diagnostics: other (see comments) (diet tolerance) (25)  Swallow Criteria for Skilled Therapeutic Interventions Met: demonstrates skilled criteria (25)  Anticipated Discharge Disposition (SLP): skilled nursing facility (25)  Rehab Potential/Prognosis,  Swallowing: re-evaluate goals as necessary (07/22/25 0930)  Therapy Frequency (Swallow): PRN, 5 days per week (07/22/25 0930)  Predicted Duration Therapy Intervention (Days): 1 week (07/22/25 0930)  Oral Care Recommendations: Oral Care BID/PRN, Toothbrush (07/22/25 0930)  Demonstrates Need for Referral to Another Service: palliative care services (07/22/25 0930)                                            SWALLOW EVALUATION (Last 72 Hours)       SLP Adult Swallow Evaluation       Row Name 07/22/25 0930                   General Information    Current Method of Nutrition NPO  -CH        Prior Level of Function-Communication cognitive-linguistic impairment;other (see comments)  alzheimers dementia at baseline  -CH        Prior Level of Function-Swallowing mechanical ground textures;thin liquids  -CH           Oral Motor Structure and Function    Dentition Assessment natural, present and adequate  -CH        Secretion Management WNL/WFL  -CH        Mucosal Quality dry  -CH           General Eating/Swallowing Observations    Respiratory Support Currently in Use room air  -        Eating/Swallowing Skills fed by SLP;unable to perform self-feeding;unaware of safety concerns  -        Positioning During Eating upright 90 degree;upright in bed  -        Utensils Used spoon;cup;straw  -        Consistencies Trialed ice chips;thin liquids;pureed  -CH           Respiratory    Respiratory Status WFL;room air  -CH           Clinical Swallow Eval    Oral Prep Phase impaired  -CH        Oral Transit WFL  -CH        Oral Residue WFL  -CH        Pharyngeal Phase no overt signs/symptoms of pharyngeal impairment  -CH        Esophageal Phase unremarkable  -        Clinical Swallow Evaluation Summary No overt clinical s/s of aspiration, however, patient is at general risk for aspiration given his cognitive status.  -CH           Oral Prep Concerns    Oral Prep Concerns incomplete or weak lip closure around spoon;increased prep  time  -CH        Incomplete or Weak Lip Closure Around Spoon pudding  -        Increased Prep Time pudding  -           Swallowing Quality of Life Assessment    Education and counseling provided Risks of aspiration;Oral care recommendations and rationale  -           SLP Evaluation Clinical Impression    SLP Swallowing Diagnosis mild;oral dysphagia;R/O pharyngeal dysphagia  -        Functional Impact risk of aspiration/pneumonia  -        Rehab Potential/Prognosis, Swallowing re-evaluate goals as necessary  -        Swallow Criteria for Skilled Therapeutic Interventions Met demonstrates skilled criteria  -           Recommendations    Therapy Frequency (Swallow) PRN;5 days per week  -        SLP Diet Recommendation puree;thin liquids  -        Recommended Diagnostics other (see comments)  diet tolerance  -        Recommended Precautions and Strategies upright posture during/after eating;general aspiration precautions  -        Oral Care Recommendations Oral Care BID/PRN;Toothbrush  -        SLP Rec. for Method of Medication Administration meds crushed;with puree  -        Monitor for Signs of Aspiration notify SLP if any concerns  -                  User Key  (r) = Recorded By, (t) = Taken By, (c) = Cosigned By      Initials Name Effective Dates     Anusha Hernandez, MS CCC-SLP 01/20/25 -                     EDUCATION  The patient has been educated in the following areas:   Dysphagia (Swallowing Impairment) Oral Care/Hydration.        SLP GOALS       Row Name 07/22/25 0930             (LTG) Patient will demonstrate functional swallow for    Diet Texture (Demonstrate functional swallow) soft to chew (chopped) textures  -      Liquid viscosity (Demonstrate functional swallow) thin liquids  -      [place] (Demonstrate functional swallow) with minimal cues (75-90% accuracy)  -      Time Frame (Demonstrate functional swallow) by discharge;1 week  -      Barriers (Demonstrate  functional swallow) cognitive status  -         (STG) Patient will tolerate trials of    Consistencies Trialed (Tolerate trials) mechanical ground textures;thin liquids  -      Desired Outcome (Tolerate trials) without signs/symptoms of aspiration;with adequate oral prep/transit/clearance  -      [place] (Tolerate trials) with minimal cues (75-90% accuracy)  -      Time Frame (Tolerate trials) 1 week  -         Patient will demonstrate functional communication skills for return to discharge environment     [place] with maximum cues  -CH      Time frame 1 week  -CH         SLP Diagnostic Treatment     Patient will participate in further assessment in the following areas clarification of baseline cognitive communication status  -CH      Time Frame (Diagnostic) 1 week  -CH      Barriers (Diagnostic) Cognitive status;Baseline deficits  -         Follow Directions Goal 2 (SLP)    Improve Ability to Follow Directions Goal 1 (SLP) 1 step direction without objects;50%;with maximum cues (25-49%)  -      Time Frame (Follow Directions Goal 1, SLP) 1 week  -CH         Attention Goal 1 (SLP)    Improve Attention by Goal 1 (SLP) looking at speaker;attending to task;50%;with 1:1 supervision/constant cues  -CH      Time Frame (Attention Goal 1, SLP) 1 week  -         Pragmatic Skills Goal 1 (SLP)    Improve Pragmatic Skills Goal 1 (SLP) maintain eye contact;50%;with 1:1 supervision/constant cues  -CH      Time Frame (Pragmatic Skills Goal 1, SLP) 1 week  -                User Key  (r) = Recorded By, (t) = Taken By, (c) = Cosigned By      Initials Name Provider Type    Anusha Clark MS CCC-SLP Speech and Language Pathologist                         Time Calculation:    Time Calculation- SLP       Row Name 07/22/25 1495             Time Calculation- SLP    SLP Start Time 0930  -      SLP Received On 07/22/25  -         Untimed Charges    SLP Eval/Re-eval  ST Eval Speech and Production w/  Language - 31125;ST Eval Oral Pharyng Swallow - 28632  -      93099-LE Eval Speech and Production w/ Language Minutes 23  -CH      99579-QG Eval Oral Pharyng Swallow Minutes 40  -CH         Total Minutes    Untimed Charges Total Minutes 63  -CH       Total Minutes 63  -CH                User Key  (r) = Recorded By, (t) = Taken By, (c) = Cosigned By      Initials Name Provider Type     Anusha Hernandez, MS CCC-SLP Speech and Language Pathologist                    Therapy Charges for Today       Code Description Service Date Service Provider Modifiers Qty    64827627229 HC ST EVAL ORAL PHARYNG SWALLOW 3 2025 Anusha Hernandez, MS CCC-SLP GN 1    24453244900 HC ST EVAL SPEECH AND PROD W LANG  2 2025 Anusha Hernandez MS CCC-SLP GN 1                 Anusha Hernandez MS CCC-SLP  2025   and Acute Care - Speech Language Pathology Initial Evaluation  [facility]     Patient Name: Maxim Henson  : 1950  MRN: [id number]  Today's Date: 2025               Admit Date: 2025     Visit Dx:    ICD-10-CM ICD-9-CM   1. Anemia, unspecified type  D64.9 285.9   2. Leg edema  R60.0 782.3   3. Hematoma of right chest wall, initial encounter  S20.211A 922.1   4. Proctitis  K62.89 569.49   5. Pleural effusion  J90 511.9   6. Frequent falls  R29.6 V15.88   7. Oral phase dysphagia  R13.11 787.21   8. Cognitive communication deficit  R41.841 799.52     Patient Active Problem List   Diagnosis    Hypernatremia    Alzheimer dementia    Hypocalcemia    Bacteriuria    Macrocytic anemia    Hypothyroidism (acquired)    History of pulmonary embolism     Past Medical History:   Diagnosis Date    Agitation     Alzheimer dementia with agitation     Anxiety     Anxiety     Aphasia     Atrial fibrillation     Atrial fibrillation     Dementia     Dysphagia     Gout     Gout     Hypertension     Hypothyroid     Pulmonary embolism      History reviewed. No pertinent surgical history.    SLP Recommendation and Plan  SLP  Diagnosis: profound, cognitive-linguistic disorder, communication disorder (07/22/25 0930)        Monitor for Signs of Aspiration: notify SLP if any concerns (07/22/25 0930)  Swallow Criteria for Skilled Therapeutic Interventions Met: demonstrates skilled criteria (07/22/25 0930)  SLC Criteria for Skilled Therapy Interventions Met: yes (07/22/25 0930)  Anticipated Discharge Disposition (SLP): skilled nursing facility (07/22/25 0930)  Demonstrates Need for Referral to Another Service: palliative care services (07/22/25 0930)  Therapy Frequency (Swallow): PRN, 5 days per week (07/22/25 0930)  Therapy Frequency (SLP SLC): 3 days per week (07/22/25 0930)  Predicted Duration Therapy Intervention (Days): 1 week (07/22/25 0930)  Oral Care Recommendations: Oral Care BID/PRN, Toothbrush (07/22/25 0930)                                 SLP EVALUATION (Last 72 Hours)       SLP SLC Evaluation       Row Name 07/22/25 0930                   Communication Assessment/Intervention    Document Type evaluation  -CH        Patient Observations alert  -        Patient/Family/Caregiver Comments/Observations none present  -CH        Patient Effort fair  -CH        Symptoms Noted During/After Treatment none  -CH        Oral Care lip/mouth moisturizer applied  -           General Information    Patient Profile Reviewed yes  -CH        Pertinent History Of Current Problem history of dementia, aphasia, hypothyroidism, prior PE/DVT here s/p falls.  -CH        Precautions/Limitations, Vision difficult to assess  -CH        Precautions/Limitations, Hearing difficult to assess  -CH        Prior Level of Function-Communication unknown;other (see comments)  dementia at baseline  -CH        Plans/Goals Discussed with patient  -        Barriers to Rehab cognitive status  -        Patient's Goals for Discharge patient could not state  -        Family Goals for Discharge other (see comments)  no family present  -           Pain    Additional  "Documentation Pain Scale: FACES Pre/Post-Treatment (Group)  -CH           Pain Scale: FACES Pre/Post-Treatment    Pain: FACES Scale, Pretreatment 0-->no hurt  -CH        Posttreatment Pain Rating 0-->no hurt  -CH           Comprehension Assessment/Intervention    Comprehension Assessment/Intervention Auditory Comprehension;Reading Comprehension  -CH           Auditory Comprehension Assessment/Intervention    Auditory Comprehension (Communication) unable/difficult to assess  -CH        Able to Identify Objects/Pictures (Communication) unable/difficult to assess  -CH        Answers Questions (Communication) unable/difficult to assess  -        Able to Follow Commands (Communication) 1-step;severe impairment;other (see comments)  patient only followed 2 single step commands to open mouth with tactile cues, and to say \"ahh\" when assessing vocal quality  -        Narrative Discourse unable/difficult to assess  -           Reading Comprehension Assessment/Intervention    Reading Comprehension (Communication) unable/difficult to assess  -           Expression Assessment/Intervention    Expression Assessment/Intervention verbal expression  -           Verbal Expression Assessment/Intervention    Verbal Expression severe impairment;other (see comments)  minimal verbal responses illicited  -        Automatic Speech (Communication) response to greeting  -        Repetition sounds;severe impairment  -        Phrase Completion unable/difficult to assess  -        Responsive Naming unable/difficult to assess  -        Confrontational Naming unable/difficult to assess  -        Spontaneous/Functional Words simple;severe impairment  -        Verbal Expression, Comment [place] verbal response to \"thank you\" and pt responded \"welcome\" .  He followed command to open mouth and repeated \"ahh\"  after SLP  -CH           Oral Motor Structure and Function    Oral Motor Structure and Function unable/difficult to assess  " -CH           Oral Musculature and Cranial Nerve Assessment    Oral Motor General Assessment unable to assess  -CH           Motor Speech Assessment/Intervention    Motor Speech Function unable/difficult to assess  -CH           Cursory Voice Assessment/Intervention    Quality and Resonance (Voice) unable/difficult to assess  -CH           Cognitive Assessment Intervention- SLP    Cognitive Function (Cognition) unable/difficult to assess  -CH           SLP Evaluation Clinical Impressions    SLP Diagnosis profound;cognitive-linguistic disorder;communication disorder  -        Rehab Potential/Prognosis guarded  -        SLC Criteria for Skilled Therapy Interventions Met yes  -        Functional Impact difficulty communicating wants, needs;unable to make medical decisions;needs 24 hour supervision  -           Recommendations    Therapy Frequency (SLP SLC) 3 days per week  -CH        Predicted Duration Therapy Intervention (Days) 1 week  -CH        Anticipated Discharge Disposition (SLP) skilled nursing facility  -        Demonstrates Need for Referral to Another Service palliative care services  -                  User Key  (r) = Recorded By, (t) = Taken By, (c) = Cosigned By      Initials Name Effective Dates     Anusha Hernandez, MS CCC-SLP 01/20/25 -                        EDUCATION  The patient has been educated in the following areas:     Cognitive Impairment Communication Impairment.           SLP GOALS       Row Name 07/22/25 0901             (LTG) Patient will demonstrate functional swallow for    Diet Texture (Demonstrate functional swallow) soft to chew (chopped) textures  -      Liquid viscosity (Demonstrate functional swallow) thin liquids  -      [place] (Demonstrate functional swallow) with minimal cues (75-90% accuracy)  -CH      Time Frame (Demonstrate functional swallow) by discharge;1 week  -CH      Barriers (Demonstrate functional swallow) cognitive status  -         (STG)  Patient will tolerate trials of    Consistencies Trialed (Tolerate trials) mechanical ground textures;thin liquids  -      Desired Outcome (Tolerate trials) without signs/symptoms of aspiration;with adequate oral prep/transit/clearance  -      [place] (Tolerate trials) with minimal cues (75-90% accuracy)  -      Time Frame (Tolerate trials) 1 week  -         Patient will demonstrate functional communication skills for return to discharge environment     [place] with maximum cues  -      Time frame 1 week  -CH         SLP Diagnostic Treatment     Patient will participate in further assessment in the following areas clarification of baseline cognitive communication status  -      Time Frame (Diagnostic) 1 week  -CH      Barriers (Diagnostic) Cognitive status;Baseline deficits  -         Follow Directions Goal 2 (SLP)    Improve Ability to Follow Directions Goal 1 (SLP) 1 step direction without objects;50%;with maximum cues (25-49%)  -      Time Frame (Follow Directions Goal 1, SLP) 1 week  -         Attention Goal 1 (SLP)    Improve Attention by Goal 1 (SLP) looking at speaker;attending to task;50%;with 1:1 supervision/constant cues  -CH      Time Frame (Attention Goal 1, SLP) 1 week  -         Pragmatic Skills Goal 1 (SLP)    Improve Pragmatic Skills Goal 1 (SLP) maintain eye contact;50%;with 1:1 supervision/constant cues  -CH      Time Frame (Pragmatic Skills Goal 1, SLP) 1 week  -                User Key  (r) = Recorded By, (t) = Taken By, (c) = Cosigned By      Initials Name Provider Type     Anusha Hernandez MS CCC-SLP Speech and Language Pathologist                              Time Calculation:      Time Calculation- SLP       Row Name 07/22/25 1555             Time Calculation- SLP    SLP Start Time 0930  -      SLP Received On 07/22/25  -         Untimed Charges    SLP Eval/Re-eval  ST Eval Speech and Production w/ Language - 05705;ST Eval Oral Pharyng Swallow - 71199   -      73439-AS Eval Speech and Production w/ Language Minutes 23  -CH      16773-MH Eval Oral Pharyng Swallow Minutes 40  -CH         Total Minutes    Untimed Charges Total Minutes 63  -CH       Total Minutes 63  -CH                User Key  (r) = Recorded By, (t) = Taken By, (c) = Cosigned By      Initials Name Provider Type    Anusha Clark MS CCC-SLP Speech and Language Pathologist                    Therapy Charges for Today       Code Description Service Date Service Provider Modifiers Qty    95896895256 HC ST EVAL ORAL PHARYNG SWALLOW 3 7/22/2025 Anusha Hernandez MS CCC-SLP GN 1    [id number] HC ST EVAL SPEECH AND PROD W LANG  2 7/22/2025 Anusha Hernandez MS CCC-SLP GN 1                       Anusha Hernandez MS CCC-SLP  7/22/2025

## 2025-07-22 NOTE — PLAN OF CARE
Goal Outcome Evaluation:  Plan of Care Reviewed With: patient                Anticipated Discharge Disposition (SLP): skilled nursing facility    SLP Diagnosis: profound, cognitive-linguistic disorder, communication disorder (07/22/25 0930)     SLP Swallowing Diagnosis: mild, oral dysphagia, R/O pharyngeal dysphagia (07/22/25 0930)

## 2025-07-22 NOTE — CASE MANAGEMENT/SOCIAL WORK
Continued Stay Note  [facility]     Patient Name: Maxim Henson  MRN: [id number]  Today's Date: 7/22/2025    Admit Date: 7/21/2025    Plan: TBD   Discharge Plan       Row Name 07/22/25 1536       Plan    Plan TBD    Plan Comments Unable to complete IDP. Pt resting in bed with eyes closed with no family at bedside. Attempt made to contact spouse via phone unsuccessful. Palliative consulted. CM will cont to follow                   Discharge Codes    No documentation.                       Elisa Berry RN

## 2025-07-23 NOTE — NURSING NOTE
Woc consulted to for pressure injury to coccyx and sores on penis.     Per nursing the penis kind of bleeds a little bit however today it has been controlled and nonbleeding there is lots of redness most likely redness due to sweat and contractured legs patient's legs are completely locked together and has pressure injuries on the inside of his knees.    I did not assess this over the backside due to family in the room and crying considering hospice.    Per the RN today his backside is just red is charted red and nonblanchable stated no open areas so this if its red nonblanchable it is a stage I which does not constitute a Woc consult.  [facility] policy states please offload and reassess in 24 hours.    Same with the moisture related skin breakdown it is nursing handled my recommendation is cleansing with pH no rinse foam and blue wipes trying to maybe lightly dust some cornstarch baby powder in between the legs lightly dust so there is no clumping wrapped some Xeroform around the scabbed areas on the penis maybe even an ABD pad to try to absorb some of that moisture    As far as the knees go the pillow is in place and silicone foam was placed on them changing them today the scab came off the right medial knee revealing a stage II pressure injury this can just be treated with the silicone foam however on the left medial knee it is unstageable so I did cut a square of Thera honey sheet to put in there this can be changed every 3 days.  MSV noted that when using silicone foam with the patient's skin we need to use the adhesive barrier spray that I provided in the room to remove the dressings we will also clean each wound when we change the dressings every 3 days with a 5-minute Vashe soaked 4 x 4 to prevent infection.    [facility]

## 2025-07-23 NOTE — SIGNIFICANT NOTE
Called per nursing secondary to increased dyspnea and concern for increasing hematoma. Labs obtained. H&H stable. CXR obtained, stable. Pt now weaning back to room air.

## 2025-07-23 NOTE — SIGNIFICANT NOTE
RRT for hypoxia    Patient desatting on 6L, does have rhonchi lower lobes.Fever 101 earlier.  Diet was restarted today. CXR showing atelectasis lower lobes. Labs obtained and nonrebreather placed. ABG with hypoxemia. BSG 88. Nonrebreather mask initially placed however patient began to desat so placed on bipap for time being. Suspecting aspiration, may need CT vs CTA chest pending d dimer however patient not yet stable to move.     Discussed with wife regarding events. Confirms DNR DNI    Will see if we can wean bipap and attempt CTA.  Start empiric zozyn  Bronchopulmonary hygiene    Maxim Zaragoza MD

## 2025-07-23 NOTE — NURSING NOTE
0020 Rapid response callled dt low o2. Patient did not recover when bumped up to 6 L nc. RRT called and patient put on nonrebreather. Hematoma increased in size since beginning of shift. Patient responds to repeated stimulation.     At the time of this note patient is on bipap

## 2025-07-23 NOTE — PROGRESS NOTES
"Palliative Care Daily Progress Note     S: Clinically, the patient continues to be critically ill and is on BiPAP.  He is somewhat restless but is tolerating it at the present time with good oxygenation.  Son and wife were here but were gone when the patient was seen.  We need to set up a meeting to review comfort measures and hospice services with them.    O:   Palliative Performance Scale Score: 10%   /98 (BP Location: Left arm, Patient Position: Lying)   Pulse 85   Temp 98.2 °F (36.8 °C) (Axillary)   Resp 25   Ht 177.8 cm (70\")   Wt 64.9 kg (143 lb)   SpO2 100%   BMI 20.52 kg/m²     Intake/Output Summary (Last 24 hours) at 7/23/2025 1542  Last data filed at 7/23/2025 0544  Gross per 24 hour   Intake 604.94 ml   Output --   Net 604.94 ml       PE:  General Appearance:    Chronically ill-appearing, on BiPAP   HEENT:    NC/A   Neck:   supple   Lungs:   Labored breathing    Heart:  Tachycardic   Abdomen:     Soft, NT, ND, NABS    Extremities:   2+ edema   Pulses:   Pulses palpable    Skin:   Warm, dry   Neurologic: Awake, nonverbal, restless   Psych: Anxious         Meds: Reviewed     Labs:   Results from last 7 days   Lab Units 07/23/25  1206 07/23/25  0834   WBC 10*3/mm3  --  9.76   HEMOGLOBIN g/dL 9.6* 9.7*   HEMATOCRIT % 32.0* 30.8*   PLATELETS 10*3/mm3  --  226     Results from last 7 days   Lab Units 07/23/25  1207 07/23/25  0834   SODIUM mmol/L 143 143   POTASSIUM mmol/L 4.6 4.8   CHLORIDE mmol/L  --  114*   CO2 mmol/L  --  22.1   BUN mg/dL  --  8.7   CREATININE mg/dL  --  0.80   GLUCOSE mg/dL  --  86   CALCIUM mg/dL  --  8.3*     Results from last 7 days   Lab Units 07/23/25  1207 07/23/25  0834   SODIUM mmol/L 143 143   POTASSIUM mmol/L 4.6 4.8   CHLORIDE mmol/L  --  114*   CO2 mmol/L  --  22.1   BUN mg/dL  --  8.7   CREATININE mg/dL  --  0.80   CALCIUM mg/dL  --  8.3*   BILIRUBIN mg/dL  --  0.8   ALK PHOS U/L  --  61   ALT (SGPT) U/L  --  30   AST (SGOT) U/L  --  33   GLUCOSE mg/dL  --  86 "     Imaging Results (Last 72 Hours)       Procedure Component Value Units Date/Time    XR Chest 1 View [[id number]] Collected: 07/22/25 2359     Updated: 07/23/25 0004    Narrative:      XR CHEST 1 VW    Date of Exam: 7/22/2025 11:16 PM EDT    Indication: dyspnea    Comparison: CT chest 7/21/2025.    Findings:  There is persistent patchy airspace disease versus atelectasis in the right lung base. Increased density over the right lateral chest, likely related to known pectoralis hematoma. There is new partial atelectasis in the left lung base. No pneumothorax or   definite pleural effusion. No acute osseous abnormality. Heart size and pulmonary vasculature appear within normal limits.      Impression:      Impression:  1.Persistent patchy airspace disease versus atelectasis in the right lung base.  2.New partial atelectasis in the left lung base.          Electronically Signed: Jd Pendleton MD    7/23/2025 12:01 AM EDT    Workstation ID: [id number]    CT Chest Without Contrast Diagnostic [[id number]] Collected: 07/21/25 1536     Updated: 07/21/25 1551    Narrative:      CT CHEST WO CONTRAST DIAGNOSTIC, CT ABDOMEN PELVIS WO CONTRAST    Date of Exam: 7/21/2025 3:16 PM EDT    Indication: Anterior chest bruising, frequent falls.    Comparison: None available.    Technique: Axial CT images were obtained of the chest abdomen and pelvis without contrast administration.  Reconstructed coronal and sagittal images were also obtained. Automated exposure control and iterative construction methods were used.      Findings:  CT CHEST:  MEDIASTINUM: There is a moderate size sliding hiatal hernia. There are aortic valvular calcifications aortic and heart size are normal. No mass nor pericardial effusion.  CORONARY ARTERIES: There is calcified atherosclerotic disease.  LUNGS: There is minimal right basal airspace disease, likely atelectasis. No additional consolidation. No significant nodule nor interstitial changes.  PLEURAL SPACE:  Trace right pleural effusion.  LYMPH NODES: No pathologically enlarged thoracic nodes.    There is a heterogeneous mass centered within the right pectoralis major muscle which is only partially visualized given field-of-view. This measures at least 15 cm in transverse dimension by 5 cm in AP dimension. This likely represents an intramuscular   hematoma. There is surrounding subcutaneous edema/ecchymosis. Correlate with history as neoplasm not excluded.    CT ABDOMEN AND PELVIS:   LIVER:  Unremarkable parenchyma without focal lesion.  BILIARY/GALLBLADDER:  Unremarkable  SPLEEN:  Unremarkable  PANCREAS:  Unremarkable  ADRENAL:  Unremarkable  KIDNEYS:  Unremarkable parenchyma with no solid mass identified. No obstruction.  No calculus identified.  GASTROINTESTINAL/MESENTERY: There is mural thickening with moderate fecal load involving the rectum. There is associated perirectal/presacral edema. 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 Correlate with history and symptoms.  AORTA/IVC: IVC filter is present on imaging study. It is recommended that all patients with IVC filters in place have an active management care plan to monitor their IVC filter. If a care plan is not in place, patient should have a non emergent referral   to an interventional specialist such as interventional radiology or other interventional vascular specialist for establishment of an IVC filter management care plan.    RETROPERITONEUM/LYMPH NODES:  Unremarkable    REPRODUCTIVE:  Unremarkable  BLADDER:  Unremarkable    OSSEUS STRUCTURES:  Typical for age with no acute process identified.    There is right anterior lateral abdominal wall subcutaneous edema versus ecchymosis.      Impression:      Impression:  1.There is a heterogeneous mass centered within the right pectoralis major muscle which is only partially visualized given field-of-view. This measures at least 15 cm in transverse dimension by 5 cm in AP dimension. This likely represents  an   intramuscular hematoma. There is surrounding subcutaneous edema/ecchymosis. Correlate with history as neoplasm not excluded.  2.There is mural thickening with moderate fecal load involving the rectum. There is associated perirectal/presacral edema. 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 Correlate with history and symptoms.  3.There is right anterior lateral abdominal wall subcutaneous edema versus ecchymosis.  4.Trace right pleural effusion.  5.Moderate size sliding hiatal hernia.        Electronically Signed: Clark Espinoza MD    7/21/2025 3:48 PM EDT    Workstation ID: [id number]    CT Abdomen Pelvis Without Contrast [[id number]] Collected: 07/21/25 1536     Updated: 07/21/25 1551    Narrative:      CT CHEST WO CONTRAST DIAGNOSTIC, CT ABDOMEN PELVIS WO CONTRAST    Date of Exam: 7/21/2025 3:16 PM EDT    Indication: Anterior chest bruising, frequent falls.    Comparison: None available.    Technique: Axial CT images were obtained of the chest abdomen and pelvis without contrast administration.  Reconstructed coronal and sagittal images were also obtained. Automated exposure control and iterative construction methods were used.      Findings:  CT CHEST:  MEDIASTINUM: There is a moderate size sliding hiatal hernia. There are aortic valvular calcifications aortic and heart size are normal. No mass nor pericardial effusion.  CORONARY ARTERIES: There is calcified atherosclerotic disease.  LUNGS: There is minimal right basal airspace disease, likely atelectasis. No additional consolidation. No significant nodule nor interstitial changes.  PLEURAL SPACE: Trace right pleural effusion.  LYMPH NODES: No pathologically enlarged thoracic nodes.    There is a heterogeneous mass centered within the right pectoralis major muscle which is only partially visualized given field-of-view. This measures at least 15 cm in transverse dimension by 5 cm in AP dimension. This likely represents an intramuscular   hematoma. There is  surrounding subcutaneous edema/ecchymosis. Correlate with history as neoplasm not excluded.    CT ABDOMEN AND PELVIS:   LIVER:  Unremarkable parenchyma without focal lesion.  BILIARY/GALLBLADDER:  Unremarkable  SPLEEN:  Unremarkable  PANCREAS:  Unremarkable  ADRENAL:  Unremarkable  KIDNEYS:  Unremarkable parenchyma with no solid mass identified. No obstruction.  No calculus identified.  GASTROINTESTINAL/MESENTERY: There is mural thickening with moderate fecal load involving the rectum. There is associated perirectal/presacral edema. 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 Correlate with history and symptoms.  AORTA/IVC: IVC filter is present on imaging study. It is recommended that all patients with IVC filters in place have an active management care plan to monitor their IVC filter. If a care plan is not in place, patient should have a non emergent referral   to an interventional specialist such as interventional radiology or other interventional vascular specialist for establishment of an IVC filter management care plan.    RETROPERITONEUM/LYMPH NODES:  Unremarkable    REPRODUCTIVE:  Unremarkable  BLADDER:  Unremarkable    OSSEUS STRUCTURES:  Typical for age with no acute process identified.    There is right anterior lateral abdominal wall subcutaneous edema versus ecchymosis.      Impression:      Impression:  1.There is a heterogeneous mass centered within the right pectoralis major muscle which is only partially visualized given field-of-view. This measures at least 15 cm in transverse dimension by 5 cm in AP dimension. This likely represents an   intramuscular hematoma. There is surrounding subcutaneous edema/ecchymosis. Correlate with history as neoplasm not excluded.  2.There is mural thickening with moderate fecal load involving the rectum. There is associated perirectal/presacral edema. 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 Correlate with history and symptoms.  3.There is right anterior  lateral abdominal wall subcutaneous edema versus ecchymosis.  4.Trace right pleural effusion.  5.Moderate size sliding hiatal hernia.        Electronically Signed: Clark Espinoza MD    7/21/2025 3:48 PM EDT    Workstation ID: [id number]    CT Head Without Contrast [[id number]] Collected: 07/21/25 1533     Updated: 07/21/25 1548    Narrative:      CT HEAD WO CONTRAST    Date of Exam: 7/21/2025 3:16 PM EDT    Indication: frequent falls.    Comparison: 7/9/2025    Technique: Axial CT images were obtained of the head without contrast administration.  Automated exposure control and iterative construction methods were used.      Findings:  There is no evidence of acute territorial infarction.    There is no acute intracranial hemorrhage. There are no extra-axial collections.    Ventricles and CSF spaces are symmetrically prominent. No mass effect nor hydrocephalus.    Brain parenchyma appears unchanged.     Paranasal sinuses and mastoid air cells are adequately aerated.     Osseous structures and orbits appear intact.      Impression:      Impression:  1.No acute intracranial process is identified.  2.Generalized atrophy.        Electronically Signed: Clark Espinoza MD    7/21/2025 3:36 PM EDT    Workstation ID: [id number]    XR Chest 1 View [[id number]] Collected: 07/21/25 1426     Updated: 07/21/25 1429    Narrative:      XR CHEST 1 VW    Date of Exam: 7/21/2025 2:10 PM EDT    Indication: Leg edema    Comparison: None available.    Findings:  Normal cardiomediastinal silhouette. The lungs are clear. No pleural effusion or pneumothorax. No acute osseous findings.      Impression:      Impression:  No acute cardiopulmonary findings.        Electronically Signed: Agusto Goodman MD    7/21/2025 2:26 PM EDT    Workstation ID: [id number]              Diagnostics: Reviewed    A: Clinically continues to do poorly and has not tolerated any weaning.  On BiPAP he is maintaining oxygenation.  Son and wife were not present and we will try to  set up a meeting to review comfort measures and hospice services.      P: Continue present management.  Will try to arrange a meeting with family to discuss palliative measures and hospice.      We will continue to follow along. Please do not hesitate to contact us regarding further sx mgmt or GOC needs, including after hours or on weekends via our on call provider at [phone].     Augusto Reyes MD    7/23/2025

## 2025-07-23 NOTE — CASE MANAGEMENT/SOCIAL WORK
Discharge Planning Assessment  [facility]     Patient Name: Maxim Henson  MRN: [id number]  Today's Date: 7/23/2025    Admit Date: 7/21/2025    Plan: IDP   Discharge Needs Assessment       Row Name 07/23/25 1010       Living Environment    People in Home facility resident    Unique Family Situation [facility]    Current Living Arrangements extended care facility    Potentially Unsafe Housing Conditions unable to assess    In the past 12 months has the electric, gas, oil, or water company threatened to shut off services in your home? No    Primary Care Provided by self    Provides Primary Care For no one, unable/limited ability to care for self;no one    Family Caregiver if Needed other (see comments)    Quality of Family Relationships unable to assess       Resource/Environmental Concerns    Resource/Environmental Concerns none    Transportation Concerns none       Transportation Needs    In the past 12 months, has lack of transportation kept you from medical appointments or from getting medications? no    In the past 12 months, has lack of transportation kept you from meetings, work, or from getting things needed for daily living? No       Food Insecurity    Within the past 12 months, you worried that your food would run out before you got the money to buy more. Never true    Within the past 12 months, the food you bought just didn't last and you didn't have money to get more. Never true       Transition Planning    Patient/Family Anticipates Transition to home with help/services    Patient/Family Anticipated Services at Transition none       Discharge Needs Assessment    Readmission Within the Last 30 Days no previous admission in last 30 days    Equipment Currently Used at Home wheelchair    Concerns to be Addressed no discharge needs identified;denies needs/concerns at this time    Do you want help finding or keeping work or a job? I do not need or want help    Do you want help with school or training? For  example, starting or completing job training or getting a high school diploma, GED or equivalent No    Anticipated Changes Related to Illness none    Equipment Needed After Discharge none                   Discharge Plan       Row Name 07/23/25 1012       Plan    Plan IDP    Patient/Family in Agreement with Plan yes    Plan Comments Spoke with Radhika LEWIS at [facility]. Pt resides at [facility] and has been there since 7/5/2025. Pt is assist x1 most days to . Nurse states that pt doesnt say much. PCP and meds through facility. Plan is TBD with GOC. CM will cont to follow for discharge planning.                  Continued Care and Services - Admitted Since 7/21/2025    No active coordination exists.          Demographic Summary       Row Name 07/23/25 1010       General Information    Admission Type inpatient    Arrived From emergency department;home    Referral Source admission list    Reason for Consult decision-making    Preferred Language English                   Functional Status       Row Name 07/23/25 1010       Functional Status    Usual Activity Tolerance moderate    Current Activity Tolerance fair       Physical Activity    On average, how many days per week do you engage in moderate to strenuous exercise (like a brisk walk)? 0 days    On average, how many minutes do you engage in exercise at this level? 0 min    Number of minutes of exercise per week 0       Functional Status, IADL    Medications assistive person    Meal Preparation assistive person    Housekeeping assistive person    Laundry assistive person    Shopping assistive person    If for any reason you need help with day-to-day activities such as bathing, preparing meals, shopping, managing finances, etc., do you get the help you need? Patient unable to answer                   Psychosocial    No documentation.                  Abuse/Neglect    No documentation.                  Legal    No documentation.                   Substance Abuse    No documentation.                  Patient Forms    No documentation.                     Elisa Berry RN

## 2025-07-23 NOTE — CONSULTS
Nutrition Services    Patient Name:  Maxim Henson  YOB: 1950  MRN: [id number]  Admit Date:  7/21/2025    Consult received for [facility] 7/23    Electronically signed by:  Jackie Mcnulty RD  07/22/25 23:21 EDT

## 2025-07-23 NOTE — PROGRESS NOTES
[facility] Medicine Services  PROGRESS NOTE    Patient Name: Maxim Henson  : 1950  MRN: [id number]    Date of Admission: 2025  Primary Care Physician: Lynn Orozco APRN    Subjective   Subjective     CC: S/P Falls    HPI: Eventful night as per night team checkout and significant notes on chart.  Pt now on Bipap and unable to wean thus far.  Son eventually at bedside and was open to hospice discussion once his mom (patient's wife) arrives.       Objective   Objective     Vital Signs:   Temp:  [97.6 °F (36.4 °C)-100.1 °F (37.8 °C)] 98 °F (36.7 °C)  Heart Rate:  [51-91] 66  Resp:  [17-25] 25  BP: ()/() 119/90  Flow (L/min) (Oxygen Therapy):  [3-15] 15     Physical Exam:  Constitutional: non-responsive, on Bipap  HENT: NCAT, mucous membranes moist  Respiratory: Clear to auscultation bilaterally, respiratory effort normal   Cardiovascular: RRR, no murmurs, rubs, or gallops  Gastrointestinal: Positive bowel sounds, soft, nontender, nondistended  Musculoskeletal: No bilateral ankle edema  Psychiatric: WOO  Neurologic: WOO  Skin: No rashes, R chest wall hematoma     Results Reviewed:  LAB RESULTS:      Lab 25  0834 25  0037 25  2331 25  1439 25  0944 25  0313 25  0000 25  1628 25  1432   WBC 9.76  --  6.36  --   --  5.68  --   --  8.35   HEMOGLOBIN 9.7*  --  8.8* 8.2* 6.9* 6.8*   < >  --  8.4*   HEMATOCRIT 30.8*  --  27.5* 25.8* 22.1* 22.6*   < >  --  27.0*   PLATELETS 226  --  197  --   --  205  --   --  244   NEUTROS ABS  --   --  4.76  --   --  4.08  --   --  6.68   IMMATURE GRANS (ABS)  --   --  0.03  --   --  0.02  --   --  0.04   LYMPHS ABS  --   --  0.72  --   --  0.72  --   --  0.68*   MONOS ABS  --   --  0.73  --   --  0.74  --   --  0.85   EOS ABS  --   --  0.09  --   --  0.09  --   --  0.05   MCV 96.9  --  97.2*  --   --  103.7*  --   --  102.3*   CRP  --   --   --   --   --   --   --   --  6.42*    PROCALCITONIN  --   --  0.11  --   --   --   --   --  0.12   LACTATE  --   --  0.9  --   --   --   --   --  1.2   D DIMER QUANT  --  1.91*  --   --   --   --   --   --   --    HSTROP T  --   --   --   --   --   --   --  115* 142*    < > = values in this interval not displayed.         Lab 07/23/25  0834 07/22/25  2331 07/22/25 2220 07/22/25  1551 07/22/25  1439 07/22/25  0944 07/22/25  0313 07/21/25  2212 07/21/25  1905 07/21/25  1432   SODIUM 143 148* 145  145 148* 147*   < > 149*   < > 149* 152*   POTASSIUM 4.8  --  3.1*  --   --   --  3.2*  --  3.5 3.7   CHLORIDE 114*  --  117*  --   --   --  119*  --  117* 118*   CO2 22.1  --  21.0*  --   --   --  24.0  --  26.0 24.8   ANION GAP 6.9  --  7.0  --   --   --  6.0  --  6.0 9.2   BUN 8.7  --  8.9  --   --   --  12.0  --  14.4 15.1   CREATININE 0.80  --  0.67*  --   --   --  0.74*  --  0.76 0.81   EGFR 91.7  --  96.8  --   --   --  93.9  --  93.2 91.4   GLUCOSE 86  --  101*  --   --   --  90  --  96 97   CALCIUM 8.3*  --  7.8*  --   --   --  7.9*  --  8.0* 8.7   MAGNESIUM  --   --  2.2  --   --   --   --   --   --  2.5*   TSH  --   --   --   --   --   --   --   --   --  17.600*    < > = values in this interval not displayed.         Lab 07/23/25  0834 07/22/25 2220 07/21/25  1432   TOTAL PROTEIN 5.1* 4.4* 5.2*   ALBUMIN 2.6* 2.3* 2.7*   GLOBULIN 2.5 2.1 2.5   ALT (SGPT) 30 30 41   AST (SGOT) 33 35 45*   BILIRUBIN 0.8 0.6 0.6   ALK PHOS 61 51 61         Lab 07/21/25  1628 07/21/25  1432   PROBNP  --  345.0   HSTROP T 115* 142*             Lab 07/21/25 2059 07/21/25  1905   IRON 17* 14*   IRON SATURATION (TSAT)  --  7*   TIBC  --  188*   TRANSFERRIN  --  126*   FERRITIN  --  248.00   FOLATE 4.19*  --    VITAMIN B 12 1,385*  --    ABO TYPING O  --    RH TYPING Positive  --    ANTIBODY SCREEN Negative  --          Lab 07/23/25  0038   PH, ARTERIAL 7.452*   PCO2, ARTERIAL 36.5   PO2 ART 51.4*   FIO2 44   HCO3 ART 25.4   BASE EXCESS ART 1.5   CARBOXYHEMOGLOBIN 1.7      Brief Urine Lab Results  (Last result in the past 365 days)        Color   Clarity   Blood   Leuk Est   Nitrite   Protein   CREAT   Urine HCG        07/21/25 1439 Yellow   Clear   Negative   Trace   Negative   Trace                   Microbiology Results Abnormal       None            XR Chest 1 View  Result Date: 7/23/2025  XR CHEST 1 VW Date of Exam: 7/22/2025 11:16 PM EDT Indication: dyspnea Comparison: CT chest 7/21/2025. Findings: There is persistent patchy airspace disease versus atelectasis in the right lung base. Increased density over the right lateral chest, likely related to known pectoralis hematoma. There is new partial atelectasis in the left lung base. No pneumothorax or  definite pleural effusion. No acute osseous abnormality. Heart size and pulmonary vasculature appear within normal limits.     Impression: Impression: 1.Persistent patchy airspace disease versus atelectasis in the right lung base. 2.New partial atelectasis in the left lung base. Electronically Signed: Jd Pendleton MD  7/23/2025 12:01 AM EDT  Workstation ID: [id number]    CT Chest Without Contrast Diagnostic  Result Date: 7/21/2025  CT CHEST WO CONTRAST DIAGNOSTIC, CT ABDOMEN PELVIS WO CONTRAST Date of Exam: 7/21/2025 3:16 PM EDT Indication: Anterior chest bruising, frequent falls. Comparison: None available. Technique: Axial CT images were obtained of the chest abdomen and pelvis without contrast administration.  Reconstructed coronal and sagittal images were also obtained. Automated exposure control and iterative construction methods were used. Findings: CT CHEST: MEDIASTINUM: There is a moderate size sliding hiatal hernia. There are aortic valvular calcifications aortic and heart size are normal. No mass nor pericardial effusion. CORONARY ARTERIES: There is calcified atherosclerotic disease. LUNGS: There is minimal right basal airspace disease, likely atelectasis. No additional consolidation. No significant nodule nor  interstitial changes. PLEURAL SPACE: Trace right pleural effusion. LYMPH NODES: No pathologically enlarged thoracic nodes. There is a heterogeneous mass centered within the right pectoralis major muscle which is only partially visualized given field-of-view. This measures at least 15 cm in transverse dimension by 5 cm in AP dimension. This likely represents an intramuscular hematoma. There is surrounding subcutaneous edema/ecchymosis. Correlate with history as neoplasm not excluded. CT ABDOMEN AND PELVIS:  LIVER:  Unremarkable parenchyma without focal lesion. BILIARY/GALLBLADDER:  Unremarkable SPLEEN:  Unremarkable PANCREAS:  Unremarkable ADRENAL:  Unremarkable KIDNEYS:  Unremarkable parenchyma with no solid mass identified. No obstruction.  No calculus identified. GASTROINTESTINAL/MESENTERY: There is mural thickening with moderate fecal load involving the rectum. There is associated perirectal/presacral edema. 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 Correlate with history and symptoms. AORTA/IVC: IVC filter is present on imaging study. It is recommended that all patients with IVC filters in place have an active management care plan to monitor their IVC filter. If a care plan is not in place, patient should have a non emergent referral to an interventional specialist such as interventional radiology or other interventional vascular specialist for establishment of an IVC filter management care plan. RETROPERITONEUM/LYMPH NODES:  Unremarkable REPRODUCTIVE:  Unremarkable BLADDER:  Unremarkable OSSEUS STRUCTURES:  Typical for age with no acute process identified. There is right anterior lateral abdominal wall subcutaneous edema versus ecchymosis.     Impression: Impression: 1.There is a heterogeneous mass centered within the right pectoralis major muscle which is only partially visualized given field-of-view. This measures at least 15 cm in transverse dimension by 5 cm in AP dimension. This likely represents an  intramuscular hematoma. There is surrounding subcutaneous edema/ecchymosis. Correlate with history as neoplasm not excluded. 2.There is mural thickening with moderate fecal load involving the rectum. There is associated perirectal/presacral edema. 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 Correlate with history and symptoms. 3.There is right anterior lateral abdominal wall subcutaneous edema versus ecchymosis. 4.Trace right pleural effusion. 5.Moderate size sliding hiatal hernia. Electronically Signed: Clark Espinoza MD  7/21/2025 3:48 PM EDT  Workstation ID: [id number]    CT Abdomen Pelvis Without Contrast  Result Date: 7/21/2025  CT CHEST WO CONTRAST DIAGNOSTIC, CT ABDOMEN PELVIS WO CONTRAST Date of Exam: 7/21/2025 3:16 PM EDT Indication: Anterior chest bruising, frequent falls. Comparison: None available. Technique: Axial CT images were obtained of the chest abdomen and pelvis without contrast administration.  Reconstructed coronal and sagittal images were also obtained. Automated exposure control and iterative construction methods were used. Findings: CT CHEST: MEDIASTINUM: There is a moderate size sliding hiatal hernia. There are aortic valvular calcifications aortic and heart size are normal. No mass nor pericardial effusion. CORONARY ARTERIES: There is calcified atherosclerotic disease. LUNGS: There is minimal right basal airspace disease, likely atelectasis. No additional consolidation. No significant nodule nor interstitial changes. PLEURAL SPACE: Trace right pleural effusion. LYMPH NODES: No pathologically enlarged thoracic nodes. There is a heterogeneous mass centered within the right pectoralis major muscle which is only partially visualized given field-of-view. This measures at least 15 cm in transverse dimension by 5 cm in AP dimension. This likely represents an intramuscular hematoma. There is surrounding subcutaneous edema/ecchymosis. Correlate with history as neoplasm not excluded. CT ABDOMEN AND  PELVIS:  LIVER:  Unremarkable parenchyma without focal lesion. BILIARY/GALLBLADDER:  Unremarkable SPLEEN:  Unremarkable PANCREAS:  Unremarkable ADRENAL:  Unremarkable KIDNEYS:  Unremarkable parenchyma with no solid mass identified. No obstruction.  No calculus identified. GASTROINTESTINAL/MESENTERY: There is mural thickening with moderate fecal load involving the rectum. There is associated perirectal/presacral edema. 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 Correlate with history and symptoms. AORTA/IVC: IVC filter is present on imaging study. It is recommended that all patients with IVC filters in place have an active management care plan to monitor their IVC filter. If a care plan is not in place, patient should have a non emergent referral to an interventional specialist such as interventional radiology or other interventional vascular specialist for establishment of an IVC filter management care plan. RETROPERITONEUM/LYMPH NODES:  Unremarkable REPRODUCTIVE:  Unremarkable BLADDER:  Unremarkable OSSEUS STRUCTURES:  Typical for age with no acute process identified. There is right anterior lateral abdominal wall subcutaneous edema versus ecchymosis.     Impression: Impression: 1.There is a heterogeneous mass centered within the right pectoralis major muscle which is only partially visualized given field-of-view. This measures at least 15 cm in transverse dimension by 5 cm in AP dimension. This likely represents an intramuscular hematoma. There is surrounding subcutaneous edema/ecchymosis. Correlate with history as neoplasm not excluded. 2.There is mural thickening with moderate fecal load involving the rectum. There is associated perirectal/presacral edema. 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 Correlate with history and symptoms. 3.There is right anterior lateral abdominal wall subcutaneous edema versus ecchymosis. 4.Trace right pleural effusion. 5.Moderate size sliding hiatal hernia.  Electronically Signed: Clark Espinoza MD  7/21/2025 3:48 PM EDT  Workstation ID: [id number]    CT Head Without Contrast  Result Date: 7/21/2025  CT HEAD WO CONTRAST Date of Exam: 7/21/2025 3:16 PM EDT Indication: frequent falls. Comparison: 7/9/2025 Technique: Axial CT images were obtained of the head without contrast administration.  Automated exposure control and iterative construction methods were used. Findings: There is no evidence of acute territorial infarction. There is no acute intracranial hemorrhage. There are no extra-axial collections. Ventricles and CSF spaces are symmetrically prominent. No mass effect nor hydrocephalus. Brain parenchyma appears unchanged.  Paranasal sinuses and mastoid air cells are adequately aerated.  Osseous structures and orbits appear intact.     Impression: Impression: 1.No acute intracranial process is identified. 2.Generalized atrophy. Electronically Signed: Clark Espinoza MD  7/21/2025 3:36 PM EDT  Workstation ID: [id number]    XR Chest 1 View  Result Date: 7/21/2025  XR CHEST 1 VW Date of Exam: 7/21/2025 2:10 PM EDT Indication: Leg edema Comparison: None available. Findings: Normal cardiomediastinal silhouette. The lungs are clear. No pleural effusion or pneumothorax. No acute osseous findings.     Impression: Impression: No acute cardiopulmonary findings. Electronically Signed: Agusto Goodman MD  7/21/2025 2:26 PM EDT  Workstation ID: [id number]          Current medications:  Scheduled Meds:levothyroxine, 125 mcg, Oral, Q AM  memantine, 10 mg, Oral, Q12H  mirtazapine, 15 mg, Oral, Nightly  OLANZapine zydis, 5 mg, Oral, Nightly  piperacillin-tazobactam, 3.375 g, Intravenous, Q8H  senna-docusate sodium, 2 tablet, Oral, BID  sodium chloride, 500 mL, Intravenous, Once  sodium chloride, 10 mL, Intravenous, Q12H      Continuous Infusions:     PRN Meds:.  acetaminophen **OR** acetaminophen **OR** acetaminophen    senna-docusate sodium **AND** polyethylene glycol **AND** bisacodyl  **AND** bisacodyl    Calcium Replacement - Follow Nurse / BPA Driven Protocol    dextrose    dextrose    glucagon (human recombinant)    ipratropium-albuterol    Magnesium Standard Dose Replacement - Follow Nurse / BPA Driven Protocol    nitroglycerin    Phosphorus Replacement - Follow Nurse / BPA Driven Protocol    Potassium Replacement - Follow Nurse / BPA Driven Protocol    [COMPLETED] Insert Peripheral IV **AND** sodium chloride    [COMPLETED] Insert Peripheral IV **AND** sodium chloride    sodium chloride    sodium chloride    Assessment & Plan   Assessment & Plan     Active Hospital Problems    Diagnosis  POA    **Hypernatremia [E87.0]  Yes    Alzheimer dementia [G30.9, F02.80]  Yes    Hypocalcemia [E83.51]  Yes    Bacteriuria [R82.71]  Yes    Macrocytic anemia [D53.9]  Yes    Hypothyroidism (acquired) [E03.9]  Yes    History of pulmonary embolism [Z86.711]  Unknown      Resolved Hospital Problems   No resolved problems to display.        Brief Hospital Course to date:  Maxim Henson is a 74 y.o. male with history of dementia, aphasia, hypothyroidism, prior PE/DVT here s/p falls. He was found to have chest wall hematoma.  Overnight, pt had acute respiratory failure and is now on Bipap and unable to wean.  Family has decided on DNR/DNI and are contemplating hospice/comfort measures.    Acute Respiratory Failure  -- now on Bipap, wean as tolerated  -- continue empiric ABX for possible aspiration  -- cannot r/o PE due to being unable to tolerate CTA, however, unable to add anticoagulation back due to below. Low suspicion, though, as he was anticoagulated up until admission.     Hypernatremia  -volume depletion, s/p IVFs    Falls  -with bruising  -R pectoralis muscle hematoma  -weakness, frequent hypotension noted on prior cardiology/neurology notes  -likely should DC further AC use  -PT/OT    Anemia  -PRBC x 2 ordered  -possibly due to falls/hematomas  -monitor GI output given below    Stercoral proctitis  -bowel  regimen    Advanced Alzheimer's  -progressive aphasia per neurology notes  -vascular Parkinsonian features as well  -fall risk  -continue home meds as able    Hypothyroidism  -synthroid    Bacteruria  -urine culture not sent on admission, but pt is now Zosyn, continue    GOC  -- consult Hospice, suspect he may be inpatient hospice appropriate. Discussed with family and they are open to this discussion   -- DNR/DNI, however, wife did mention that blood transfusions were okay on 7/22      Hx of PE/DVT  -hold xarelto        Expected Discharge Location and Transportation: likely transition to inpatient hospice   Expected Discharge   Expected Discharge Date: 7/24/2025; Expected Discharge Time:      VTE Prophylaxis:  Pharmacologic & mechanical VTE prophylaxis orders are present.         AM-PAC 6 Clicks Score (PT): 6 (07/21/25 6431)    CODE STATUS:   Code Status and Medical Interventions: No CPR (Do Not Attempt to Resuscitate); Limited Support; No intubation (DNI), No artificial nutrition, No dialysis   Ordered at: 07/22/25 1216     Code Status (Patient has no pulse and is not breathing):    No CPR (Do Not Attempt to Resuscitate)     Medical Interventions (Patient has pulse or is breathing):    Limited Support     Medical Intervention Limits:    No intubation (DNI)       No artificial nutrition       No dialysis     Level Of Support Discussed With:    Next of Kin (If No Surrogate)       Health Care Surrogate       Queta Larose MD  07/23/25

## 2025-07-23 NOTE — CONSULTS
Continued Stay Note  [facility]     Patient Name: Maxim Henson  MRN: [id number]  Today's Date: 7/23/2025    Admit Date: 7/21/2025    Plan: To be determined   Discharge Plan       Row Name 07/23/25 1234       Plan    Plan To be determined    Plan Comments Telephone from Ruthann JOE, palliative care, stating has met with pt's family, the wife wants hospice information. Visit made to pt, pt on the bipap, appears to be sleeping, pt's wife and son present. Wife stated pt has been living in a facility, does not want pt to return to a facility, is considering taking pt home. Teaching done on hospice services, goals of care, team visits, equipment, medications, supplies. Pt lives in [facility] which is serviced by [facility]. Wife stated will talk with the family regarding taking pt home with hospice. Provided wife with a Hospice [facility] brochure. Will continue to follow. Please call [phone] if can be of further assistance.      Row Name 07/23/25 1012       Plan    Plan     Patient/Family in Agreement with Plan     Plan Comments                    Discharge Codes    No documentation.                 Expected Discharge Date and Time       Expected Discharge Date Expected Discharge Time    Jul 24, 2025               Ana Ferrell RN

## 2025-07-23 NOTE — SIGNIFICANT NOTE
Pt is unable to participate in skilled intervention due to medical status. Hospice consult pending. OT will d/c at this time.

## 2025-07-23 NOTE — NURSING NOTE
Patient o2 at 82% on bipap. Call placed to Rt to see f setting were changed recently. Rt to come to room to adjust settings.  At the time of this note patient is currently 88-89%    0352 RT at bedside.

## 2025-07-23 NOTE — PLAN OF CARE
Goal Outcome Evaluation:  Plan of Care Reviewed With: spouse, child        Progress: declining  Outcome Evaluation: Pt seen at 1250, wife and son present. Pt sleeping, on BiPap, no observable signs of discomfort. Per unit RN, attempting to wean BiPap. Hospice consult placed earlier, Hospice Liaison had been to room and provided information for outpatient hospice in [facility]. Pt has no PO intake documented, remains NPO. Asked Dr. Reyes to see, clarify goal of wean/parameters of comfort interventions, and make meds available for alleviation of symptoms as appropriate. Palliative following for continued support in [facility]/POC.     0930: Palliative IDT discussion: TATYANA, RN, SW,   After hours, weekends and holidays, contact Palliative Provider by calling [phone]       Problem: Palliative Care  Goal: Enhanced Quality of Life  Outcome: Progressing  Intervention: Maximize Comfort  Flowsheets (Taken 7/23/2025 1421)  Pain Management Interventions: pain management plan reviewed with patient/caregiver  Intervention: Optimize Function  Flowsheets (Taken 7/23/2025 1421)  Sensory Stimulation Regulation: quiet environment promoted  Sleep/Rest Enhancement:   awakenings minimized   consistent schedule promoted   family presence promoted  Intervention: Promote Advance Care Planning  Flowsheets (Taken 7/23/2025 1421)  Life Transition/Adjustment: (Hospice consult for information; may be eligible for inpatient hospice if unable to wean NIPPV)   palliative care discussed   palliative care initiated   other (see comments)  Intervention: Optimize Psychosocial Wellbeing  Flowsheets (Taken 7/23/2025 1421)  Supportive Measures:   active listening utilized   decision-making supported   positive reinforcement provided   verbalization of feelings encouraged  Spiritual Activities Assistance: (Spiritual Care consult) other (see comments)  Family/Support System Care:   caregiver stress acknowledged   family care conference  arranged   involvement promoted   presence promoted   self-care encouraged   support provided

## 2025-07-24 NOTE — PROGRESS NOTES
[facility] Medicine Services  PROGRESS NOTE    Patient Name: Maxim Henson  : 1950  MRN: [id number]    Date of Admission: 2025  Primary Care Physician: Lynn Orozco APRN    Subjective   Subjective     CC: S/P Falls    HPI: Doing okay this am, now on NC. Wife at bedside.       Objective   Objective     Vital Signs:   Temp:  [98 °F (36.7 °C)-100.4 °F (38 °C)] 98.2 °F (36.8 °C)  Heart Rate:  [] 72  Resp:  [16-25] 22  BP: (117-151)/(77-99) 117/77  Flow (L/min) (Oxygen Therapy):  [3] 3     Physical Exam:  Constitutional: non-responsive, on 3L BNC  HENT: NCAT, mucous membranes moist  Respiratory: Clear to auscultation bilaterally, respiratory effort normal   Cardiovascular: RRR, no murmurs, rubs, or gallops  Gastrointestinal: Positive bowel sounds, soft, nontender, nondistended  Musculoskeletal: No bilateral ankle edema  Psychiatric: WOO  Neurologic: WOO  Skin: No rashes, R chest wall hematoma     Results Reviewed:  LAB RESULTS:      Lab 25  2343 25  1753 25  1206 25  0834 25  0037 25  2331 25  0944 25  0313 25  0000 25  1628 25  1432   WBC  --   --   --  9.76  --  6.36  --  5.68  --   --  8.35   HEMOGLOBIN 9.6* 10.0* 9.6* 9.7*  --  8.8*   < > 6.8*   < >  --  8.4*   HEMATOCRIT 29.3* 31.2* 32.0* 30.8*  --  27.5*   < > 22.6*   < >  --  27.0*   PLATELETS  --   --   --  226  --  197  --  205  --   --  244   NEUTROS ABS  --   --   --   --   --  4.76  --  4.08  --   --  6.68   IMMATURE GRANS (ABS)  --   --   --   --   --  0.03  --  0.02  --   --  0.04   LYMPHS ABS  --   --   --   --   --  0.72  --  0.72  --   --  0.68*   MONOS ABS  --   --   --   --   --  0.73  --  0.74  --   --  0.85   EOS ABS  --   --   --   --   --  0.09  --  0.09  --   --  0.05   MCV  --   --   --  96.9  --  97.2*  --  103.7*  --   --  102.3*   CRP  --   --   --   --   --   --   --   --   --   --  6.42*   PROCALCITONIN  --   --   --   --   --   0.11  --   --   --   --  0.12   LACTATE  --   --   --   --   --  0.9  --   --   --   --  1.2   D DIMER QUANT  --   --   --   --  1.91*  --   --   --   --   --   --    HSTROP T  --   --   --   --   --   --   --   --   --  115* 142*    < > = values in this interval not displayed.         Lab 07/23/25  2343 07/23/25  1753 07/23/25  1545 07/23/25  1207 07/23/25  0834 07/22/25  2331 07/22/25  2220 07/22/25  0944 07/22/25  0313 07/21/25  2212 07/21/25  1905 07/21/25  1432   SODIUM 142 144 143 143 143   < > 145  145   < > 149*   < > 149* 152*   POTASSIUM  --   --   --  4.6 4.8  --  3.1*  --  3.2*  --  3.5 3.7   CHLORIDE  --   --   --   --  114*  --  117*  --  119*  --  117* 118*   CO2  --   --   --   --  22.1  --  21.0*  --  24.0  --  26.0 24.8   ANION GAP  --   --   --   --  6.9  --  7.0  --  6.0  --  6.0 9.2   BUN  --   --   --   --  8.7  --  8.9  --  12.0  --  14.4 15.1   CREATININE  --   --   --   --  0.80  --  0.67*  --  0.74*  --  0.76 0.81   EGFR  --   --   --   --  91.7  --  96.8  --  93.9  --  93.2 91.4   GLUCOSE  --   --   --   --  86  --  101*  --  90  --  96 97   CALCIUM  --   --   --   --  8.3*  --  7.8*  --  7.9*  --  8.0* 8.7   MAGNESIUM  --   --   --   --   --   --  2.2  --   --   --   --  2.5*   TSH  --   --   --   --   --   --   --   --   --   --   --  17.600*    < > = values in this interval not displayed.         Lab 07/23/25  0834 07/22/25  2220 07/21/25  1432   TOTAL PROTEIN 5.1* 4.4* 5.2*   ALBUMIN 2.6* 2.3* 2.7*   GLOBULIN 2.5 2.1 2.5   ALT (SGPT) 30 30 41   AST (SGOT) 33 35 45*   BILIRUBIN 0.8 0.6 0.6   ALK PHOS 61 51 61         Lab 07/21/25  1628 07/21/25  1432   PROBNP  --  345.0   HSTROP T 115* 142*             Lab 07/21/25 2059 07/21/25  1905   IRON 17* 14*   IRON SATURATION (TSAT)  --  7*   TIBC  --  188*   TRANSFERRIN  --  126*   FERRITIN  --  248.00   FOLATE 4.19*  --    VITAMIN B 12 1,385*  --    ABO TYPING O  --    RH TYPING Positive  --    ANTIBODY SCREEN Negative  --          Lab  07/23/25  0038   PH, ARTERIAL 7.452*   PCO2, ARTERIAL 36.5   PO2 ART 51.4*   FIO2 44   HCO3 ART 25.4   BASE EXCESS ART 1.5   CARBOXYHEMOGLOBIN 1.7     Brief Urine Lab Results  (Last result in the past 365 days)        Color   Clarity   Blood   Leuk Est   Nitrite   Protein   CREAT   Urine HCG        07/21/25 1439 Yellow   Clear   Negative   Trace   Negative   Trace                   Microbiology Results Abnormal       None            XR Chest 1 View  Result Date: 7/23/2025  XR CHEST 1 VW Date of Exam: 7/22/2025 11:16 PM EDT Indication: dyspnea Comparison: CT chest 7/21/2025. Findings: There is persistent patchy airspace disease versus atelectasis in the right lung base. Increased density over the right lateral chest, likely related to known pectoralis hematoma. There is new partial atelectasis in the left lung base. No pneumothorax or  definite pleural effusion. No acute osseous abnormality. Heart size and pulmonary vasculature appear within normal limits.     Impression: Impression: 1.Persistent patchy airspace disease versus atelectasis in the right lung base. 2.New partial atelectasis in the left lung base. Electronically Signed: Jd Pendleton MD  7/23/2025 12:01 AM EDT  Workstation ID: [id number]          Current medications:  Scheduled Meds:levothyroxine, 125 mcg, Oral, Q AM  memantine, 10 mg, Oral, Q12H  mirtazapine, 15 mg, Oral, Nightly  OLANZapine zydis, 5 mg, Oral, Nightly  piperacillin-tazobactam, 3.375 g, Intravenous, Q8H  senna-docusate sodium, 2 tablet, Oral, BID  sodium chloride, 500 mL, Intravenous, Once  sodium chloride, 10 mL, Intravenous, Q12H      Continuous Infusions:     PRN Meds:.  acetaminophen **OR** acetaminophen **OR** acetaminophen    senna-docusate sodium **AND** polyethylene glycol **AND** bisacodyl **AND** bisacodyl    Calcium Replacement - Follow Nurse / BPA Driven Protocol    dextrose    dextrose    glucagon (human recombinant)    ipratropium-albuterol    Magnesium Standard Dose  Replacement - Follow Nurse / BPA Driven Protocol    nitroglycerin    Phosphorus Replacement - Follow Nurse / BPA Driven Protocol    Potassium Replacement - Follow Nurse / BPA Driven Protocol    [COMPLETED] Insert Peripheral IV **AND** sodium chloride    [COMPLETED] Insert Peripheral IV **AND** sodium chloride    sodium chloride    sodium chloride    Assessment & Plan   Assessment & Plan     Active Hospital Problems    Diagnosis  POA    **Hypernatremia [E87.0]  Yes    Alzheimer dementia [G30.9, F02.80]  Yes    Hypocalcemia [E83.51]  Yes    Bacteriuria [R82.71]  Yes    Macrocytic anemia [D53.9]  Yes    Hypothyroidism (acquired) [E03.9]  Yes    History of pulmonary embolism [Z86.711]  Unknown      Resolved Hospital Problems   No resolved problems to display.        Brief Hospital Course to date:  Maxim Henson is a 74 y.o. male with history of dementia, aphasia, hypothyroidism, prior PE/DVT here s/p falls. He was found to have chest wall hematoma.  Overnight, pt had acute respiratory failure and is now on Bipap and unable to wean.  Family has decided on DNR/DNI and are contemplating hospice/comfort measures.    Acute Respiratory Failure  Suspect Aspiration PNA  -- required Bipap, weaned to 3L BNC  -- continue empiric ABX for possible aspiration  -- cannot r/o PE due to being unable to tolerate CTA, however, unable to add anticoagulation back due to below. Low suspicion, though, as he was anticoagulated up until admission.     Hypernatremia  -volume depletion, s/p IVFs    Falls  -with bruising  -R pectoralis muscle hematoma  -weakness, frequent hypotension noted on prior cardiology/neurology notes  -likely should DC further AC use    Anemia  -PRBC x 2 ordered/given   -possibly due to falls/hematomas  -monitor GI output given below    Stercoral proctitis  -bowel regimen    Advanced Alzheimer's  -progressive aphasia per neurology notes  -vascular Parkinsonian features as well  -fall risk  -continue home meds as  able    Hypothyroidism  -synthroid    Bacteruria  -urine culture not sent on admission, but pt is now Zosyn, continue    GOC  -- consulted Hospice. Discussed with family and they are open to this discussion   -- DNR/DNI, however, wife did mention that blood transfusions were okay on 7/22      Hx of PE/DVT  -hold xarelto        Expected Discharge Location and Transportation: home with home hospice  Expected Discharge   Expected Discharge Date: 7/25/2025; Expected Discharge Time:      VTE Prophylaxis:  Pharmacologic & mechanical VTE prophylaxis orders are present.         AM-PAC 6 Clicks Score (PT): 6 (07/21/25 1021)    CODE STATUS:   Code Status and Medical Interventions: No CPR (Do Not Attempt to Resuscitate); Limited Support; No intubation (DNI), No artificial nutrition, No dialysis   Ordered at: 07/22/25 1216     Code Status (Patient has no pulse and is not breathing):    No CPR (Do Not Attempt to Resuscitate)     Medical Interventions (Patient has pulse or is breathing):    Limited Support     Medical Intervention Limits:    No intubation (DNI)       No artificial nutrition       No dialysis     Level Of Support Discussed With:    Next of Kin (If No Surrogate)       Health Care Surrogate       Queta Larose MD  07/24/25

## 2025-07-24 NOTE — PROGRESS NOTES
Continued Stay Note  [facility]     Patient Name: Maxim Henson  MRN: [id number]  Today's Date: 7/24/2025    Admit Date: 7/21/2025    Plan: To be determined   Discharge Plan       Row Name 07/24/25 1503       Plan    Plan To be determined    Plan Comments Visit made to pt, pt alert, moving around in the bed, asked pt if comfortable to which pt responded no, informed staff nurse. No family present. Telephone call to pt's wife regarding hospice referral, wife stated will be at the hospital tomorrow between 3-4, plan made to meet at that time. Please call [phone] if can be of further assistance.                   Discharge Codes    No documentation.                 Expected Discharge Date and Time       Expected Discharge Date Expected Discharge Time    Jul 25, 2025               Ana Ferrell RN

## 2025-07-24 NOTE — NURSING NOTE
Nursing reach out to [facility] about a new spot on the toe that was not noticed before.    It looks chronic in nature not sure what is really arterial.  I feel like it might be like fungal or dry skin got snagged underneath the nail and scabbed over.  It is very tiny.    However due to the patient's contractures and everything patient now has dependent edema around the feet and the ankles and his feet are cold.    Patient also has Svetich contracture PT went tween the legs that I could not barely get in to clean his groin I did have the start of a yeast smell so I placed dry go wicking fabric in there although the patient was not able to verbalize anything he did try to resist with his right arm.    Patient is not eating or moving most likely will have continued breakdown despite interventions.  I tried for 15 minutes to reposition the pillow to get his heels offloaded and the patient contracts them back and squeeze of the pillow to where there is only a thin layer of alternating pressure between his heels and the bed    Have discussed with nursing we will just have to try to attempt to clean the best we can.

## 2025-07-24 NOTE — PLAN OF CARE
Goal Outcome Evaluation:  Plan of Care Reviewed With: other (see comments) (pt unable; no family present at time of Palliative encounter)        Progress: no change  Outcome Evaluation: Pt seen at 1105; sleeping, no observable signs of discomfort, no family present. On O2nc, increased to 4L after recent repositioning with some increased work of breathing, per RN report. Family had been in this morning, may be returning this afternoon. Requested STEPHANE Land to notify Palliative office/RN if family returns; or, if after 1530 or Palliative office not available, to request family to choose a time tomorrow morning to meet and leave message for Palliative. Dr. PARUL Hernandez notified.     0930: Palliative IDT discussion: MD, TATYANA, RN, SW,   After hours, weekends and holidays, contact Palliative Provider by calling [phone]       Problem: Palliative Care  Goal: Enhanced Quality of Life  Outcome: Progressing  Intervention: Maximize Comfort  Flowsheets (Taken 7/24/2025 1356)  Pain Management Interventions: (no observable signs of discomfort when seen) other (see comments)  Intervention: Optimize Function  Flowsheets (Taken 7/24/2025 1356)  Sensory Stimulation Regulation: quiet environment promoted  Sleep/Rest Enhancement: awakenings minimized  Intervention: Optimize Psychosocial Wellbeing  Flowsheets (Taken 7/24/2025 1356)  Family/Support System Care: (unit staff to notify Palliative if family returns; otherwise, plan to arrange family mtg for tomorrow) family care conference arranged

## 2025-07-24 NOTE — PROGRESS NOTES
Nutrition Services    Patient Name:  Maxim Henson  YOB: 1950  MRN: [id number]  Admit Date:  7/21/2025    Consult received for poor intake. Plan for likely hospice noted. RN rpts pt not alert to eat . Gave menu to use if needed. Will follow and provide assessment if indicated.     Electronically signed by:  Jackie Mcnulty RD  07/23/25 21:41 EDT

## 2025-07-25 NOTE — PLAN OF CARE
Goal Outcome Evaluation:                   Anticipated Discharge Disposition (SLP): skilled nursing facility          SLP Swallowing Diagnosis: oral dysphagia, R/O pharyngeal dysphagia (07/25/25 2635)

## 2025-07-25 NOTE — PROGRESS NOTES
[facility] Medicine Services  PROGRESS NOTE    Patient Name: Maxim Henson  : 1950  MRN: [id number]    Date of Admission: 2025  Primary Care Physician: Lynn Orozco APRN    Subjective   Subjective     CC: S/P Falls    HPI: Pt's family report that he is more interactive today.  No other issues overnight.     Objective   Objective     Vital Signs:   Temp:  [97.4 °F (36.3 °C)-98.1 °F (36.7 °C)] 98.1 °F (36.7 °C)  Heart Rate:  [68-83] 68  Resp:  [16-18] 18  BP: (101-128)/(59-83) 107/62  Flow (L/min) (Oxygen Therapy):  [3-5] 4     Physical Exam:  Constitutional: non-responsive, on 3L BNC  HENT: NCAT, mucous membranes moist  Respiratory: Clear to auscultation bilaterally, respiratory effort normal   Cardiovascular: RRR, no murmurs, rubs, or gallops  Gastrointestinal: Positive bowel sounds, soft, nontender, nondistended  Musculoskeletal: No bilateral ankle edema  Psychiatric: WOO  Neurologic: WOO  Skin: No rashes, R chest wall hematoma     Results Reviewed:  LAB RESULTS:      Lab 25  1040 25  0402 25  1907 25  1405 25  0753 25  1206 25  0834 25  0037 25  2331 25  0944 25  0313 25  0000 25  1628 25  1432   WBC  --   --   --   --  9.18  --  9.76  --  6.36  --  5.68  --   --  8.35   HEMOGLOBIN 9.5* 9.6* 10.7* 10.9* 11.1*   < > 9.7*  --  8.8*   < > 6.8*   < >  --  8.4*   HEMATOCRIT 29.7* 29.2* 32.2* 33.5* 34.2*   < > 30.8*  --  27.5*   < > 22.6*   < >  --  27.0*   PLATELETS  --   --   --   --  251  --  226  --  197  --  205  --   --  244   NEUTROS ABS  --   --   --   --  7.41*  --   --   --  4.76  --  4.08  --   --  6.68   IMMATURE GRANS (ABS)  --   --   --   --  0.04  --   --   --  0.03  --  0.02  --   --  0.04   LYMPHS ABS  --   --   --   --  0.74  --   --   --  0.72  --  0.72  --   --  0.68*   MONOS ABS  --   --   --   --  0.85  --   --   --  0.73  --  0.74  --   --  0.85   EOS ABS  --   --   --    --  0.11  --   --   --  0.09  --  0.09  --   --  0.05   MCV  --   --   --   --  95.5  --  96.9  --  97.2*  --  103.7*  --   --  102.3*   CRP  --   --   --   --   --   --   --   --   --   --   --   --   --  6.42*   PROCALCITONIN  --   --   --   --   --   --   --   --  0.11  --   --   --   --  0.12   LACTATE  --   --   --   --   --   --   --   --  0.9  --   --   --   --  1.2   D DIMER QUANT  --   --   --   --   --   --   --  1.91*  --   --   --   --   --   --    HSTROP T  --   --   --   --   --   --   --   --   --   --   --   --  115* 142*    < > = values in this interval not displayed.         Lab 07/25/25  1040 07/25/25  0402 07/25/25  0338 07/24/25  1406 07/24/25  0753 07/23/25  1545 07/23/25  1207 07/23/25  0834 07/22/25  2331 07/22/25  2220 07/22/25  0944 07/22/25  0313 07/21/25  2212 07/21/25  1905 07/21/25  1432   SODIUM 138 137  137 138 140 139   < > 143 143   < > 145  145   < > 149*   < > 149* 152*   POTASSIUM  --   --   --   --  4.1  --  4.6 4.8  --  3.1*  --  3.2*  --  3.5 3.7   CHLORIDE  --   --   --   --  109*  --   --  114*  --  117*  --  119*  --  117* 118*   CO2  --   --   --   --  20.0*  --   --  22.1  --  21.0*  --  24.0  --  26.0 24.8   ANION GAP  --   --   --   --  10.0  --   --  6.9  --  7.0  --  6.0  --  6.0 9.2   BUN  --   --   --   --  8.5  --   --  8.7  --  8.9  --  12.0  --  14.4 15.1   CREATININE  --   --   --   --  0.74*  --   --  0.80  --  0.67*  --  0.74*  --  0.76 0.81   EGFR  --   --   --   --  93.9  --   --  91.7  --  96.8  --  93.9  --  93.2 91.4   GLUCOSE  --   --   --   --  78  --   --  86  --  101*  --  90  --  96 97   CALCIUM  --   --   --   --  8.4*  --   --  8.3*  --  7.8*  --  7.9*  --  8.0* 8.7   MAGNESIUM  --   --   --   --   --   --   --   --   --  2.2  --   --   --   --  2.5*   TSH  --   --   --   --   --   --   --   --   --   --   --   --   --   --  17.600*    < > = values in this interval not displayed.         Lab 07/23/25  0834 07/22/25  2220 07/21/25  1062   TOTAL  PROTEIN 5.1* 4.4* 5.2*   ALBUMIN 2.6* 2.3* 2.7*   GLOBULIN 2.5 2.1 2.5   ALT (SGPT) 30 30 41   AST (SGOT) 33 35 45*   BILIRUBIN 0.8 0.6 0.6   ALK PHOS 61 51 61         Lab 07/21/25  1628 07/21/25  1432   PROBNP  --  345.0   HSTROP T 115* 142*             Lab 07/21/25  2059 07/21/25  1905   IRON 17* 14*   IRON SATURATION (TSAT)  --  7*   TIBC  --  188*   TRANSFERRIN  --  126*   FERRITIN  --  248.00   FOLATE 4.19*  --    VITAMIN B 12 1,385*  --    ABO TYPING O  --    RH TYPING Positive  --    ANTIBODY SCREEN Negative  --          Lab 07/23/25  0038   PH, ARTERIAL 7.452*   PCO2, ARTERIAL 36.5   PO2 ART 51.4*   FIO2 44   HCO3 ART 25.4   BASE EXCESS ART 1.5   CARBOXYHEMOGLOBIN 1.7     Brief Urine Lab Results  (Last result in the past 365 days)        Color   Clarity   Blood   Leuk Est   Nitrite   Protein   CREAT   Urine HCG        07/21/25 1439 Yellow   Clear   Negative   Trace   Negative   Trace                   Microbiology Results Abnormal       None            No radiology results from the last 24 hrs          Current medications:  Scheduled Meds:levothyroxine, 125 mcg, Oral, Q AM  memantine, 10 mg, Oral, Q12H  mirtazapine, 15 mg, Oral, Nightly  OLANZapine zydis, 5 mg, Oral, Nightly  piperacillin-tazobactam, 3.375 g, Intravenous, Q8H  senna-docusate sodium, 2 tablet, Oral, BID  sodium chloride, 500 mL, Intravenous, Once  sodium chloride, 10 mL, Intravenous, Q12H      Continuous Infusions:dextrose, 50 mL/hr, Last Rate: 50 mL/hr (07/24/25 1809)        PRN Meds:.  acetaminophen **OR** acetaminophen **OR** acetaminophen    senna-docusate sodium **AND** polyethylene glycol **AND** bisacodyl **AND** bisacodyl    Calcium Replacement - Follow Nurse / BPA Driven Protocol    dextrose    dextrose    glucagon (human recombinant)    ipratropium-albuterol    Magnesium Standard Dose Replacement - Follow Nurse / BPA Driven Protocol    nitroglycerin    Phosphorus Replacement - Follow Nurse / BPA Driven Protocol    Potassium  Replacement - Follow Nurse / BPA Driven Protocol    [COMPLETED] Insert Peripheral IV **AND** sodium chloride    [COMPLETED] Insert Peripheral IV **AND** sodium chloride    sodium chloride    sodium chloride    Assessment & Plan   Assessment & Plan     Active Hospital Problems    Diagnosis  POA    **Hypernatremia [E87.0]  Yes    Alzheimer dementia [G30.9, F02.80]  Yes    Hypocalcemia [E83.51]  Yes    Bacteriuria [R82.71]  Yes    Macrocytic anemia [D53.9]  Yes    Hypothyroidism (acquired) [E03.9]  Yes    History of pulmonary embolism [Z86.711]  Unknown      Resolved Hospital Problems   No resolved problems to display.        Brief Hospital Course to date:  Maxim Henson is a 74 y.o. male with history of dementia, aphasia, hypothyroidism, prior PE/DVT here s/p falls. He was found to have chest wall hematoma.  Overnight, pt had acute respiratory failure and is now on Bipap and unable to wean.  Family has decided on DNR/DNI and are contemplating hospice/comfort measures.    Acute Respiratory Failure  Suspect Aspiration PNA  -- required Bipap, weaned to 3L BNC  -- continue empiric ABX for possible aspiration  -- cannot r/o PE due to being unable to tolerate CTA, however, unable to add anticoagulation back due to below. Low suspicion, though, as he was anticoagulated up until admission.     Hypernatremia  -volume depletion, s/p IVFs    Falls  -with bruising  -R pectoralis muscle hematoma  -weakness, frequent hypotension noted on prior cardiology/neurology notes  -likely should DC further AC use    Anemia  -PRBC x 2 ordered/given   -possibly due to falls/hematomas  -monitor GI output given below    Stercoral proctitis  -bowel regimen    Advanced Alzheimer's  -progressive aphasia per neurology notes  -vascular Parkinsonian features as well  -fall risk  -continue home meds as able    Dysphagia  -- wife was agreeable to diet yesterday when I talked to her, understood the risks. Apparently, after Palliative discussion with her,  unclear goal with PO intake, so SLP has been consulted. Note that pt is NPO until wife can be clear with her goals.  -- given gentle IVFs (dextrose) yesterday due to low FSBS    Hypothyroidism  -synthroid    Bacteruria  -urine culture not sent on admission, but pt is now Zosyn, continue    GOC  -- consulted Hospice. Discussed with family and they are open to this discussion   -- DNR/DNI, however, wife did mention that blood transfusions were okay on 7/22      Hx of PE/DVT  -hold xarelto        Expected Discharge Location and Transportation: home with home hospice  Expected Discharge   Expected Discharge Date: 7/26/2025; Expected Discharge Time:      VTE Prophylaxis:  Pharmacologic & mechanical VTE prophylaxis orders are present.         AM-PAC 6 Clicks Score (PT): 6 (07/24/25 1935)    CODE STATUS:   Code Status and Medical Interventions: No CPR (Do Not Attempt to Resuscitate); Limited Support; No intubation (DNI), No artificial nutrition, No dialysis   Ordered at: 07/22/25 1216     Code Status (Patient has no pulse and is not breathing):    No CPR (Do Not Attempt to Resuscitate)     Medical Interventions (Patient has pulse or is breathing):    Limited Support     Medical Intervention Limits:    No intubation (DNI)       No artificial nutrition       No dialysis     Level Of Support Discussed With:    Next of Kin (If No Surrogate)       Health Care Surrogate       Queta Larose MD  07/25/25

## 2025-07-25 NOTE — PROGRESS NOTES
"Continued Stay Note  [facility]     Patient Name: Maxim Henson  MRN: [id number]  Today's Date: 7/25/2025    Admit Date: 7/21/2025    Plan: Long Term Care with hospice   Discharge Plan       Row Name 07/25/25 1226       Plan    Plan Long Term Care with hospice    Plan Comments Telephone call from Nena CALDERÓN, palliative care, stating has met with Dr. Hernandez with pt's wife and daughter, family  has decided on long term car with hospice. Visit made pt appeared to be sleeping, pt's wife and daughter remain in the room. Wife reiterated has decided for pt to go to long term care with hospice. Daughter stated does not \"know much\" about hospice. Teaching done on hospice services at long term care, discussed team visits, medications, supplies, equipment. Wife stated first choices for long term care is in [place] or [facility] Informed family the  will work with family on finding placement for pt., and depending on where pt goes for long term care will determine which hospice will follow pt. Will continue to follow. Please call [phone] if can be of further assistance.      Row Name 07/25/25 1207       Plan    Plan     Patient/Family in Agreement with Plan     Plan Comments     Final Discharge Disposition Code                    Discharge Codes    No documentation.                 Expected Discharge Date and Time       Expected Discharge Date Expected Discharge Time    Jul 26, 2025               Ana Ferrell RN    "

## 2025-07-25 NOTE — PLAN OF CARE
Goal Outcome Evaluation:  Plan of Care Reviewed With: spouse, child        Progress: no change  Outcome Evaluation: Pt seen at 1035 with Dr. PARUL Hernandez; pt's spouse and her daughter present. Pt sleeping throughout encounter, no observable signs of discomfort. Discussion with family about ES Dementia, loss of appetite, difficulty swallowing, sleeping more and becoming more immobile; all of which pt is exhibiting. Repeat SLP evaluation, as pt had suspected aspiration event after diet initiated a few days ago and has been NPO since; recommend safest diet. D/w family that feeding pt when he is very drowsy increases risk of aspiration, and to not try to force feeding if pt turns his head away or otherwise exhibits desire to not eat. Family verbalized agreement to LTC placement with Hospice services to follow at facility. Hospice Liaison notified and came to the room to meet with family. CM notified and provided facility list for family to review and select preferred options. Palliative following for continued support in ongoing GOC/POC discussion.     0930: Palliative IDT discussion: MD, TATYANA, RN,   After hours, weekends and holidays, contact Palliative Provider by calling [phone]       Problem: Palliative Care  Goal: Enhanced Quality of Life  Outcome: Progressing  Intervention: Maximize Comfort  Flowsheets (Taken 7/25/2025 1340)  Pain Management Interventions: pain management plan reviewed with patient/caregiver  Intervention: Optimize Function  Flowsheets (Taken 7/25/2025 1340)  Sensory Stimulation Regulation: quiet environment promoted  Sleep/Rest Enhancement:   natural light exposure provided   family presence promoted  Intervention: Promote Advance Care Planning  Flowsheets (Taken 7/25/2025 1340)  Life Transition/Adjustment: (Hospice Liaison met with pt's spouse and her daughter) other (see comments)  Intervention: Optimize Psychosocial Wellbeing  Flowsheets (Taken 7/25/2025 1340)  Supportive  Measures:   active listening utilized   decision-making supported   positive reinforcement provided   verbalization of feelings encouraged  Grieving Process Facilitation: reaction to loss explored  Family/Support System Care:   caregiver stress acknowledged   family care conference arranged   involvement promoted   presence promoted   self-care encouraged   support provided

## 2025-07-25 NOTE — PROGRESS NOTES
"Palliative Care Daily Progress Note     Referring: Radha Yarbrough    C/C: none per pt.    S: Medical record reviewed. Seen earlier today. Events noted. Made NPO for possible aspiration with decrease Ow sats.  Much improved O2 status but not very responsive/interactive.  Spouse and spouse dtr bdeside.      ROS:   Pt unable    O: Code Status:   Code Status and Medical Interventions: No CPR (Do Not Attempt to Resuscitate); Limited Support; No intubation (DNI), No artificial nutrition, No dialysis   Ordered at: 07/22/25 1216     Code Status (Patient has no pulse and is not breathing):    No CPR (Do Not Attempt to Resuscitate)     Medical Interventions (Patient has pulse or is breathing):    Limited Support     Medical Intervention Limits:    No intubation (DNI)       No artificial nutrition       No dialysis     Level Of Support Discussed With:    Next of Kin (If No Surrogate)       Health Care Surrogate      Advanced Directives: Advance Directive Status: Patient has advance directive, copy requested   Goals of Care: Ongoing.   Palliative Performance Scale Score: 20%     /62 (BP Location: Right arm, Patient Position: Lying)   Pulse 68   Temp 98.1 °F (36.7 °C) (Oral)   Resp 18   Ht 177.8 cm (70\")   Wt 64.9 kg (143 lb)   SpO2 99%   BMI 20.52 kg/m²   No intake or output data in the 24 hours ending 07/25/25 1214    Physical Exam:    General Appearance:    Did not respond to exam, NAD   HEENT:    NC/AT, EOMI, anicteric, MMM, face relaxed   Neck:   supple, trachea midline, no JVD   Lungs:     Mild rhonchi RUL anteriorly, diminished in bases; respirations regular, even and unlabored    Heart:    RRR, normal S1 and S2, no M/R/G   Abdomen:     Normal bowel sounds, soft, nontender, nondistended   G/U:   Deferred   MSK/Extremities:   No clubbing , cyanosis or edema, No wasting   Pulses:   Pulses palpable and equal bilaterally   Skin:   Warm, dry, no mottling, pectoral hematoma improved   Neurologic:   Not responsive " to exam. no tremor, nl tone   Psych:   Calm       Current Medications:      Current Facility-Administered Medications:     acetaminophen (TYLENOL) tablet 650 mg, 650 mg, Oral, Q4H PRN, 650 mg at 07/22/25 2055 **OR** acetaminophen (TYLENOL) 160 MG/5ML oral solution 650 mg, 650 mg, Oral, Q4H PRN **OR** acetaminophen (TYLENOL) suppository 650 mg, 650 mg, Rectal, Q4H PRN, Kelly, Elizabeth, APRN, 650 mg at 07/23/25 2023    sennosides-docusate (PERICOLACE) 8.6-50 MG per tablet 2 tablet, 2 tablet, Oral, BID, 2 tablet at 07/22/25 1026 **AND** polyethylene glycol (MIRALAX) packet 17 g, 17 g, Oral, Daily PRN **AND** bisacodyl (DULCOLAX) EC tablet 5 mg, 5 mg, Oral, Daily PRN **AND** bisacodyl (DULCOLAX) suppository 10 mg, 10 mg, Rectal, Daily PRN, Kelly, Elizabeth, APRN    Calcium Replacement - Follow Nurse / BPA Driven Protocol, , Not Applicable, PRN, Anisha Recio APRN    dextrose (D50W) (25 g/50 mL) IV injection 25 g, 25 g, Intravenous, Q15 Min PRN, Radha Yarbrough MD, 25 g at 07/24/25 1730    dextrose (D5W) 5 % infusion, 50 mL/hr, Intravenous, Continuous, Queta Larose MD, Last Rate: 50 mL/hr at 07/24/25 1809, 50 mL/hr at 07/24/25 1809    dextrose (GLUTOSE) oral gel 15 g, 15 g, Oral, Q15 Min PRN, Radha Yarbrough MD    glucagon (GLUCAGEN) injection 1 mg, 1 mg, Intramuscular, Q15 Min PRN, Radha Yarbrough MD    ipratropium-albuterol (DUO-NEB) nebulizer solution 3 mL, 3 mL, Nebulization, Q4H PRN, Maxim Zaragoza MD    levothyroxine (SYNTHROID, LEVOTHROID) tablet 125 mcg, 125 mcg, Oral, Q AM, Kelly, Elizabeth, APRN    Magnesium Standard Dose Replacement - Follow Nurse / BPA Driven Protocol, , Not Applicable, PRN, Anisha Recio R, APRN    memantine (NAMENDA) tablet 10 mg, 10 mg, Oral, Q12H, Kelly, Elizabeth, APRN, 10 mg at 07/22/25 2032    mirtazapine (REMERON) tablet 15 mg, 15 mg, Oral, Nightly, Kelly, Elizabeth, APRN, 15 mg at 07/22/25 2032    nitroglycerin (NITROSTAT) SL tablet 0.4 mg, 0.4 mg,  Sublingual, Q5 Min PRN, Kelly, Elizabeth, APRN    OLANZapine zydis (zyPREXA) disintegrating tablet 5 mg, 5 mg, Oral, Nightly, Kelly, Elizabeth, APRN, 5 mg at 07/22/25 2032    Phosphorus Replacement - Follow Nurse / BPA Driven Protocol, , Not Applicable, PRN, Anisha Recio, APRN    piperacillin-tazobactam (ZOSYN) 3.375 g IVPB in 100 mL NS MBP (CD), 3.375 g, Intravenous, Q8H, Maxim Zaragoza MD, 3.375 g at 07/25/25 0814    Potassium Replacement - Follow Nurse / BPA Driven Protocol, , Not Applicable, PRN, Anisha Recio, APRN    sodium chloride 0.9 % bolus 500 mL, 500 mL, Intravenous, Once, Radha Yarbrough MD, Stopped at 07/21/25 2140    [COMPLETED] Insert Peripheral IV, , , Once **AND** sodium chloride 0.9 % flush 10 mL, 10 mL, Intravenous, PRN, [place], Farheen V, APRN    [COMPLETED] Insert Peripheral IV, , , Once **AND** sodium chloride 0.9 % flush 10 mL, 10 mL, Intravenous, PRN, [place], Farheen V, APRN    sodium chloride 0.9 % flush 10 mL, 10 mL, Intravenous, Q12H, Kelly, Elizabeth, APRN, 10 mL at 07/24/25 0824    sodium chloride 0.9 % flush 10 mL, 10 mL, Intravenous, PRN, Kelly, Elizabeth, APRN    sodium chloride 0.9 % infusion 40 mL, 40 mL, Intravenous, PRN, Kelly, Elizabeth, APRN     Labs:   Results from last 7 days   Lab Units 07/25/25  1040 07/24/25  1405 07/24/25  0753   WBC 10*3/mm3  --   --  9.18   HEMOGLOBIN g/dL 9.5*   < > 11.1*   HEMATOCRIT % 29.7*   < > 34.2*   PLATELETS 10*3/mm3  --   --  251    < > = values in this interval not displayed.     Results from last 7 days   Lab Units 07/25/25  1040 07/24/25  1406 07/24/25  0753 07/23/25  1207 07/23/25  0834   SODIUM mmol/L 138   < > 139   < > 143   POTASSIUM mmol/L  --   --  4.1   < > 4.8   CHLORIDE mmol/L  --   --  109*  --  114*   CO2 mmol/L  --   --  20.0*  --  22.1   BUN mg/dL  --   --  8.5  --  8.7   CREATININE mg/dL  --   --  0.74*  --  0.80   CALCIUM mg/dL  --   --  8.4*  --  8.3*   BILIRUBIN mg/dL  --   --   --   --  0.8   ALK PHOS U/L  --   --    --   --  61   ALT (SGPT) U/L  --   --   --   --  30   AST (SGOT) U/L  --   --   --   --  33   GLUCOSE mg/dL  --   --  78  --  86    < > = values in this interval not displayed.     Imaging Results (Last 72 Hours)       Procedure Component Value Units Date/Time    XR Chest 1 View [[id number]] Collected: 07/22/25 2359     Updated: 07/23/25 0004    Narrative:      XR CHEST 1 VW    Date of Exam: 7/22/2025 11:16 PM EDT    Indication: dyspnea    Comparison: CT chest 7/21/2025.    Findings:  There is persistent patchy airspace disease versus atelectasis in the right lung base. Increased density over the right lateral chest, likely related to known pectoralis hematoma. There is new partial atelectasis in the left lung base. No pneumothorax or   definite pleural effusion. No acute osseous abnormality. Heart size and pulmonary vasculature appear within normal limits.      Impression:      Impression:  1.Persistent patchy airspace disease versus atelectasis in the right lung base.  2.New partial atelectasis in the left lung base.          Electronically Signed: Jd Pendleton MD    7/23/2025 12:01 AM EDT    Workstation ID: [id number]                  Diagnostics: Reviewed    A:     Hypernatremia    Alzheimer dementia    Hypocalcemia    Bacteriuria    Macrocytic anemia    Hypothyroidism (acquired)    History of pulmonary embolism       Impression:  R pectoral hematoma  Alzheimer's dementia with agitation  Stercoral proctitis  Hypernatremia  Afib  Gout hx  Hypoalbuminemia - 2.7  Hx psoriatic arthritis    Symptoms:   AMS  Debility    P:   Met with spouse and her dtr in room.  Reviewed alzheimer's dx and routine progression.  Reviewed dysphagia and expected outcomes.  Introduced comfort care and hospice approach.  Spouse again reported they would not want tube feeding.  Did want speech to reeval to see iff rec diet remains or if would only be a comfort diet.  They are interested in long term care as when discussed level of care pt  would require at home spouse is able to admit she cannot provide.  CM updated.  Hospice liaison updated and to meet with spouse this AM rather than PM.  Palliative Care Team will continue to follow patient.     Bijal Hernandez MD, 7/25/2025, 12:14 EDT

## 2025-07-25 NOTE — DISCHARGE PLACEMENT REQUEST
"Elie Roy (74 y.o. Male)     Elisa -[phone]      Date of Birth   1950    Social Security Number       Address   [address] [place] [address]    Home Phone   [phone]    MRN   [id number]       Jain   None    Marital Status                               Admission Date   7/21/2025    Admission Type   Emergency    Admitting Provider   Queta Larose MD    Attending Provider   Queta Larose MD    Department, Room/Bed   [facility] 3F, S325/1       Discharge Date       Discharge Disposition       Discharge Destination                                 Attending Provider: Queta Larose MD    Allergies: Cefdinir, Sulfamethoxazole-trimethoprim    Isolation: None   Infection: None   Code Status: No CPR    Ht: 177.8 cm (70\")   Wt: 64.9 kg (143 lb)    Admission Cmt: None   Principal Problem: Hypernatremia [E87.0]                   Active Insurance as of 7/21/2025       Primary Coverage       Payor Plan Insurance Group Employer/Plan Group    [employer] MEDICARE REPLACEMENT U[employer] Medicare Advantage GROUP PPO [id number]       Payor Plan Address Payor Plan Phone Number Payor Plan Fax Number Effective Dates    [address] [address]   1/1/2025 - None Entered    [facility] [id number]         Subscriber Name Subscriber Birth Date Member ID       ELIE ROY 1950 [id number]                     Emergency Contacts        (Rel.) Home Phone Work Phone Mobile Phone    WALDO ROY (Spouse) [phone] -- [phone]              Problem List           Codes Noted - Resolved       Hospital    * (Principal) Hypernatremia ICD-10-CM: E87.0  ICD-9-CM: 276.0 7/21/2025 - Present    Alzheimer dementia ICD-10-CM: G30.9, F02.80  ICD-9-CM: 331.0, 294.10 7/21/2025 - Present    Hypocalcemia ICD-10-CM: E83.51  ICD-9-CM: 275.41 7/21/2025 - Present    Bacteriuria ICD-10-CM: R82.71  ICD-9-CM: 791.9 7/21/2025 - Present    Macrocytic anemia ICD-10-CM: " "D53.9  ICD-9-CM: 281.9 2025 - Present    Hypothyroidism (acquired) ICD-10-CM: E03.9  ICD-9-CM: 244.9 2025 - Present    History of pulmonary embolism ICD-10-CM: Z86.711  ICD-9-CM: V12.55 2025 - Present        History & Physical        Elizabeth Mendoza APRN at 25       Attestation signed by Radha Yarbrough MD at 25      I have reviewed this documentation and agree. We will monitor sodium levels and adjust maintenance fluids. Overall poor prognosis with advanced dementia. Per undersigned NP, family is willing to meet with Palliative Care which I feel is appropriate at this time.                           [facility] Medicine Services  HISTORY AND PHYSICAL    Patient Name: Maxim Henson  : 1950  MRN: [id number]  Primary Care Physician: Angel Preston MD  Date of admission: 2025    Subjective  Subjective     Chief Complaint:  Frequent falls, lower leg edema    HPI:  Maxim Henson is a 74 y.o. male with a past medical history significant for alzheimer dementia with agitation, atrial fibrillation, pulmonary embolism and gout presents to the ED from [facility] nursing facility due to frequent falls and lower extremity edema.  Patient has no family at bedside and has severe dementia therefore HPI is limited.  Per report patient is wheelchair bound and tries to get up frequently and falls.  Spoke to Wife by phone and she reports patient has been at [facility] for about three weeks now.  She had taken care of him for about a year which was difficult.  About a week ago she reports patient was taken to the ED for a fall and ultimately suffered a head laceration needing staples.  Today she received a phone call from staff saying the patient had fallen and had a \"big bruise to his chest.\"          Review of Systems   Unable to perform ROS: Dementia                Personal History     Past Medical History:   Diagnosis Date    Alzheimer dementia with " agitation     Anxiety     Atrial fibrillation     Dementia     Gout              No past surgical history on file.    Family History:  family history is not on file.     Social History:    Social History     Social History Narrative    Not on file       Medications:  OLANZapine zydis, levothyroxine, memantine, mirtazapine, rivaroxaban, and sodium-potassium-magnesium sulfates    Allergies   Allergen Reactions    Cefdinir Other (See Comments)     Unknown    Sulfamethoxazole-Trimethoprim Other (See Comments)     Unknown       Objective  Objective     Vital Signs:   Temp:  [99.1 °F (37.3 °C)] 99.1 °F (37.3 °C)  Heart Rate:  [73-96] 96  Resp:  [18] 18  BP: (104-144)/(56-99) 144/99    Physical Exam  Vitals and nursing note reviewed.   Constitutional:       General: He is not in acute distress.     Appearance: Normal appearance. He is not ill-appearing, toxic-appearing or diaphoretic.   HENT:      Head: Normocephalic and atraumatic.      Nose: Nose normal.      Mouth/Throat:      Mouth: Mucous membranes are dry.      Pharynx: Oropharynx is clear.   Eyes:      Extraocular Movements: Extraocular movements intact.      Conjunctiva/sclera: Conjunctivae normal.      Pupils: Pupils are equal, round, and reactive to light.   Cardiovascular:      Rate and Rhythm: Normal rate and regular rhythm.      Pulses: Normal pulses.      Heart sounds: Normal heart sounds.   Pulmonary:      Effort: Pulmonary effort is normal.      Breath sounds: Normal breath sounds.   Abdominal:      General: Bowel sounds are normal. There is no distension.      Palpations: Abdomen is soft. There is no mass.      Tenderness: There is no abdominal tenderness. There is no right CVA tenderness, left CVA tenderness, guarding or rebound.      Hernia: No hernia is present.   Musculoskeletal:         General: No swelling, tenderness, deformity or signs of injury.      Cervical back: Normal range of motion and neck supple.      Right lower leg: Edema present.       Left lower leg: Edema present.   Skin:     General: Skin is warm and dry.      Comments: Scattered bruising to arms, legs and hematoma to chest.  Abrasions to bilateral arms.    Neurological:      Mental Status: He is alert. Mental status is at baseline.   Psychiatric:      Comments: Non-communicative             Result Review:  I have personally reviewed the results from the time of this admission to 7/21/2025 19:58 EDT and agree with these findings:  [x]  Laboratory list / accordion  [x]  Microbiology  [x]  Radiology  []  EKG/Telemetry   []  Cardiology/Vascular   []  Pathology  []  Old records  []  Other:  Most notable findings include:     LAB RESULTS:      Lab 07/21/25  1432   WBC 8.35   HEMOGLOBIN 8.4*   HEMATOCRIT 27.0*   PLATELETS 244   NEUTROS ABS 6.68   IMMATURE GRANS (ABS) 0.04   LYMPHS ABS 0.68*   MONOS ABS 0.85   EOS ABS 0.05   .3*   CRP 6.42*   PROCALCITONIN 0.12   LACTATE 1.2   CK TOTAL 702*         Lab 07/21/25  1905 07/21/25  1432   SODIUM 149* 152*   POTASSIUM 3.5 3.7   CHLORIDE 117* 118*   CO2 26.0 24.8   ANION GAP 6.0 9.2   BUN 14.4 15.1   CREATININE 0.76 0.81   EGFR 93.2 91.4   GLUCOSE 96 97   CALCIUM 8.0* 8.7   MAGNESIUM  --  2.5*   TSH  --  17.600*         Lab 07/21/25  1432   TOTAL PROTEIN 5.2*   ALBUMIN 2.7*   GLOBULIN 2.5   ALT (SGPT) 41   AST (SGOT) 45*   BILIRUBIN 0.6   ALK PHOS 61         Lab 07/21/25  1628 07/21/25  1432   PROBNP  --  345.0   HSTROP T 115* 142*                 Brief Urine Lab Results  (Last result in the past 365 days)        Color   Clarity   Blood   Leuk Est   Nitrite   Protein   CREAT   Urine HCG        07/21/25 1439 Yellow   Clear   Negative   Trace   Negative   Trace                 Microbiology Results (last 10 days)       Procedure Component Value - Date/Time    Respiratory Panel PCR w/COVID-19(SARS-CoV-2) MICHAEL/RICKY/QASIM/PAD/COR/FRANNIE In-House, NP Swab in UTM/VTM, 2 HR TAT - Swab, Nasopharynx [[id number]]  (Normal) Collected: 07/21/25 1504    Lab Status: Final  result Specimen: Swab from Nasopharynx Updated: 07/21/25 1604     ADENOVIRUS, PCR Not Detected     Coronavirus 229E Not Detected     Coronavirus HKU1 Not Detected     Coronavirus NL63 Not Detected     Coronavirus OC43 Not Detected     COVID19 Not Detected     Human Metapneumovirus Not Detected     Human Rhinovirus/Enterovirus Not Detected     Influenza A PCR Not Detected     Influenza B PCR Not Detected     Parainfluenza Virus 1 Not Detected     Parainfluenza Virus 2 Not Detected     Parainfluenza Virus 3 Not Detected     Parainfluenza Virus 4 Not Detected     RSV, PCR Not Detected     Bordetella pertussis pcr Not Detected     Bordetella parapertussis PCR Not Detected     Chlamydophila pneumoniae PCR Not Detected     Mycoplasma pneumo by PCR Not Detected    Narrative:      In the setting of a positive respiratory panel with a viral infection PLUS a negative procalcitonin without other underlying concern for bacterial infection, consider observing off antibiotics or discontinuation of antibiotics and continue supportive care. If the respiratory panel is positive for atypical bacterial infection (Bordetella pertussis, Chlamydophila pneumoniae, or Mycoplasma pneumoniae), consider antibiotic de-escalation to target atypical bacterial infection.            CT Chest Without Contrast Diagnostic  Result Date: 7/21/2025  CT CHEST WO CONTRAST DIAGNOSTIC, CT ABDOMEN PELVIS WO CONTRAST Date of Exam: 7/21/2025 3:16 PM EDT Indication: Anterior chest bruising, frequent falls. Comparison: None available. Technique: Axial CT images were obtained of the chest abdomen and pelvis without contrast administration.  Reconstructed coronal and sagittal images were also obtained. Automated exposure control and iterative construction methods were used. Findings: CT CHEST: MEDIASTINUM: There is a moderate size sliding hiatal hernia. There are aortic valvular calcifications aortic and heart size are normal. No mass nor pericardial effusion.  CORONARY ARTERIES: There is calcified atherosclerotic disease. LUNGS: There is minimal right basal airspace disease, likely atelectasis. No additional consolidation. No significant nodule nor interstitial changes. PLEURAL SPACE: Trace right pleural effusion. LYMPH NODES: No pathologically enlarged thoracic nodes. There is a heterogeneous mass centered within the right pectoralis major muscle which is only partially visualized given field-of-view. This measures at least 15 cm in transverse dimension by 5 cm in AP dimension. This likely represents an intramuscular hematoma. There is surrounding subcutaneous edema/ecchymosis. Correlate with history as neoplasm not excluded. CT ABDOMEN AND PELVIS:  LIVER:  Unremarkable parenchyma without focal lesion. BILIARY/GALLBLADDER:  Unremarkable SPLEEN:  Unremarkable PANCREAS:  Unremarkable ADRENAL:  Unremarkable KIDNEYS:  Unremarkable parenchyma with no solid mass identified. No obstruction.  No calculus identified. GASTROINTESTINAL/MESENTERY: There is mural thickening with moderate fecal load involving the rectum. There is associated perirectal/presacral edema. 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 Correlate with history and symptoms. AORTA/IVC: IVC filter is present on imaging study. It is recommended that all patients with IVC filters in place have an active management care plan to monitor their IVC filter. If a care plan is not in place, patient should have a non emergent referral to an interventional specialist such as interventional radiology or other interventional vascular specialist for establishment of an IVC filter management care plan. RETROPERITONEUM/LYMPH NODES:  Unremarkable REPRODUCTIVE:  Unremarkable BLADDER:  Unremarkable OSSEUS STRUCTURES:  Typical for age with no acute process identified. There is right anterior lateral abdominal wall subcutaneous edema versus ecchymosis.     Impression: Impression: 1.There is a heterogeneous mass centered within  the right pectoralis major muscle which is only partially visualized given field-of-view. This measures at least 15 cm in transverse dimension by 5 cm in AP dimension. This likely represents an intramuscular hematoma. There is surrounding subcutaneous edema/ecchymosis. Correlate with history as neoplasm not excluded. 2.There is mural thickening with moderate fecal load involving the rectum. There is associated perirectal/presacral edema. 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 Correlate with history and symptoms. 3.There is right anterior lateral abdominal wall subcutaneous edema versus ecchymosis. 4.Trace right pleural effusion. 5.Moderate size sliding hiatal hernia. Electronically Signed: Clark Espinoza MD  7/21/2025 3:48 PM EDT  Workstation ID: [id number]    CT Abdomen Pelvis Without Contrast  Result Date: 7/21/2025  CT CHEST WO CONTRAST DIAGNOSTIC, CT ABDOMEN PELVIS WO CONTRAST Date of Exam: 7/21/2025 3:16 PM EDT Indication: Anterior chest bruising, frequent falls. Comparison: None available. Technique: Axial CT images were obtained of the chest abdomen and pelvis without contrast administration.  Reconstructed coronal and sagittal images were also obtained. Automated exposure control and iterative construction methods were used. Findings: CT CHEST: MEDIASTINUM: There is a moderate size sliding hiatal hernia. There are aortic valvular calcifications aortic and heart size are normal. No mass nor pericardial effusion. CORONARY ARTERIES: There is calcified atherosclerotic disease. LUNGS: There is minimal right basal airspace disease, likely atelectasis. No additional consolidation. No significant nodule nor interstitial changes. PLEURAL SPACE: Trace right pleural effusion. LYMPH NODES: No pathologically enlarged thoracic nodes. There is a heterogeneous mass centered within the right pectoralis major muscle which is only partially visualized given field-of-view. This measures at least 15 cm in transverse  dimension by 5 cm in AP dimension. This likely represents an intramuscular hematoma. There is surrounding subcutaneous edema/ecchymosis. Correlate with history as neoplasm not excluded. CT ABDOMEN AND PELVIS:  LIVER:  Unremarkable parenchyma without focal lesion. BILIARY/GALLBLADDER:  Unremarkable SPLEEN:  Unremarkable PANCREAS:  Unremarkable ADRENAL:  Unremarkable KIDNEYS:  Unremarkable parenchyma with no solid mass identified. No obstruction.  No calculus identified. GASTROINTESTINAL/MESENTERY: There is mural thickening with moderate fecal load involving the rectum. There is associated perirectal/presacral edema. 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 Correlate with history and symptoms. AORTA/IVC: IVC filter is present on imaging study. It is recommended that all patients with IVC filters in place have an active management care plan to monitor their IVC filter. If a care plan is not in place, patient should have a non emergent referral to an interventional specialist such as interventional radiology or other interventional vascular specialist for establishment of an IVC filter management care plan. RETROPERITONEUM/LYMPH NODES:  Unremarkable REPRODUCTIVE:  Unremarkable BLADDER:  Unremarkable OSSEUS STRUCTURES:  Typical for age with no acute process identified. There is right anterior lateral abdominal wall subcutaneous edema versus ecchymosis.     Impression: Impression: 1.There is a heterogeneous mass centered within the right pectoralis major muscle which is only partially visualized given field-of-view. This measures at least 15 cm in transverse dimension by 5 cm in AP dimension. This likely represents an intramuscular hematoma. There is surrounding subcutaneous edema/ecchymosis. Correlate with history as neoplasm not excluded. 2.There is mural thickening with moderate fecal load involving the rectum. There is associated perirectal/presacral edema. 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  Correlate with history and symptoms. 3.There is right anterior lateral abdominal wall subcutaneous edema versus ecchymosis. 4.Trace right pleural effusion. 5.Moderate size sliding hiatal hernia. Electronically Signed: Clark Espinoza MD  7/21/2025 3:48 PM EDT  Workstation ID: [id number]    CT Head Without Contrast  Result Date: 7/21/2025  CT HEAD WO CONTRAST Date of Exam: 7/21/2025 3:16 PM EDT Indication: frequent falls. Comparison: 7/9/2025 Technique: Axial CT images were obtained of the head without contrast administration.  Automated exposure control and iterative construction methods were used. Findings: There is no evidence of acute territorial infarction. There is no acute intracranial hemorrhage. There are no extra-axial collections. Ventricles and CSF spaces are symmetrically prominent. No mass effect nor hydrocephalus. Brain parenchyma appears unchanged.  Paranasal sinuses and mastoid air cells are adequately aerated.  Osseous structures and orbits appear intact.     Impression: Impression: 1.No acute intracranial process is identified. 2.Generalized atrophy. Electronically Signed: Clark Espinoza MD  7/21/2025 3:36 PM EDT  Workstation ID: [id number]    XR Chest 1 View  Result Date: 7/21/2025  XR CHEST 1 VW Date of Exam: 7/21/2025 2:10 PM EDT Indication: Leg edema Comparison: None available. Findings: Normal cardiomediastinal silhouette. The lungs are clear. No pleural effusion or pneumothorax. No acute osseous findings.     Impression: Impression: No acute cardiopulmonary findings. Electronically Signed: Agusto Goodman MD  7/21/2025 2:26 PM EDT  Workstation ID: [id number]          Assessment & Plan  Assessment & Plan       Hypernatremia    Alzheimer dementia    Hypocalcemia    Bacteriuria    Macrocytic anemia    Hypothyroidism (acquired)    History of pulmonary embolism      74 year old male presents to the ED from [facility] after a fall.     1) Hypernatremia  -, 149  -Approximately 1.5L  water deficit   -received 1L normal saline in the ED, receiving 500 ml bolus now  -serial BMP   -TSH 17.600 Free T4 1.21  -CT head as noted above   -strict I &O's   -start D5W IVF, glucose currently 98    2) Frequent Falls       ? Right pectoralis muscle hematoma       Wheelchair bound   -CT chest as noted above  -hold xarelto for now   -trend H&H overnight   -type and screen   -fall precautions     3) Stercoral proctitis  -CT abdomen and pelvis noted above  -mineral oil enema given in the ED  -continue bowel regimen overnight     4) Macrocytic anemia  -H&H 8.4/27.0  -trend overnight   -type and screen   -anemia studies     5) Advanced alzheimers dementia      Malnourished   -at baseline not communicative   -consult nutrition in am   -continue IVF   -consider albumin   -check ionized calcium   -consult palliative in am   -on remeron, namenda, zyprexa     6) Hypothyroidism  -TSH and Free T4 noted above  -continue synthroid     7) Bacteruria  -UA above  -will check urine cultures   -strict I &O's  -check post void residual        DVT prophylaxis:  scds    CODE STATUS:  DNR/DNI, confirmed with Wife by phone        Expected Discharge    This note has been completed as part of a split-shared workflow.     Signature: Electronically signed by TATYANA Wheat, 07/21/25, 8:27 PM EDT.                  Electronically signed by Radha Yarbrough MD at 07/21/25 2147       Current Facility-Administered Medications   Medication Dose Route Frequency Provider Last Rate Last Admin    acetaminophen (TYLENOL) tablet 650 mg  650 mg Oral Q4H PRN Kelly, Elizabeth, APRN   650 mg at 07/22/25 2055    Or    acetaminophen (TYLENOL) 160 MG/5ML oral solution 650 mg  650 mg Oral Q4H PRN Kelly, Elizabeth, APRN        Or    acetaminophen (TYLENOL) suppository 650 mg  650 mg Rectal Q4H PRN Kelly, Elizabeth, APRN   650 mg at 07/23/25 2023    sennosides-docusate (PERICOLACE) 8.6-50 MG per tablet 2 tablet  2 tablet Oral BID Kelly, Elizabeth, APRN    2 tablet at 07/22/25 1026    And    polyethylene glycol (MIRALAX) packet 17 g  17 g Oral Daily PRN Kelly, Elizabeth, APRN        And    bisacodyl (DULCOLAX) EC tablet 5 mg  5 mg Oral Daily PRN Kelly, Elizabeth, APRN        And    bisacodyl (DULCOLAX) suppository 10 mg  10 mg Rectal Daily PRN Kelly, Elizabeth, APRN        Calcium Replacement - Follow Nurse / BPA Driven Protocol   Not Applicable PRN Anisha Recio APRN        dextrose (D50W) (25 g/50 mL) IV injection 25 g  25 g Intravenous Q15 Min PRN Radha Yarbrough MD   25 g at 07/24/25 1730    dextrose (D5W) 5 % infusion  50 mL/hr Intravenous Continuous Queta Larose MD 50 mL/hr at 07/24/25 1809 50 mL/hr at 07/24/25 1809    dextrose (GLUTOSE) oral gel 15 g  15 g Oral Q15 Min PRN Radha Yarbrough MD        glucagon (GLUCAGEN) injection 1 mg  1 mg Intramuscular Q15 Min PRN Radha Yarbrough MD        ipratropium-albuterol (DUO-NEB) nebulizer solution 3 mL  3 mL Nebulization Q4H PRN Maxim Zaragoza MD        levothyroxine (SYNTHROID, LEVOTHROID) tablet 125 mcg  125 mcg Oral Q AM Kelly, Elizabeth, APRN        Magnesium Standard Dose Replacement - Follow Nurse / BPA Driven Protocol   Not Applicable PRN Anisha Recio APRN        memantine (NAMENDA) tablet 10 mg  10 mg Oral Q12H Kelly, Elizabeth, APRN   10 mg at 07/22/25 2032    mirtazapine (REMERON) tablet 15 mg  15 mg Oral Nightly Kelly, Elizabeth, APRN   15 mg at 07/22/25 2032    nitroglycerin (NITROSTAT) SL tablet 0.4 mg  0.4 mg Sublingual Q5 Min PRN Kelly, Elizabeth, APRN        OLANZapine zydis (zyPREXA) disintegrating tablet 5 mg  5 mg Oral Nightly Kelly, Elizabeth, APRN   5 mg at 07/22/25 2032    Phosphorus Replacement - Follow Nurse / BPA Driven Protocol   Not Applicable PRN Recio, Anisha R, APRN        piperacillin-tazobactam (ZOSYN) 3.375 g IVPB in 100 mL NS MBP (CD)  3.375 g Intravenous Q8H Maxim Zaragoza MD   3.375 g at 07/25/25 0814    Potassium Replacement - Follow Nurse / BPA  Driven Protocol   Not Applicable PRN Anisha Recio R, APRN        sodium chloride 0.9 % bolus 500 mL  500 mL Intravenous Once Radha Yarbrough MD   Stopped at 25 2140    sodium chloride 0.9 % flush 10 mL  10 mL Intravenous PRN Pendleton, Farheen V, APRN        sodium chloride 0.9 % flush 10 mL  10 mL Intravenous PRN Pendleton, Farheen V, APRN        sodium chloride 0.9 % flush 10 mL  10 mL Intravenous Q12H Kelly, Elizabeth, APRN   10 mL at 25 0824    sodium chloride 0.9 % flush 10 mL  10 mL Intravenous PRN Kelly, Elizabeth, APRN        sodium chloride 0.9 % infusion 40 mL  40 mL Intravenous PRN Kelly, Elizabeth, APRN            Physician Progress Notes (most recent note)        Queta Larose MD at 25 0807              [facility] Medicine Services  PROGRESS NOTE    Patient Name: Maxim Henson  : 1950  MRN: [id number]    Date of Admission: 2025  Primary Care Physician: Lynn Orozco APRN    Subjective   Subjective     CC: S/P Falls    HPI: Doing okay this am, now on NC. Wife at bedside.       Objective   Objective     Vital Signs:   Temp:  [98 °F (36.7 °C)-100.4 °F (38 °C)] 98.2 °F (36.8 °C)  Heart Rate:  [] 72  Resp:  [16-25] 22  BP: (117-151)/(77-99) 117/77  Flow (L/min) (Oxygen Therapy):  [3] 3     Physical Exam:  Constitutional: non-responsive, on 3L BNC  HENT: NCAT, mucous membranes moist  Respiratory: Clear to auscultation bilaterally, respiratory effort normal   Cardiovascular: RRR, no murmurs, rubs, or gallops  Gastrointestinal: Positive bowel sounds, soft, nontender, nondistended  Musculoskeletal: No bilateral ankle edema  Psychiatric: WOO  Neurologic: WOO  Skin: No rashes, R chest wall hematoma     Results Reviewed:  LAB RESULTS:      Lab 25  2343 25  1753 25  1206 25  0834 25  0037 25  2331 25  0944 25  0313 25  0000 25  1628 25  1432   WBC  --   --   --  9.76  --  6.36  --  5.68   --   --  8.35   HEMOGLOBIN 9.6* 10.0* 9.6* 9.7*  --  8.8*   < > 6.8*   < >  --  8.4*   HEMATOCRIT 29.3* 31.2* 32.0* 30.8*  --  27.5*   < > 22.6*   < >  --  27.0*   PLATELETS  --   --   --  226  --  197  --  205  --   --  244   NEUTROS ABS  --   --   --   --   --  4.76  --  4.08  --   --  6.68   IMMATURE GRANS (ABS)  --   --   --   --   --  0.03  --  0.02  --   --  0.04   LYMPHS ABS  --   --   --   --   --  0.72  --  0.72  --   --  0.68*   MONOS ABS  --   --   --   --   --  0.73  --  0.74  --   --  0.85   EOS ABS  --   --   --   --   --  0.09  --  0.09  --   --  0.05   MCV  --   --   --  96.9  --  97.2*  --  103.7*  --   --  102.3*   CRP  --   --   --   --   --   --   --   --   --   --  6.42*   PROCALCITONIN  --   --   --   --   --  0.11  --   --   --   --  0.12   LACTATE  --   --   --   --   --  0.9  --   --   --   --  1.2   D DIMER QUANT  --   --   --   --  1.91*  --   --   --   --   --   --    HSTROP T  --   --   --   --   --   --   --   --   --  115* 142*    < > = values in this interval not displayed.         Lab 07/23/25  2343 07/23/25  1753 07/23/25  1545 07/23/25  1207 07/23/25  0834 07/22/25  2331 07/22/25  2220 07/22/25  0944 07/22/25  0313 07/21/25  2212 07/21/25  1905 07/21/25  1432   SODIUM 142 144 143 143 143   < > 145  145   < > 149*   < > 149* 152*   POTASSIUM  --   --   --  4.6 4.8  --  3.1*  --  3.2*  --  3.5 3.7   CHLORIDE  --   --   --   --  114*  --  117*  --  119*  --  117* 118*   CO2  --   --   --   --  22.1  --  21.0*  --  24.0  --  26.0 24.8   ANION GAP  --   --   --   --  6.9  --  7.0  --  6.0  --  6.0 9.2   BUN  --   --   --   --  8.7  --  8.9  --  12.0  --  14.4 15.1   CREATININE  --   --   --   --  0.80  --  0.67*  --  0.74*  --  0.76 0.81   EGFR  --   --   --   --  91.7  --  96.8  --  93.9  --  93.2 91.4   GLUCOSE  --   --   --   --  86  --  101*  --  90  --  96 97   CALCIUM  --   --   --   --  8.3*  --  7.8*  --  7.9*  --  8.0* 8.7   MAGNESIUM  --   --   --   --   --   --  2.2  --   --    --   --  2.5*   TSH  --   --   --   --   --   --   --   --   --   --   --  17.600*    < > = values in this interval not displayed.         Lab 07/23/25  0834 07/22/25  2220 07/21/25  1432   TOTAL PROTEIN 5.1* 4.4* 5.2*   ALBUMIN 2.6* 2.3* 2.7*   GLOBULIN 2.5 2.1 2.5   ALT (SGPT) 30 30 41   AST (SGOT) 33 35 45*   BILIRUBIN 0.8 0.6 0.6   ALK PHOS 61 51 61         Lab 07/21/25  1628 07/21/25  1432   PROBNP  --  345.0   HSTROP T 115* 142*             Lab 07/21/25  2059 07/21/25  1905   IRON 17* 14*   IRON SATURATION (TSAT)  --  7*   TIBC  --  188*   TRANSFERRIN  --  126*   FERRITIN  --  248.00   FOLATE 4.19*  --    VITAMIN B 12 1,385*  --    ABO TYPING O  --    RH TYPING Positive  --    ANTIBODY SCREEN Negative  --          Lab 07/23/25  0038   PH, ARTERIAL 7.452*   PCO2, ARTERIAL 36.5   PO2 ART 51.4*   FIO2 44   HCO3 ART 25.4   BASE EXCESS ART 1.5   CARBOXYHEMOGLOBIN 1.7     Brief Urine Lab Results  (Last result in the past 365 days)        Color   Clarity   Blood   Leuk Est   Nitrite   Protein   CREAT   Urine HCG        07/21/25 1439 Yellow   Clear   Negative   Trace   Negative   Trace                   Microbiology Results Abnormal       None            XR Chest 1 View  Result Date: 7/23/2025  XR CHEST 1 VW Date of Exam: 7/22/2025 11:16 PM EDT Indication: dyspnea Comparison: CT chest 7/21/2025. Findings: There is persistent patchy airspace disease versus atelectasis in the right lung base. Increased density over the right lateral chest, likely related to known pectoralis hematoma. There is new partial atelectasis in the left lung base. No pneumothorax or  definite pleural effusion. No acute osseous abnormality. Heart size and pulmonary vasculature appear within normal limits.     Impression: Impression: 1.Persistent patchy airspace disease versus atelectasis in the right lung base. 2.New partial atelectasis in the left lung base. Electronically Signed: Jd Pendleton MD  7/23/2025 12:01 AM EDT  Workstation ID:  [id number]          Current medications:  Scheduled Meds:levothyroxine, 125 mcg, Oral, Q AM  memantine, 10 mg, Oral, Q12H  mirtazapine, 15 mg, Oral, Nightly  OLANZapine zydis, 5 mg, Oral, Nightly  piperacillin-tazobactam, 3.375 g, Intravenous, Q8H  senna-docusate sodium, 2 tablet, Oral, BID  sodium chloride, 500 mL, Intravenous, Once  sodium chloride, 10 mL, Intravenous, Q12H      Continuous Infusions:     PRN Meds:.  acetaminophen **OR** acetaminophen **OR** acetaminophen    senna-docusate sodium **AND** polyethylene glycol **AND** bisacodyl **AND** bisacodyl    Calcium Replacement - Follow Nurse / BPA Driven Protocol    dextrose    dextrose    glucagon (human recombinant)    ipratropium-albuterol    Magnesium Standard Dose Replacement - Follow Nurse / BPA Driven Protocol    nitroglycerin    Phosphorus Replacement - Follow Nurse / BPA Driven Protocol    Potassium Replacement - Follow Nurse / BPA Driven Protocol    [COMPLETED] Insert Peripheral IV **AND** sodium chloride    [COMPLETED] Insert Peripheral IV **AND** sodium chloride    sodium chloride    sodium chloride    Assessment & Plan   Assessment & Plan     Active Hospital Problems    Diagnosis  POA    **Hypernatremia [E87.0]  Yes    Alzheimer dementia [G30.9, F02.80]  Yes    Hypocalcemia [E83.51]  Yes    Bacteriuria [R82.71]  Yes    Macrocytic anemia [D53.9]  Yes    Hypothyroidism (acquired) [E03.9]  Yes    History of pulmonary embolism [Z86.711]  Unknown      Resolved Hospital Problems   No resolved problems to display.        Brief Hospital Course to date:  Maxim Henson is a 74 y.o. male with history of dementia, aphasia, hypothyroidism, prior PE/DVT here s/p falls. He was found to have chest wall hematoma.  Overnight, pt had acute respiratory failure and is now on Bipap and unable to wean.  Family has decided on DNR/DNI and are contemplating hospice/comfort measures.    Acute Respiratory Failure  Suspect Aspiration PNA  -- required Bipap, weaned to 3L BNC  --  continue empiric ABX for possible aspiration  -- cannot r/o PE due to being unable to tolerate CTA, however, unable to add anticoagulation back due to below. Low suspicion, though, as he was anticoagulated up until admission.     Hypernatremia  -volume depletion, s/p IVFs    Falls  -with bruising  -R pectoralis muscle hematoma  -weakness, frequent hypotension noted on prior cardiology/neurology notes  -likely should DC further AC use    Anemia  -PRBC x 2 ordered/given   -possibly due to falls/hematomas  -monitor GI output given below    Stercoral proctitis  -bowel regimen    Advanced Alzheimer's  -progressive aphasia per neurology notes  -vascular Parkinsonian features as well  -fall risk  -continue home meds as able    Hypothyroidism  -synthroid    Bacteruria  -urine culture not sent on admission, but pt is now Zosyn, continue    GOC  -- consulted Hospice. Discussed with family and they are open to this discussion   -- DNR/DNI, however, wife did mention that blood transfusions were okay on 7/22      Hx of PE/DVT  -hold xarelto        Expected Discharge Location and Transportation: home with home hospice  Expected Discharge   Expected Discharge Date: 7/25/2025; Expected Discharge Time:      VTE Prophylaxis:  Pharmacologic & mechanical VTE prophylaxis orders are present.         AM-PAC 6 Clicks Score (PT): 6 (07/21/25 5060)    CODE STATUS:   Code Status and Medical Interventions: No CPR (Do Not Attempt to Resuscitate); Limited Support; No intubation (DNI), No artificial nutrition, No dialysis   Ordered at: 07/22/25 3849     Code Status (Patient has no pulse and is not breathing):    No CPR (Do Not Attempt to Resuscitate)     Medical Interventions (Patient has pulse or is breathing):    Limited Support     Medical Intervention Limits:    No intubation (DNI)       No artificial nutrition       No dialysis     Level Of Support Discussed With:    Next of Kin (If No Surrogate)       Health Care Surrogate       Queta  Efra Larose MD  07/24/25        Electronically signed by Queta Larose MD at 07/24/25 1313          Consult Notes (most recent note)        Bijal Hernandez MD at 07/22/25 1214        Consult Orders    1. Inpatient Palliative Care MD Consult [[id number]] ordered by Radha Yarbrough MD at 07/21/25 1909                 Palliative Care Initial Consult   Attending Physician: Jose Delgado MD  Referring Provider: Radha Yarbrough      Reason for Referral:  assistance with clarification of goals of care    Code Status:   Code Status and Medical Interventions: No CPR (Do Not Attempt to Resuscitate); Limited Support; No intubation (DNI)   Ordered at: 07/21/25 2030     Code Status (Patient has no pulse and is not breathing):    No CPR (Do Not Attempt to Resuscitate)     Medical Interventions (Patient has pulse or is breathing):    Limited Support     Medical Intervention Limits:    No intubation (DNI)     Level Of Support Discussed With:    Health Care Surrogate      Advanced Directives: Advance Directive Status: Patient does not have advance directive   Family/Support: spouse     Goals of Care:    Goals of Care/Treatment Preferences:    Treat what we can       HPI:   75 yo male to ED for eval of edema and frequent falls.  Most recent fall yesterday with R large pectoral hematoma.  H/H has trended down and now in need of PRBCs.  Additional eval significant for hypernatremia, decreased nutrition, anemia, hypothyroid, bacteruria and stercoral proctitis. Has underlying alzheimer's and has been in facility about 3 weeks.  Spouse had previously cared for at home but became too much.     ROS:   Pt did not answer any questions or vocalize during eval or with f/u with spouse      Past Medical History:   Diagnosis Date    Agitation     Alzheimer dementia with agitation     Anxiety     Anxiety     Aphasia     Atrial fibrillation     Atrial fibrillation     Dementia     Dysphagia     Gout     Gout     Hypertension      Hypothyroid     Pulmonary embolism    Psoriatic Arthritis    History reviewed. No pertinent surgical history.  Social History     Socioeconomic History    Marital status:    Substance and Sexual Activity    Drug use: Defer    Sexual activity: Defer     History reviewed. No pertinent family history.    Allergies   Allergen Reactions    Cefdinir Other (See Comments)     Unknown    Sulfamethoxazole-Trimethoprim Other (See Comments)     Unknown         Current Facility-Administered Medications:     acetaminophen (TYLENOL) tablet 650 mg, 650 mg, Oral, Q4H PRN **OR** acetaminophen (TYLENOL) 160 MG/5ML oral solution 650 mg, 650 mg, Oral, Q4H PRN **OR** acetaminophen (TYLENOL) suppository 650 mg, 650 mg, Rectal, Q4H PRN, Kelly, Elizabeth, APRN    sennosides-docusate (PERICOLACE) 8.6-50 MG per tablet 2 tablet, 2 tablet, Oral, BID, 2 tablet at 07/22/25 1026 **AND** polyethylene glycol (MIRALAX) packet 17 g, 17 g, Oral, Daily PRN **AND** bisacodyl (DULCOLAX) EC tablet 5 mg, 5 mg, Oral, Daily PRN **AND** bisacodyl (DULCOLAX) suppository 10 mg, 10 mg, Rectal, Daily PRN, Kelly, Elizabeth, APRN    dextrose (D50W) (25 g/50 mL) IV injection 25 g, 25 g, Intravenous, Q15 Min PRN, Radha Yarbrough MD    dextrose (GLUTOSE) oral gel 15 g, 15 g, Oral, Q15 Min PRN, Radha Yarbrough MD    dextrose 5 % and sodium chloride 0.45 % infusion, 75 mL/hr, Intravenous, Continuous, Kelly, Elizabeth, APRN, Last Rate: 75 mL/hr at 07/22/25 0304, 75 mL/hr at 07/22/25 0304    glucagon (GLUCAGEN) injection 1 mg, 1 mg, Intramuscular, Q15 Min PRN, Radha Yarbrough MD    levothyroxine (SYNTHROID, LEVOTHROID) tablet 125 mcg, 125 mcg, Oral, Q AM, Kelly, Elizabeth, APRN    memantine (NAMENDA) tablet 10 mg, 10 mg, Oral, Q12H, Kelly, Elizabeth, APRN, 10 mg at 07/22/25 1026    mirtazapine (REMERON) tablet 15 mg, 15 mg, Oral, Nightly, Kelly, Elizabeth, APRN    nitroglycerin (NITROSTAT) SL tablet 0.4 mg, 0.4 mg, Sublingual, Q5 Min PRN, Kelly, Elizabeth,  "APRN    OLANZapine zydis (zyPREXA) disintegrating tablet 5 mg, 5 mg, Oral, Nightly, Kelly, Elizabeth, APRN    sodium chloride 0.9 % bolus 500 mL, 500 mL, Intravenous, Once, Radha Yarbrough MD, Stopped at 07/21/25 2140    [COMPLETED] Insert Peripheral IV, , , Once **AND** sodium chloride 0.9 % flush 10 mL, 10 mL, Intravenous, PRN, [place], Farheen V, APRN    [COMPLETED] Insert Peripheral IV, , , Once **AND** sodium chloride 0.9 % flush 10 mL, 10 mL, Intravenous, PRN, [place], Farheen V, APRN    sodium chloride 0.9 % flush 10 mL, 10 mL, Intravenous, Q12H, Kelly, Elizabeth, APRN, 10 mL at 07/22/25 1026    sodium chloride 0.9 % flush 10 mL, 10 mL, Intravenous, PRN, Kelly, Elizabeth, APRN    sodium chloride 0.9 % infusion 40 mL, 40 mL, Intravenous, PRN, Kelly, Elizabeth, APRN     Palliative Performance Scale Score: 10%    /59   Pulse 61   Temp 97.7 °F (36.5 °C) (Oral)   Resp 19   Ht 177.8 cm (70\")   Wt 63 kg (139 lb)   SpO2 99%   BMI 19.94 kg/m²     Intake/Output Summary (Last 24 hours) at 7/22/2025 1214  Last data filed at 7/21/2025 2359  Gross per 24 hour   Intake 502 ml   Output 300 ml   Net 202 ml       Physical Exam:    General Appearance:    Alert, cooperative, NAD   HEENT:    NC/AT, anicteric, MMM, face relaxed   Neck:   supple, trachea midline, no JVD   Lungs:     CTA bilat, diminished in bases; respirations regular, even     and unlabored    Heart:    RRR, normal S1 and S2, no M/R/G   Abdomen:     Normal bowel sounds, soft, nontender, nondistended   G/U:   Deferred   MSK/Extremities:   No clubbing , cyanosis, + edema   Pulses:   Pulses palpable and equal bilaterally   Skin:   Warm, dry, no mottling   Neurologic:   Answered no questions.  + withdrawal   Psych:   Unable to assess         Labs:   Results from last 7 days   Lab Units 07/22/25  0944 07/22/25  0313   WBC 10*3/mm3  --  5.68   HEMOGLOBIN g/dL 6.9* 6.8*   HEMATOCRIT % 22.1* 22.6*   PLATELETS 10*3/mm3  --  205     Results from last 7 days   Lab " Units 07/22/25  0944 07/22/25  0313 07/21/25  1905 07/21/25  1432   SODIUM mmol/L 149* 149*   < > 152*   POTASSIUM mmol/L  --  3.2*   < > 3.7   CHLORIDE mmol/L  --  119*   < > 118*   CO2 mmol/L  --  24.0   < > 24.8   BUN mg/dL  --  12.0   < > 15.1   CREATININE mg/dL  --  0.74*   < > 0.81   CALCIUM mg/dL  --  7.9*   < > 8.7   BILIRUBIN mg/dL  --   --   --  0.6   ALK PHOS U/L  --   --   --  61   ALT (SGPT) U/L  --   --   --  41   AST (SGOT) U/L  --   --   --  45*   GLUCOSE mg/dL  --  90   < > 97    < > = values in this interval not displayed.     Imaging Results (Last 72 Hours)       Procedure Component Value Units Date/Time    CT Chest Without Contrast Diagnostic [[id number]] Collected: 07/21/25 1536     Updated: 07/21/25 1551    Narrative:      CT CHEST WO CONTRAST DIAGNOSTIC, CT ABDOMEN PELVIS WO CONTRAST    Date of Exam: 7/21/2025 3:16 PM EDT    Indication: Anterior chest bruising, frequent falls.    Comparison: None available.    Technique: Axial CT images were obtained of the chest abdomen and pelvis without contrast administration.  Reconstructed coronal and sagittal images were also obtained. Automated exposure control and iterative construction methods were used.      Findings:  CT CHEST:  MEDIASTINUM: There is a moderate size sliding hiatal hernia. There are aortic valvular calcifications aortic and heart size are normal. No mass nor pericardial effusion.  CORONARY ARTERIES: There is calcified atherosclerotic disease.  LUNGS: There is minimal right basal airspace disease, likely atelectasis. No additional consolidation. No significant nodule nor interstitial changes.  PLEURAL SPACE: Trace right pleural effusion.  LYMPH NODES: No pathologically enlarged thoracic nodes.    There is a heterogeneous mass centered within the right pectoralis major muscle which is only partially visualized given field-of-view. This measures at least 15 cm in transverse dimension by 5 cm in AP dimension. This likely represents an  intramuscular   hematoma. There is surrounding subcutaneous edema/ecchymosis. Correlate with history as neoplasm not excluded.    CT ABDOMEN AND PELVIS:   LIVER:  Unremarkable parenchyma without focal lesion.  BILIARY/GALLBLADDER:  Unremarkable  SPLEEN:  Unremarkable  PANCREAS:  Unremarkable  ADRENAL:  Unremarkable  KIDNEYS:  Unremarkable parenchyma with no solid mass identified. No obstruction.  No calculus identified.  GASTROINTESTINAL/MESENTERY: There is mural thickening with moderate fecal load involving the rectum. There is associated perirectal/presacral edema. 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 Correlate with history and symptoms.  AORTA/IVC: IVC filter is present on imaging study. It is recommended that all patients with IVC filters in place have an active management care plan to monitor their IVC filter. If a care plan is not in place, patient should have a non emergent referral   to an interventional specialist such as interventional radiology or other interventional vascular specialist for establishment of an IVC filter management care plan.    RETROPERITONEUM/LYMPH NODES:  Unremarkable    REPRODUCTIVE:  Unremarkable  BLADDER:  Unremarkable    OSSEUS STRUCTURES:  Typical for age with no acute process identified.    There is right anterior lateral abdominal wall subcutaneous edema versus ecchymosis.      Impression:      Impression:  1.There is a heterogeneous mass centered within the right pectoralis major muscle which is only partially visualized given field-of-view. This measures at least 15 cm in transverse dimension by 5 cm in AP dimension. This likely represents an   intramuscular hematoma. There is surrounding subcutaneous edema/ecchymosis. Correlate with history as neoplasm not excluded.  2.There is mural thickening with moderate fecal load involving the rectum. There is associated perirectal/presacral edema. 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 Correlate with history and  symptoms.  3.There is right anterior lateral abdominal wall subcutaneous edema versus ecchymosis.  4.Trace right pleural effusion.  5.Moderate size sliding hiatal hernia.        Electronically Signed: Clark Espinoza MD    7/21/2025 3:48 PM EDT    Workstation ID: [id number]    CT Abdomen Pelvis Without Contrast [[id number]] Collected: 07/21/25 1536     Updated: 07/21/25 1551    Narrative:      CT CHEST WO CONTRAST DIAGNOSTIC, CT ABDOMEN PELVIS WO CONTRAST    Date of Exam: 7/21/2025 3:16 PM EDT    Indication: Anterior chest bruising, frequent falls.    Comparison: None available.    Technique: Axial CT images were obtained of the chest abdomen and pelvis without contrast administration.  Reconstructed coronal and sagittal images were also obtained. Automated exposure control and iterative construction methods were used.      Findings:  CT CHEST:  MEDIASTINUM: There is a moderate size sliding hiatal hernia. There are aortic valvular calcifications aortic and heart size are normal. No mass nor pericardial effusion.  CORONARY ARTERIES: There is calcified atherosclerotic disease.  LUNGS: There is minimal right basal airspace disease, likely atelectasis. No additional consolidation. No significant nodule nor interstitial changes.  PLEURAL SPACE: Trace right pleural effusion.  LYMPH NODES: No pathologically enlarged thoracic nodes.    There is a heterogeneous mass centered within the right pectoralis major muscle which is only partially visualized given field-of-view. This measures at least 15 cm in transverse dimension by 5 cm in AP dimension. This likely represents an intramuscular   hematoma. There is surrounding subcutaneous edema/ecchymosis. Correlate with history as neoplasm not excluded.    CT ABDOMEN AND PELVIS:   LIVER:  Unremarkable parenchyma without focal lesion.  BILIARY/GALLBLADDER:  Unremarkable  SPLEEN:  Unremarkable  PANCREAS:  Unremarkable  ADRENAL:  Unremarkable  KIDNEYS:  Unremarkable parenchyma with no  solid mass identified. No obstruction.  No calculus identified.  GASTROINTESTINAL/MESENTERY: There is mural thickening with moderate fecal load involving the rectum. There is associated perirectal/presacral edema. 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 Correlate with history and symptoms.  AORTA/IVC: IVC filter is present on imaging study. It is recommended that all patients with IVC filters in place have an active management care plan to monitor their IVC filter. If a care plan is not in place, patient should have a non emergent referral   to an interventional specialist such as interventional radiology or other interventional vascular specialist for establishment of an IVC filter management care plan.    RETROPERITONEUM/LYMPH NODES:  Unremarkable    REPRODUCTIVE:  Unremarkable  BLADDER:  Unremarkable    OSSEUS STRUCTURES:  Typical for age with no acute process identified.    There is right anterior lateral abdominal wall subcutaneous edema versus ecchymosis.      Impression:      Impression:  1.There is a heterogeneous mass centered within the right pectoralis major muscle which is only partially visualized given field-of-view. This measures at least 15 cm in transverse dimension by 5 cm in AP dimension. This likely represents an   intramuscular hematoma. There is surrounding subcutaneous edema/ecchymosis. Correlate with history as neoplasm not excluded.  2.There is mural thickening with moderate fecal load involving the rectum. There is associated perirectal/presacral edema. 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 Correlate with history and symptoms.  3.There is right anterior lateral abdominal wall subcutaneous edema versus ecchymosis.  4.Trace right pleural effusion.  5.Moderate size sliding hiatal hernia.        Electronically Signed: Clark Espinoza MD    7/21/2025 3:48 PM EDT    Workstation ID: [id number]    CT Head Without Contrast [[id number]] Collected: 07/21/25 1533     Updated: 07/21/25 1548     Narrative:      CT HEAD WO CONTRAST    Date of Exam: 7/21/2025 3:16 PM EDT    Indication: frequent falls.    Comparison: 7/9/2025    Technique: Axial CT images were obtained of the head without contrast administration.  Automated exposure control and iterative construction methods were used.      Findings:  There is no evidence of acute territorial infarction.    There is no acute intracranial hemorrhage. There are no extra-axial collections.    Ventricles and CSF spaces are symmetrically prominent. No mass effect nor hydrocephalus.    Brain parenchyma appears unchanged.     Paranasal sinuses and mastoid air cells are adequately aerated.     Osseous structures and orbits appear intact.      Impression:      Impression:  1.No acute intracranial process is identified.  2.Generalized atrophy.        Electronically Signed: Clark Espinoza MD    7/21/2025 3:36 PM EDT    Workstation ID: [id number]    XR Chest 1 View [[id number]] Collected: 07/21/25 1426     Updated: 07/21/25 1429    Narrative:      XR CHEST 1 VW    Date of Exam: 7/21/2025 2:10 PM EDT    Indication: Leg edema    Comparison: None available.    Findings:  Normal cardiomediastinal silhouette. The lungs are clear. No pleural effusion or pneumothorax. No acute osseous findings.      Impression:      Impression:  No acute cardiopulmonary findings.        Electronically Signed: Agusto Goodman MD    7/21/2025 2:26 PM EDT    Workstation ID: [id number]                Diagnostics:   No valid procedures specified.    A:     Hypernatremia    Alzheimer dementia    Hypocalcemia    Bacteriuria    Macrocytic anemia    Hypothyroidism (acquired)    History of pulmonary embolism         Impression:   R pectoral hematoma  Alzheimer's dementia with agitation  Stercoral proctitis  Hypernatremia  Afib  Gout hx  Hypoalbuminemia - 2.7  Hx psoriatic arthritis    Symptoms:  AMS  Debility       P:    Met with spouse in room.  Pt did not/unable to participate.  Reviewed recent events.   Agreed to PRBCs.  Reviewed code status and updated.  Would not want CPR, intubation, TF or HD.  Will continue to monitor for needs and support spouse through decisions. Thank you for this consult and allowing us to participate in patient's plan of care. Palliative Care Team will continue to follow patient.       Bijal Hernandez MD, 7/22/2025, 12:14 EDT        Electronically signed by Bijal Hernandez MD at 07/22/25 9539

## 2025-07-25 NOTE — CASE MANAGEMENT/SOCIAL WORK
Continued Stay Note   A[facility]     Patient Name: Maxim Henson  MRN: [id number]  Today's Date: 7/25/2025    Admit Date: 7/21/2025    Plan: LTC with hospice   Discharge Plan       Row Name 07/25/25 1207       Plan    Plan LTC with hospice    Patient/Family in Agreement with Plan yes    Plan Comments Spoke with spouse and daughter at bedside this am at that time they requested hospice to visit to determine if they would like their services. Recieved notifiation from hospice provider that spouse would like LTC at this time. List take to spouse and other family member with referrals per discussion. CM will cont to follow.    Final Discharge Disposition Code 04 - intermediate care facility                   Discharge Codes    No documentation.                 Expected Discharge Date and Time       Expected Discharge Date Expected Discharge Time    Jul 26, 2025               Elisa Berry RN

## 2025-07-25 NOTE — THERAPY RE-EVALUATION
Acute Care - Speech Language Pathology   Swallow Re-Evaluation [facility]  Clinical Swallow Evaluation       Patient Name: Maxim Henson  : 1950  MRN: [id number]  Today's Date: 2025               Admit Date: 2025    Visit Dx:     ICD-10-CM ICD-9-CM   1. Anemia, unspecified type  D64.9 285.9   2. Leg edema  R60.0 782.3   3. Hematoma of right chest wall, initial encounter  S20.211A 922.1   4. Proctitis  K62.89 569.49   5. Pleural effusion  J90 511.9   6. Frequent falls  R29.6 V15.88   7. Dysphagia, unspecified type  R13.10 787.20   8. Cognitive communication deficit  R41.841 799.52     Patient Active Problem List   Diagnosis    Hypernatremia    Alzheimer dementia    Hypocalcemia    Bacteriuria    Macrocytic anemia    Hypothyroidism (acquired)    History of pulmonary embolism     Past Medical History:   Diagnosis Date    Agitation     Alzheimer dementia with agitation     Anxiety     Anxiety     Aphasia     Atrial fibrillation     Atrial fibrillation     Dementia     Dysphagia     Gout     Gout     Hypertension     Hypothyroid     Pulmonary embolism      History reviewed. No pertinent surgical history.    SLP Recommendation and Plan  SLP Swallowing Diagnosis: oral dysphagia, R/O pharyngeal dysphagia (25)  SLP Diet Recommendation: puree, thin liquids (25)  Recommended Precautions and Strategies: general aspiration precautions, 1:1 supervision, assist with feeding, check mouth frequently for oral residue/pocketing (25)  SLP Rec. for Method of Medication Administration: meds crushed, with puree (25)     Monitor for Signs of Aspiration: notify SLP if any concerns (25)  Recommended Diagnostics: other (see comments) (diet tolerance) (25)  Swallow Criteria for Skilled Therapeutic Interventions Met: demonstrates skilled criteria (25)  Anticipated Discharge Disposition (SLP): skilled nursing facility (25)  Rehab  Potential/Prognosis, Swallowing: re-evaluate goals as necessary (07/25/25 1540)  Therapy Frequency (Swallow): PRN (07/25/25 1540)  Predicted Duration Therapy Intervention (Days): 1 week (07/25/25 1540)  Oral Care Recommendations: Oral Care BID/PRN, Toothbrush (07/25/25 1540)                                               SWALLOW EVALUATION (Last 72 Hours)       SLP Adult Swallow Evaluation       Row Name 07/25/25 1540                   Rehab Evaluation    Document Type re-evaluation  -        Subjective Information no complaints  -        Patient Observations lethargic  -        Patient/Family/Caregiver Comments/Observations No family present  -        Patient Effort adequate  -        Symptoms Noted During/After Treatment none  -        Oral Care patient refused intervention  -           General Information    Patient Profile Reviewed yes  -        Pertinent History Of Current Problem See initial eval. Re-consulted 2' concern for aspiration event (7/23) & increased O2 requirements  -        Current Method of Nutrition NPO  -        Precautions/Limitations, Vision difficult to assess  -        Precautions/Limitations, Hearing difficult to assess  -        Prior Level of Function-Communication cognitive-linguistic impairment;other (see comments)  dementia per chart  -        Prior Level of Function-Swallowing mechanical ground textures;thin liquids  -        Plans/Goals Discussed with patient  -        Barriers to Rehab cognitive status  -        Patient's Goals for Discharge patient did not state  -           Pain    Additional Documentation Pain Scale: FACES Pre/Post-Treatment (Group)  -           Pain Scale: FACES Pre/Post-Treatment    Pain: FACES Scale, Pretreatment 0-->no hurt  -        Posttreatment Pain Rating 0-->no hurt  -           Oral Motor Structure and Function    Secretion Management dried secretions in oral cavity;other (see comments)  Attempted to provide oral  care, pt pursed lips & turned head  -        Mucosal Quality dry  -           Oral Musculature and Cranial Nerve Assessment    Oral Motor General Assessment unable to assess;other (see comments)  Pt u/a to follow OM commands  -           General Eating/Swallowing Observations    Respiratory Support Currently in Use nasal cannula  -        O2 Liters 4L  -        Eating/Swallowing Skills fed by SLP;unaware of safety concerns  -        Positioning During Eating upright 90 degree;upright in bed  -        Utensils Used spoon;cup;straw  -        Consistencies Trialed ice chips;thin liquids;pureed  -           Clinical Swallow Eval    Oral Prep Phase impaired  -        Pharyngeal Phase no overt signs/symptoms of pharyngeal impairment  -        Clinical Swallow Evaluation Summary No overt s/s of aspiration w/ trials of thin liquid or pureed consistenices. C/w prior eval, pt felt to be @ risk of aspiration given cognitive status. D/w MD, plan is to keep NPO until GOC/POC decisions are made by spouse.  -           SLP Evaluation Clinical Impression    SLP Swallowing Diagnosis oral dysphagia;R/O pharyngeal dysphagia  -        Functional Impact risk of aspiration/pneumonia;risk of malnutrition;risk of dehydration  -        Rehab Potential/Prognosis, Swallowing re-evaluate goals as necessary  -        Swallow Criteria for Skilled Therapeutic Interventions Met demonstrates skilled criteria  -           Recommendations    Therapy Frequency (Swallow) PRN  -        Predicted Duration Therapy Intervention (Days) 1 week  -        SLP Diet Recommendation puree;thin liquids  -        Recommended Diagnostics other (see comments)  diet tolerance  -        Recommended Precautions and Strategies general aspiration precautions;1:1 supervision;assist with feeding;check mouth frequently for oral residue/pocketing  -        Oral Care Recommendations Oral Care BID/PRN;Toothbrush  -        SLP Rec. for  Method of Medication Administration meds crushed;with puree  -        Monitor for Signs of Aspiration notify SLP if any concerns  -        Anticipated Discharge Disposition (SLP) skilled nursing facility  -                  User Key  (r) = Recorded By, (t) = Taken By, (c) = Cosigned By      Initials Name Effective Dates    TIM Thuan Donna LEMON MS CCC-SLP 05/12/23 -                     EDUCATION  The patient has been educated in the following areas:   Dysphagia (Swallowing Impairment) Oral Care/Hydration Modified Diet Instruction.        SLP GOALS       Row Name 07/25/25 1540             (LTG) Patient will demonstrate functional swallow for    Diet Texture (Demonstrate functional swallow) soft to chew (chopped) textures  -      Liquid viscosity (Demonstrate functional swallow) thin liquids  -      [place] (Demonstrate functional swallow) with minimal cues (75-90% accuracy)  -      Time Frame (Demonstrate functional swallow) by discharge;1 week  -      Barriers (Demonstrate functional swallow) cognitive status  -      Progress/Outcomes (Demonstrate functional swallow) goal ongoing  -         ([facility]) Patient will tolerate trials of    Consistencies Trialed (Tolerate trials) pureed textures;thin liquids  -      Desired Outcome (Tolerate trials) without signs/symptoms of aspiration;with adequate oral prep/transit/clearance  -      [place] (Tolerate trials) with minimal cues (75-90% accuracy)  -      Time Frame (Tolerate trials) 1 week  -      Progress/Outcomes (Tolerate trials) goal ongoing  -                User Key  (r) = Recorded By, (t) = Taken By, (c) = Cosigned By      Initials Name Provider Type    Donna Licea, MS CCC-SLP Speech and Language Pathologist                         Time Calculation:    Time Calculation- SLP       Row Name 07/25/25 1634             Time Calculation- [facility]    SLP Start Time 1540  -      SLP Received On 07/25/25  -         Untimed Charges     07685-HC Eval Oral Pharyng Swallow Minutes 59  -MH         Total Minutes    Untimed Charges Total Minutes 59  -MH       Total Minutes 59  -MH                User Key  (r) = Recorded By, (t) = Taken By, (c) = Cosigned By      Initials Name Provider Type    Donna Licea, MS CCC-SLP Speech and Language Pathologist                    Therapy Charges for Today       Code Description Service Date Service Provider Modifiers Qty    [id number]  ST EVAL ORAL PHARYNG SWALLOW 4 7/25/2025 Donna Larose, MS ANURADHA-SLP GN 1                 Donna Larose MS CCC-LIZET  7/25/2025

## 2025-07-26 NOTE — PROGRESS NOTES
Nutrition Services    Patient Name:  Maxim Henson  YOB: 1950  MRN: [id number]  Admit Date:  7/21/2025    Noted that have made a diet order. Pt often too drowsy to eat. Will send Boost on bfst tray and Magic Cup on lunch tray for those attempting to feed to use as appropriate.    Electronically signed by:  Jackie Mcnulty RD  07/25/25 23:15 EDT

## 2025-07-26 NOTE — PROGRESS NOTES
[facility] Medicine Services  PROGRESS NOTE    Patient Name: Maxim Henson  : 1950  MRN: [id number]    Date of Admission: 2025  Primary Care Physician: Lynn Orozco APRN    Subjective   Subjective     CC: S/P Falls    HPI: No new issues overnight per chart review. Pt sleeping this am with no family at bedside.     Objective   Objective     Vital Signs:   Temp:  [97.7 °F (36.5 °C)-99.7 °F (37.6 °C)] 98.6 °F (37 °C)  Heart Rate:  [60-87] 77  Resp:  [16-18] 18  BP: (105-144)/() 106/83  Flow (L/min) (Oxygen Therapy):  [3-4] 4     Physical Exam:  Constitutional: non-responsive, on 4L BNC  HENT: NCAT, mucous membranes moist  Respiratory: Clear to auscultation bilaterally, respiratory effort normal   Cardiovascular: RRR, no murmurs, rubs, or gallops  Gastrointestinal: Positive bowel sounds, soft, nontender, nondistended  Musculoskeletal: No bilateral ankle edema  Psychiatric: WOO  Neurologic: WOO  Skin: No rashes, R chest wall hematoma     Results Reviewed:  LAB RESULTS:      Lab 25  0411 25  2340 25  1950 25  1502 25  1040 25  1405 25  0753 25  1206 25  0834 25  0037 25  2331 25  0944 25  0313 25  0000 25  1628 25  1432   WBC  --   --   --   --   --   --  9.18  --  9.76  --  6.36  --  5.68  --   --  8.35   HEMOGLOBIN 9.2* 10.6* 10.2* 9.4* 9.5*   < > 11.1*   < > 9.7*  --  8.8*   < > 6.8*   < >  --  8.4*   HEMATOCRIT 28.3* 33.0* 31.3* 28.9* 29.7*   < > 34.2*   < > 30.8*  --  27.5*   < > 22.6*   < >  --  27.0*   PLATELETS  --   --   --   --   --   --  251  --  226  --  197  --  205  --   --  244   NEUTROS ABS  --   --   --   --   --   --  7.41*  --   --   --  4.76  --  4.08  --   --  6.68   IMMATURE GRANS (ABS)  --   --   --   --   --   --  0.04  --   --   --  0.03  --  0.02  --   --  0.04   LYMPHS ABS  --   --   --   --   --   --  0.74  --   --   --  0.72  --  0.72  --   --  0.68*    MONOS ABS  --   --   --   --   --   --  0.85  --   --   --  0.73  --  0.74  --   --  0.85   EOS ABS  --   --   --   --   --   --  0.11  --   --   --  0.09  --  0.09  --   --  0.05   MCV  --   --   --   --   --   --  95.5  --  96.9  --  97.2*  --  103.7*  --   --  102.3*   CRP  --   --   --   --   --   --   --   --   --   --   --   --   --   --   --  6.42*   PROCALCITONIN  --   --   --   --   --   --   --   --   --   --  0.11  --   --   --   --  0.12   LACTATE  --   --   --   --   --   --   --   --   --   --  0.9  --   --   --   --  1.2   D DIMER QUANT  --   --   --   --   --   --   --   --   --  1.91*  --   --   --   --   --   --    HSTROP T  --   --   --   --   --   --   --   --   --   --   --   --   --   --  115* 142*    < > = values in this interval not displayed.         Lab 07/26/25  0411 07/25/25  2340 07/25/25  1950 07/25/25  1502 07/25/25  1040 07/24/25  1406 07/24/25  0753 07/23/25  1545 07/23/25  1207 07/23/25  0834 07/22/25  2331 07/22/25  2220 07/22/25  0944 07/22/25  0313 07/21/25  2212 07/21/25  1905 07/21/25  1432   SODIUM 139 139 140 143 138   < > 139   < > 143 143   < > 145  145   < > 149*   < > 149* 152*   POTASSIUM  --   --   --   --   --   --  4.1  --  4.6 4.8  --  3.1*  --  3.2*  --  3.5 3.7   CHLORIDE  --   --   --   --   --   --  109*  --   --  114*  --  117*  --  119*  --  117* 118*   CO2  --   --   --   --   --   --  20.0*  --   --  22.1  --  21.0*  --  24.0  --  26.0 24.8   ANION GAP  --   --   --   --   --   --  10.0  --   --  6.9  --  7.0  --  6.0  --  6.0 9.2   BUN  --   --   --   --   --   --  8.5  --   --  8.7  --  8.9  --  12.0  --  14.4 15.1   CREATININE  --   --   --   --   --   --  0.74*  --   --  0.80  --  0.67*  --  0.74*  --  0.76 0.81   EGFR  --   --   --   --   --   --  93.9  --   --  91.7  --  96.8  --  93.9  --  93.2 91.4   GLUCOSE  --   --   --   --   --   --  78  --   --  86  --  101*  --  90  --  96 97   CALCIUM  --   --   --   --   --   --  8.4*  --   --  8.3*  --   7.8*  --  7.9*  --  8.0* 8.7   MAGNESIUM  --   --   --   --   --   --   --   --   --   --   --  2.2  --   --   --   --  2.5*   TSH  --   --   --   --   --   --   --   --   --   --   --   --   --   --   --   --  17.600*    < > = values in this interval not displayed.         Lab 07/23/25  0834 07/22/25  2220 07/21/25  1432   TOTAL PROTEIN 5.1* 4.4* 5.2*   ALBUMIN 2.6* 2.3* 2.7*   GLOBULIN 2.5 2.1 2.5   ALT (SGPT) 30 30 41   AST (SGOT) 33 35 45*   BILIRUBIN 0.8 0.6 0.6   ALK PHOS 61 51 61         Lab 07/21/25  1628 07/21/25  1432   PROBNP  --  345.0   HSTROP T 115* 142*             Lab 07/21/25  2059 07/21/25  1905   IRON 17* 14*   IRON SATURATION (TSAT)  --  7*   TIBC  --  188*   TRANSFERRIN  --  126*   FERRITIN  --  248.00   FOLATE 4.19*  --    VITAMIN B 12 1,385*  --    ABO TYPING O  --    RH TYPING Positive  --    ANTIBODY SCREEN Negative  --          Lab 07/23/25  0038   PH, ARTERIAL 7.452*   PCO2, ARTERIAL 36.5   PO2 ART 51.4*   FIO2 44   HCO3 ART 25.4   BASE EXCESS ART 1.5   CARBOXYHEMOGLOBIN 1.7     Brief Urine Lab Results  (Last result in the past 365 days)        Color   Clarity   Blood   Leuk Est   Nitrite   Protein   CREAT   Urine HCG        07/21/25 1439 Yellow   Clear   Negative   Trace   Negative   Trace                   Microbiology Results Abnormal       None            No radiology results from the last 24 hrs          Current medications:  Scheduled Meds:levothyroxine, 125 mcg, Oral, Q AM  memantine, 10 mg, Oral, Q12H  mirtazapine, 15 mg, Oral, Nightly  OLANZapine zydis, 5 mg, Oral, Nightly  piperacillin-tazobactam, 3.375 g, Intravenous, Q8H  senna-docusate sodium, 2 tablet, Oral, BID  sodium chloride, 500 mL, Intravenous, Once  sodium chloride, 10 mL, Intravenous, Q12H      Continuous Infusions:       PRN Meds:.  acetaminophen **OR** acetaminophen **OR** acetaminophen    senna-docusate sodium **AND** polyethylene glycol **AND** bisacodyl **AND** bisacodyl    Calcium Replacement - Follow Nurse /  BPA Driven Protocol    dextrose    dextrose    glucagon (human recombinant)    ipratropium-albuterol    Magnesium Standard Dose Replacement - Follow Nurse / BPA Driven Protocol    nitroglycerin    Phosphorus Replacement - Follow Nurse / BPA Driven Protocol    Potassium Replacement - Follow Nurse / BPA Driven Protocol    [COMPLETED] Insert Peripheral IV **AND** sodium chloride    [COMPLETED] Insert Peripheral IV **AND** sodium chloride    sodium chloride    sodium chloride    Assessment & Plan   Assessment & Plan     Active Hospital Problems    Diagnosis  POA    **Hypernatremia [E87.0]  Yes    Alzheimer dementia [G30.9, F02.80]  Yes    Hypocalcemia [E83.51]  Yes    Bacteriuria [R82.71]  Yes    Macrocytic anemia [D53.9]  Yes    Hypothyroidism (acquired) [E03.9]  Yes    History of pulmonary embolism [Z86.711]  Unknown      Resolved Hospital Problems   No resolved problems to display.        Brief Hospital Course to date:  Maxim Henson is a 74 y.o. male with history of dementia, aphasia, hypothyroidism, prior PE/DVT here s/p falls. He was found to have chest wall hematoma.  Overnight, pt had acute respiratory failure and is now on Bipap and unable to wean.  Family has decided on DNR/DNI and are contemplating hospice/comfort measures.    Acute Respiratory Failure  Suspect Aspiration PNA  -- required Bipap, weaned to 3L BNC  -- continue empiric ABX for possible aspiration to complete a 5 day course   -- cannot r/o PE due to being unable to tolerate CTA, however, unable to add anticoagulation back due to below. Low suspicion, though, as he was anticoagulated up until admission.     Hypernatremia  -volume depletion, s/p IVFs    Falls  -with bruising  -R pectoralis muscle hematoma  -weakness, frequent hypotension noted on prior cardiology/neurology notes  -likely should DC further AC use    Anemia  -PRBC x 2 ordered/given   -possibly due to falls/hematomas  -monitor GI output given below    Stercoral proctitis  -bowel  regimen    Advanced Alzheimer's  -progressive aphasia per neurology notes  -vascular Parkinsonian features as well  -fall risk  -continue home meds as able    Dysphagia  -- wife was agreeable to diet yesterday when I talked to her, understood the risks. SLP has seen, diet as per their instructions.     Hypothyroidism  -synthroid    Bacteruria  -urine culture not sent on admission, but pt is now Zosyn, continue    GOC  -- consulted Hospice. Plan is LTACH with hospice   -- DNR/DNI, however, wife did mention that blood transfusions were okay on 7/22      Hx of PE/DVT  -hold xarelto        Expected Discharge Location and Transportation: home with home hospice  Expected Discharge   Expected Discharge Date: 7/26/2025; Expected Discharge Time:      VTE Prophylaxis:  Pharmacologic & mechanical VTE prophylaxis orders are present.         AM-PAC 6 Clicks Score (PT): 6 (07/25/25 2000)    CODE STATUS:   Code Status and Medical Interventions: No CPR (Do Not Attempt to Resuscitate); Limited Support; No intubation (DNI), No artificial nutrition, No dialysis   Ordered at: 07/22/25 1216     Code Status (Patient has no pulse and is not breathing):    No CPR (Do Not Attempt to Resuscitate)     Medical Interventions (Patient has pulse or is breathing):    Limited Support     Medical Intervention Limits:    No intubation (DNI)       No artificial nutrition       No dialysis     Level Of Support Discussed With:    Next of Kin (If No Surrogate)       Health Care Surrogate       Queta Larose MD  07/26/25

## 2025-07-27 NOTE — PROGRESS NOTES
[facility] Medicine Services  PROGRESS NOTE    Patient Name: Maxim Henson  : 1950  MRN: [id number]    Date of Admission: 2025  Primary Care Physician: Lynn Orozco APRN    Subjective   Subjective     CC: S/P Falls    HPI: Pt had acute respiratory decline again yesterday afternoon and was on Bipap around 130pm.  Came to discuss with patient's wife and family friend at bedside. They wanted to officially wait for the son to arrive before deciding on comfort measures- I.e. downgrading to HFNC, discontinuing tele and doing Morphine PRN air hunger.  I was later notified that son was in agreement. Pt was weaned to HFNC around 4 pm and then weaned pretty quickly back to NC.  This am, he is alert and on NC. No other issues overnight per nursing. No family at bedside.     Objective   Objective     Vital Signs:   Temp:  [97.3 °F (36.3 °C)-98.7 °F (37.1 °C)] 98.7 °F (37.1 °C)  Heart Rate:  [58-92] 88  Resp:  [18] 18  BP: (79-99)/(52-68) 99/57  Flow (L/min) (Oxygen Therapy):  [2-12] 4     Physical Exam:  Constitutional: non-responsive, but awake,  on 4L BNC  HENT: NCAT, mucous membranes moist  Respiratory: Clear to auscultation bilaterally, respiratory effort normal   Cardiovascular: RRR, no murmurs, rubs, or gallops  Gastrointestinal: Positive bowel sounds, soft, nontender, nondistended  Musculoskeletal: No bilateral ankle edema, now with large flank ecchymosis on R   Psychiatric: WOO  Neurologic: WOO  Skin: No rashes, R chest wall hematoma     Results Reviewed:  LAB RESULTS:      Lab 25  1243 25  0411 25  2340 25  1950 25  1502 25  1405 25  0753 25  1206 25  0834 25  0037 25  2331 25  0944 25  0313 25  0000 25  1628 25  1432   WBC  --   --   --   --   --   --  9.18  --  9.76  --  6.36  --  5.68  --   --  8.35   HEMOGLOBIN 9.2* 9.2* 10.6* 10.2* 9.4*   < > 11.1*   < > 9.7*  --  8.8*   < >  6.8*   < >  --  8.4*   HEMATOCRIT 29.4* 28.3* 33.0* 31.3* 28.9*   < > 34.2*   < > 30.8*  --  27.5*   < > 22.6*   < >  --  27.0*   PLATELETS  --   --   --   --   --   --  251  --  226  --  197  --  205  --   --  244   NEUTROS ABS  --   --   --   --   --   --  7.41*  --   --   --  4.76  --  4.08  --   --  6.68   IMMATURE GRANS (ABS)  --   --   --   --   --   --  0.04  --   --   --  0.03  --  0.02  --   --  0.04   LYMPHS ABS  --   --   --   --   --   --  0.74  --   --   --  0.72  --  0.72  --   --  0.68*   MONOS ABS  --   --   --   --   --   --  0.85  --   --   --  0.73  --  0.74  --   --  0.85   EOS ABS  --   --   --   --   --   --  0.11  --   --   --  0.09  --  0.09  --   --  0.05   MCV  --   --   --   --   --   --  95.5  --  96.9  --  97.2*  --  103.7*  --   --  102.3*   CRP  --   --   --   --   --   --   --   --   --   --   --   --   --   --   --  6.42*   PROCALCITONIN  --   --   --   --   --   --   --   --   --   --  0.11  --   --   --   --  0.12   LACTATE  --   --   --   --   --   --   --   --   --   --  0.9  --   --   --   --  1.2   D DIMER QUANT  --   --   --   --   --   --   --   --   --  1.91*  --   --   --   --   --   --    HSTROP T  --   --   --   --   --   --   --   --   --   --   --   --   --   --  115* 142*    < > = values in this interval not displayed.         Lab 07/26/25  1243 07/26/25  0758 07/26/25  0411 07/25/25  2340 07/25/25  1950 07/24/25  1406 07/24/25  0753 07/23/25  1545 07/23/25  1207 07/23/25  0834 07/22/25  2331 07/22/25  2220 07/22/25  0944 07/22/25  0313 07/21/25  2212 07/21/25  1905 07/21/25  1432   SODIUM 140 138 139 139 140   < > 139   < > 143 143   < > 145  145   < > 149*   < > 149* 152*   POTASSIUM  --   --   --   --   --   --  4.1  --  4.6 4.8  --  3.1*  --  3.2*  --  3.5 3.7   CHLORIDE  --   --   --   --   --   --  109*  --   --  114*  --  117*  --  119*  --  117* 118*   CO2  --   --   --   --   --   --  20.0*  --   --  22.1  --  21.0*  --  24.0  --  26.0 24.8   ANION GAP  --    --   --   --   --   --  10.0  --   --  6.9  --  7.0  --  6.0  --  6.0 9.2   BUN  --   --   --   --   --   --  8.5  --   --  8.7  --  8.9  --  12.0  --  14.4 15.1   CREATININE  --   --   --   --   --   --  0.74*  --   --  0.80  --  0.67*  --  0.74*  --  0.76 0.81   EGFR  --   --   --   --   --   --  93.9  --   --  91.7  --  96.8  --  93.9  --  93.2 91.4   GLUCOSE  --   --   --   --   --   --  78  --   --  86  --  101*  --  90  --  96 97   CALCIUM  --   --   --   --   --   --  8.4*  --   --  8.3*  --  7.8*  --  7.9*  --  8.0* 8.7   MAGNESIUM  --   --   --   --   --   --   --   --   --   --   --  2.2  --   --   --   --  2.5*   TSH  --   --   --   --   --   --   --   --   --   --   --   --   --   --   --   --  17.600*    < > = values in this interval not displayed.         Lab 07/23/25  0834 07/22/25 2220 07/21/25  1432   TOTAL PROTEIN 5.1* 4.4* 5.2*   ALBUMIN 2.6* 2.3* 2.7*   GLOBULIN 2.5 2.1 2.5   ALT (SGPT) 30 30 41   AST (SGOT) 33 35 45*   BILIRUBIN 0.8 0.6 0.6   ALK PHOS 61 51 61         Lab 07/21/25  1628 07/21/25  1432   PROBNP  --  345.0   HSTROP T 115* 142*             Lab 07/21/25 2059 07/21/25  1905   IRON 17* 14*   IRON SATURATION (TSAT)  --  7*   TIBC  --  188*   TRANSFERRIN  --  126*   FERRITIN  --  248.00   FOLATE 4.19*  --    VITAMIN B 12 1,385*  --    ABO TYPING O  --    RH TYPING Positive  --    ANTIBODY SCREEN Negative  --          Lab 07/23/25  0038   PH, ARTERIAL 7.452*   PCO2, ARTERIAL 36.5   PO2 ART 51.4*   FIO2 44   HCO3 ART 25.4   BASE EXCESS ART 1.5   CARBOXYHEMOGLOBIN 1.7     Brief Urine Lab Results  (Last result in the past 365 days)        Color   Clarity   Blood   Leuk Est   Nitrite   Protein   CREAT   Urine HCG        07/21/25 1439 Yellow   Clear   Negative   Trace   Negative   Trace                   Microbiology Results Abnormal       None            No radiology results from the last 24 hrs          Current medications:  Scheduled Meds:levothyroxine, 125 mcg, Oral, Q AM  memantine, 10  mg, Oral, Q12H  mirtazapine, 15 mg, Oral, Nightly  OLANZapine zydis, 5 mg, Oral, Nightly  senna-docusate sodium, 2 tablet, Oral, BID  sodium chloride, 500 mL, Intravenous, Once  sodium chloride, 10 mL, Intravenous, Q12H      Continuous Infusions:       PRN Meds:.  acetaminophen **OR** acetaminophen **OR** acetaminophen    senna-docusate sodium **AND** polyethylene glycol **AND** bisacodyl **AND** bisacodyl    Calcium Replacement - Follow Nurse / BPA Driven Protocol    dextrose    dextrose    glucagon (human recombinant)    ipratropium-albuterol    Magnesium Standard Dose Replacement - Follow Nurse / BPA Driven Protocol    Morphine    Phosphorus Replacement - Follow Nurse / BPA Driven Protocol    Potassium Replacement - Follow Nurse / BPA Driven Protocol    sodium chloride    sodium chloride    Assessment & Plan   Assessment & Plan     Active Hospital Problems    Diagnosis  POA    **Hypernatremia [E87.0]  Yes    Alzheimer dementia [G30.9, F02.80]  Yes    Hypocalcemia [E83.51]  Yes    Bacteriuria [R82.71]  Yes    Macrocytic anemia [D53.9]  Yes    Hypothyroidism (acquired) [E03.9]  Yes    History of pulmonary embolism [Z86.711]  Unknown      Resolved Hospital Problems   No resolved problems to display.        Brief Hospital Course to date:  Maxim Henson is a 74 y.o. male with history of dementia, aphasia, hypothyroidism, prior PE/DVT here s/p falls. He was found to have chest wall hematoma.  Overnight, pt had acute respiratory failure and is now on Bipap and unable to wean.  Family has decided on DNR/DNI and are contemplating hospice/comfort measures.    Acute Respiratory Failure  Suspect Aspiration PNA  -- required Bipap, weaned to 3L BNC  -- now off ABX   -- cannot r/o PE due to being unable to tolerate CTA, however, unable to add anticoagulation back due to below. Low suspicion, though, as he was anticoagulated up until admission.   -- would avoid bipap if possible, added Morphine PRN air hunger as per family wishes.   Goal is comfort.     Hypernatremia  -volume depletion, s/p IVFs    Falls  -with bruising  -R pectoralis muscle hematoma  -weakness, frequent hypotension noted on prior cardiology/neurology notes  -likely should DC further AC use    Anemia  -PRBC x 2 ordered/given   -possibly due to falls/hematomas  -monitor GI output given below    Stercoral proctitis  -bowel regimen    Advanced Alzheimer's  -progressive aphasia per neurology notes  -vascular Parkinsonian features as well  -fall risk  -continue home meds as able    Dysphagia  -- SLP has seen, diet as per their instructions.     Hypothyroidism  -synthroid    Bacteruria  -urine culture not sent on admission, now off ABX     GOC  -- consulted Hospice. Plan is LTACH with hospice   -- DNR/DNI  -- discussed with wife and family friend at bedside on 7/26, they did not want Bipap on if it made him uncomfortable.  They were in agreement with comfort measures/no escalation of care and stopping ABX, telemetry.  I ordered Morphine PRN air hunger.  Pt appears improved today and is NC.  Hopefully we can get him to LTACH facility soon with outpatient hospice.        Hx of PE/DVT  -hold xarelto        Expected Discharge Location and Transportation: home with home hospice  Expected Discharge   Expected Discharge Date: 7/26/2025; Expected Discharge Time:      VTE Prophylaxis:  Pharmacologic & mechanical VTE prophylaxis orders are present.         AM-PAC 6 Clicks Score (PT): 6 (07/27/25 0900)    CODE STATUS:   Code Status and Medical Interventions: No CPR (Do Not Attempt to Resuscitate); Comfort Measures   Ordered at: 07/27/25 0923     Code Status (Patient has no pulse and is not breathing):    No CPR (Do Not Attempt to Resuscitate)     Medical Interventions (Patient has pulse or is breathing):    Comfort Measures       Queta Larose MD  07/27/25

## 2025-07-27 NOTE — NURSING NOTE
Comfort measures order placed, on room air now,     Pt has retroperitoneal hemorrhage and chest wall hematoma-provider aware     Pt refuses to swallow morning meds    PRN morphine order edited to include use for pain  IV ativan added    Wife visited, helped feed patient lunch.  Full bath given, PRN morphine helped with nonverbal indicators of pain    BP 73/42

## 2025-07-28 NOTE — CASE MANAGEMENT/SOCIAL WORK
Continued Stay Note  [facility]     Patient Name: Maxim Henson  MRN: [id number]  Today's Date: 7/28/2025    Admit Date: 7/21/2025    Plan: LTC   Discharge Plan       Row Name 07/28/25 1229       Plan    Plan LTC    Plan Comments Discussed in MDR. No family at bedside during bedside rounding. Messafe left for facilities to follow up with placement. CM will cont to follow.    Final Discharge Disposition Code 04 - intermediate care facility                   Discharge Codes    No documentation.                 Expected Discharge Date and Time       Expected Discharge Date Expected Discharge Time    Jul 29, 2025               Elisa Berry RN

## 2025-07-28 NOTE — PROGRESS NOTES
[facility] Medicine Services  PROGRESS NOTE    Patient Name: Maxim Henson  : 1950  MRN: [id number]    Date of Admission: 2025  Primary Care Physician: Lynn Orozco APRN    Subjective   Subjective     CC: S/P Falls    HPI: No new issues overnight, sleeping with no family at bedside this am.     Objective   Objective     Vital Signs:   Temp:  [97.9 °F (36.6 °C)-98 °F (36.7 °C)] 97.9 °F (36.6 °C)  Heart Rate:  [60-81] 71  Resp:  [16-18] 16  BP: (73-94)/(42-75) 92/58  Flow (L/min) (Oxygen Therapy):  [3-6] 3     Physical Exam:  Constitutional: non-responsive, sleeping,  on 3L BNC  HENT: NCAT, mucous membranes moist  Respiratory: Clear to auscultation bilaterally, respiratory effort normal   Cardiovascular: RRR, no murmurs, rubs, or gallops  Gastrointestinal: Positive bowel sounds, soft, nontender, nondistended  Musculoskeletal: No bilateral ankle edema, now with large flank ecchymosis on R   Psychiatric: WOO  Neurologic: WOO  Skin: No rashes, R chest wall hematoma     Results Reviewed:  LAB RESULTS:      Lab 25  1243 25  0411 25  2340 25  1950 25  1502 25  1405 25  0753 25  1206 25  0834 25  0037 25  2331 25  0944 25  0313 25  0000 25  1628 25  1432   WBC  --   --   --   --   --   --  9.18  --  9.76  --  6.36  --  5.68  --   --  8.35   HEMOGLOBIN 9.2* 9.2* 10.6* 10.2* 9.4*   < > 11.1*   < > 9.7*  --  8.8*   < > 6.8*   < >  --  8.4*   HEMATOCRIT 29.4* 28.3* 33.0* 31.3* 28.9*   < > 34.2*   < > 30.8*  --  27.5*   < > 22.6*   < >  --  27.0*   PLATELETS  --   --   --   --   --   --  251  --  226  --  197  --  205  --   --  244   NEUTROS ABS  --   --   --   --   --   --  7.41*  --   --   --  4.76  --  4.08  --   --  6.68   IMMATURE GRANS (ABS)  --   --   --   --   --   --  0.04  --   --   --  0.03  --  0.02  --   --  0.04   LYMPHS ABS  --   --   --   --   --   --  0.74  --   --   --  0.72   --  0.72  --   --  0.68*   MONOS ABS  --   --   --   --   --   --  0.85  --   --   --  0.73  --  0.74  --   --  0.85   EOS ABS  --   --   --   --   --   --  0.11  --   --   --  0.09  --  0.09  --   --  0.05   MCV  --   --   --   --   --   --  95.5  --  96.9  --  97.2*  --  103.7*  --   --  102.3*   CRP  --   --   --   --   --   --   --   --   --   --   --   --   --   --   --  6.42*   PROCALCITONIN  --   --   --   --   --   --   --   --   --   --  0.11  --   --   --   --  0.12   LACTATE  --   --   --   --   --   --   --   --   --   --  0.9  --   --   --   --  1.2   D DIMER QUANT  --   --   --   --   --   --   --   --   --  1.91*  --   --   --   --   --   --    HSTROP T  --   --   --   --   --   --   --   --   --   --   --   --   --   --  115* 142*    < > = values in this interval not displayed.         Lab 07/26/25  1243 07/26/25  0758 07/26/25  0411 07/25/25  2340 07/25/25  1950 07/24/25  1406 07/24/25  0753 07/23/25  1545 07/23/25  1207 07/23/25  0834 07/22/25  2331 07/22/25  2220 07/22/25  0944 07/22/25  0313 07/21/25  2212 07/21/25  1905 07/21/25  1432   SODIUM 140 138 139 139 140   < > 139   < > 143 143   < > 145  145   < > 149*   < > 149* 152*   POTASSIUM  --   --   --   --   --   --  4.1  --  4.6 4.8  --  3.1*  --  3.2*  --  3.5 3.7   CHLORIDE  --   --   --   --   --   --  109*  --   --  114*  --  117*  --  119*  --  117* 118*   CO2  --   --   --   --   --   --  20.0*  --   --  22.1  --  21.0*  --  24.0  --  26.0 24.8   ANION GAP  --   --   --   --   --   --  10.0  --   --  6.9  --  7.0  --  6.0  --  6.0 9.2   BUN  --   --   --   --   --   --  8.5  --   --  8.7  --  8.9  --  12.0  --  14.4 15.1   CREATININE  --   --   --   --   --   --  0.74*  --   --  0.80  --  0.67*  --  0.74*  --  0.76 0.81   EGFR  --   --   --   --   --   --  93.9  --   --  91.7  --  96.8  --  93.9  --  93.2 91.4   GLUCOSE  --   --   --   --   --   --  78  --   --  86  --  101*  --  90  --  96 97   CALCIUM  --   --   --   --   --   --   8.4*  --   --  8.3*  --  7.8*  --  7.9*  --  8.0* 8.7   MAGNESIUM  --   --   --   --   --   --   --   --   --   --   --  2.2  --   --   --   --  2.5*   TSH  --   --   --   --   --   --   --   --   --   --   --   --   --   --   --   --  17.600*    < > = values in this interval not displayed.         Lab 07/23/25  0834 07/22/25  2220 07/21/25  1432   TOTAL PROTEIN 5.1* 4.4* 5.2*   ALBUMIN 2.6* 2.3* 2.7*   GLOBULIN 2.5 2.1 2.5   ALT (SGPT) 30 30 41   AST (SGOT) 33 35 45*   BILIRUBIN 0.8 0.6 0.6   ALK PHOS 61 51 61         Lab 07/21/25  1628 07/21/25  1432   PROBNP  --  345.0   HSTROP T 115* 142*             Lab 07/21/25  2059 07/21/25  1905   IRON 17* 14*   IRON SATURATION (TSAT)  --  7*   TIBC  --  188*   TRANSFERRIN  --  126*   FERRITIN  --  248.00   FOLATE 4.19*  --    VITAMIN B 12 1,385*  --    ABO TYPING O  --    RH TYPING Positive  --    ANTIBODY SCREEN Negative  --          Lab 07/23/25  0038   PH, ARTERIAL 7.452*   PCO2, ARTERIAL 36.5   PO2 ART 51.4*   FIO2 44   HCO3 ART 25.4   BASE EXCESS ART 1.5   CARBOXYHEMOGLOBIN 1.7     Brief Urine Lab Results  (Last result in the past 365 days)        Color   Clarity   Blood   Leuk Est   Nitrite   Protein   CREAT   Urine HCG        07/21/25 1439 Yellow   Clear   Negative   Trace   Negative   Trace                   Microbiology Results Abnormal       None            No radiology results from the last 24 hrs          Current medications:  Scheduled Meds:levothyroxine, 125 mcg, Oral, Q AM  memantine, 10 mg, Oral, Q12H  mirtazapine, 15 mg, Oral, Nightly  OLANZapine zydis, 5 mg, Oral, Nightly  senna-docusate sodium, 2 tablet, Oral, BID  sodium chloride, 500 mL, Intravenous, Once  sodium chloride, 10 mL, Intravenous, Q12H      Continuous Infusions:       PRN Meds:.  acetaminophen **OR** acetaminophen **OR** acetaminophen    senna-docusate sodium **AND** polyethylene glycol **AND** bisacodyl **AND** bisacodyl    diazePAM    glucagon (human recombinant)     ipratropium-albuterol    Morphine    sodium chloride    sodium chloride    Assessment & Plan   Assessment & Plan     Active Hospital Problems    Diagnosis  POA    **Hypernatremia [E87.0]  Yes    Alzheimer dementia [G30.9, F02.80]  Yes    Hypocalcemia [E83.51]  Yes    Bacteriuria [R82.71]  Yes    Macrocytic anemia [D53.9]  Yes    Hypothyroidism (acquired) [E03.9]  Yes    History of pulmonary embolism [Z86.711]  Unknown      Resolved Hospital Problems   No resolved problems to display.        Brief Hospital Course to date:  Maxim Henson is a 74 y.o. male with history of dementia, aphasia, hypothyroidism, prior PE/DVT here s/p falls. He was found to have chest wall hematoma.  Overnight, pt had acute respiratory failure and is now on Bipap and unable to wean.  Family has decided on DNR/DNI and are contemplating hospice/comfort measures.    Acute Respiratory Failure  Suspect Aspiration PNA  -- required Bipap, weaned to 3L BNC.   -- now off ABX   -- cannot r/o PE due to being unable to tolerate CTA, however, unable to add anticoagulation back due to below. Low suspicion, though, as he was anticoagulated up until admission.   -- would avoid bipap if possible, added Morphine PRN air hunger as per family wishes.  Goal is comfort.     Hypernatremia  -volume depletion, s/p IVFs    Falls  -with bruising  -R pectoralis muscle hematoma  -weakness, frequent hypotension noted on prior cardiology/neurology notes  -should DC further AC use    Anemia  -PRBC x 2 ordered/given   -possibly due to falls/hematomas    Stercoral proctitis  -bowel regimen    Advanced Alzheimer's  -progressive aphasia per neurology notes  -vascular Parkinsonian features as well  -fall risk  -continue home meds as able    Dysphagia  -- SLP has seen, diet as per their instructions.     Hypothyroidism  -synthroid    Bacteruria  -urine culture not sent on admission, now off ABX     GOC  -- consulted Hospice. Plan is LTACH with hospice   -- DNR/DNI  -- discussed with  wife and family friend at bedside on 7/26, they did not want Bipap on if it made him uncomfortable.  They were in agreement with comfort measures/no escalation of care and stopping ABX, telemetry.  I ordered Morphine PRN air hunger.  Pt appears improved today and is on NC.  Hopefully we can get him to LTACH facility soon with outpatient hospice. May be inpatient hospice appropriate if further respiratory decline similar to Saturday, 7/26.       Hx of PE/DVT  -hold xarelto        Expected Discharge Location and Transportation: LTACH with outpatient hospice vs inpatient hospice  Expected Discharge   Expected Discharge Date: 7/29/2025; Expected Discharge Time:      VTE Prophylaxis:  Pharmacologic & mechanical VTE prophylaxis orders are present.         AM-PAC 6 Clicks Score (PT): 6 (07/28/25 0800)    CODE STATUS:   Code Status and Medical Interventions: No CPR (Do Not Attempt to Resuscitate); Comfort Measures   Ordered at: 07/27/25 0923     Code Status (Patient has no pulse and is not breathing):    No CPR (Do Not Attempt to Resuscitate)     Medical Interventions (Patient has pulse or is breathing):    Comfort Measures       Queta Larose MD  07/28/25

## 2025-07-28 NOTE — PROGRESS NOTES
"Palliative Care Daily Progress Note     Referring: Radha Yarbrough    C/C: none per pt    S: Medical record reviewed. Events noted. Resp changes over weekend prompted change to comfort measures only.  Now back to [place] O2 only.  No signs of distress.  No family present when seen. Has been taking some oral meds.     ROS: pt unable    O: Code Status:   Code Status and Medical Interventions: No CPR (Do Not Attempt to Resuscitate); Comfort Measures   Ordered at: 07/27/25 0923     Code Status (Patient has no pulse and is not breathing):    No CPR (Do Not Attempt to Resuscitate)     Medical Interventions (Patient has pulse or is breathing):    Comfort Measures      Advanced Directives: Advance Directive Status: Patient has advance directive, copy requested   Goals of Care: Ongoing.   Palliative Performance Scale Score: 20%     BP 92/58 (BP Location: Right arm, Patient Position: Lying)   Pulse 71   Temp 97.9 °F (36.6 °C) (Axillary)   Resp 16   Ht 177.8 cm (70\")   Wt 63 kg (139 lb)   SpO2 98%   BMI 19.94 kg/m²     Intake/Output Summary (Last 24 hours) at 7/28/2025 1149  Last data filed at 7/28/2025 0800  Gross per 24 hour   Intake 5 ml   Output --   Net 5 ml       Physical Exam:    General Appearance:    NAD   HEENT:    NC/AT, EOMI, anicteric, MMM, face relaxed   Neck:   supple, trachea midline, no JVD   Lungs:     CTA bilat, diminished in bases; respirations regular, even     and unlabored    Heart:    RRR, normal S1 and S2, no M/R/G   Abdomen:     Normal bowel sounds, soft, nontender, nondistended   G/U:   Deferred   MSK/Extremities:   No clubbing , cyanosis or edema, No wasting   Pulses:   Pulses palpable and equal bilaterally   Skin:   Warm, dry, no mottling   Neurologic:   Did not respond to exam   Psych:   Calm       Current Medications:      Current Facility-Administered Medications:     acetaminophen (TYLENOL) tablet 650 mg, 650 mg, Oral, Q4H PRN, 650 mg at 07/26/25 0831 **OR** acetaminophen (TYLENOL) 160 MG/5ML " oral solution 650 mg, 650 mg, Oral, Q4H PRN **OR** acetaminophen (TYLENOL) suppository 650 mg, 650 mg, Rectal, Q4H PRN, Kelly, Elizabeth, APRN, 650 mg at 07/23/25 2023    sennosides-docusate (PERICOLACE) 8.6-50 MG per tablet 2 tablet, 2 tablet, Oral, BID, 2 tablet at 07/28/25 0919 **AND** polyethylene glycol (MIRALAX) packet 17 g, 17 g, Oral, Daily PRN **AND** bisacodyl (DULCOLAX) EC tablet 5 mg, 5 mg, Oral, Daily PRN **AND** bisacodyl (DULCOLAX) suppository 10 mg, 10 mg, Rectal, Daily PRN, Kelly, Elizabeth, APRN    diazePAM (VALIUM) injection 5 mg, 5 mg, Intravenous, Q6H PRN, Queta Larose MD    glucagon (GLUCAGEN) injection 1 mg, 1 mg, Intramuscular, Q15 Min PRN, Radha Yarbrough MD    ipratropium-albuterol (DUO-NEB) nebulizer solution 3 mL, 3 mL, Nebulization, Q4H PRN, Maxim Zaragoza MD    levothyroxine (SYNTHROID, LEVOTHROID) tablet 125 mcg, 125 mcg, Oral, Q AM, Kelly, Elizabeth, APRN, 125 mcg at 07/28/25 0515    memantine (NAMENDA) tablet 10 mg, 10 mg, Oral, Q12H, Kelly, Elizabeth, APRN, 10 mg at 07/28/25 0919    mirtazapine (REMERON) tablet 15 mg, 15 mg, Oral, Nightly, Kelly, Elizabeth, APRN, 15 mg at 07/27/25 2123    morphine injection 2 mg, 2 mg, Intravenous, Q2H PRN, Queta Larose MD, 2 mg at 07/27/25 2122    OLANZapine zydis (zyPREXA) disintegrating tablet 5 mg, 5 mg, Oral, Nightly, Kelly, Elizabeth, APRN, 5 mg at 07/27/25 2123    sodium chloride 0.9 % bolus 500 mL, 500 mL, Intravenous, Once, Radha Yarbrough MD, Stopped at 07/21/25 2140    sodium chloride 0.9 % flush 10 mL, 10 mL, Intravenous, Q12H, Kelly, Elizabeth, APRN, 10 mL at 07/28/25 0919    sodium chloride 0.9 % flush 10 mL, 10 mL, Intravenous, PRN, Kelly, Elizabeth, APRN    sodium chloride 0.9 % infusion 40 mL, 40 mL, Intravenous, PRN, Kelly, Elizabeth, APRN     Labs:   Results from last 7 days   Lab Units 07/26/25  1243 07/24/25  1405 07/24/25  0753   WBC 10*3/mm3  --   --  9.18   HEMOGLOBIN g/dL 9.2*   < > 11.1*    HEMATOCRIT % 29.4*   < > 34.2*   PLATELETS 10*3/mm3  --   --  251    < > = values in this interval not displayed.     Results from last 7 days   Lab Units 07/26/25  1243 07/24/25  1406 07/24/25  0753 07/23/25  1207 07/23/25  0834   SODIUM mmol/L 140   < > 139   < > 143   POTASSIUM mmol/L  --   --  4.1   < > 4.8   CHLORIDE mmol/L  --   --  109*  --  114*   CO2 mmol/L  --   --  20.0*  --  22.1   BUN mg/dL  --   --  8.5  --  8.7   CREATININE mg/dL  --   --  0.74*  --  0.80   CALCIUM mg/dL  --   --  8.4*  --  8.3*   BILIRUBIN mg/dL  --   --   --   --  0.8   ALK PHOS U/L  --   --   --   --  61   ALT (SGPT) U/L  --   --   --   --  30   AST (SGOT) U/L  --   --   --   --  33   GLUCOSE mg/dL  --   --  78  --  86    < > = values in this interval not displayed.     Imaging Results (Last 72 Hours)       ** No results found for the last 72 hours. **                  Diagnostics: Reviewed    A:     Hypernatremia    Alzheimer dementia    Hypocalcemia    Bacteriuria    Macrocytic anemia    Hypothyroidism (acquired)    History of pulmonary embolism       Impression:  R pectoral hematoma - resolving slowly  Alzheimer's dementia with agitation  Stercoral proctitis  Hypernatremia  Afib  Gout hx  Hypoalbuminemia - 2.7  Hx psoriatic arthritis    Symptoms:   AMS  Debility       P:   As already changed through documentation and orders to comfort measures, adjusted orders to better reflect same.  Monitor for further needs. Palliative Care Team will continue to follow patient.     Bijal Hernandez MD, 7/28/2025, 11:49 EDT

## 2025-07-29 PROBLEM — F02.80 ALZHEIMER'S DEMENTIA: Status: ACTIVE | Noted: 2025-01-01

## 2025-07-29 PROBLEM — G30.9 ALZHEIMER'S DEMENTIA: Status: ACTIVE | Noted: 2025-01-01

## 2025-07-29 NOTE — DISCHARGE PLACEMENT REQUEST
"Elie Roy (74 y.o. Male)       Date of Birth   1950    Social Security Number       [place]   [address] [place] [place] [address]    Home Phone   [phone]    MRN   [id number]       Baptism   None    Marital Status                               Admission Date   7/21/2025    Admission Type   Emergency    Admitting Provider   Laurie Moreno MD    Attending Provider   Laurie Moreno MD    Department, Room/Bed   [address], S325/1       Discharge Date       Discharge Disposition       Discharge Destination                                 Attending Provider: Laurie Moreno MD    Allergies: Cefdinir, Sulfamethoxazole-trimethoprim    Isolation: None   Infection: None   Code Status: No CPR    Ht: 177.8 cm (70\")   Wt: 63 kg (139 lb)    Admission Cmt: None   Principal Problem: Hypernatremia [E87.0]                   Active Insurance as of 7/21/2025       Primary Coverage       Payor Plan Insurance Group Employer/Plan Group    [employer] MEDICARE REPLACEMENT [facility] Medicare Advantage GROUP PPO [id number]       Payor Plan Address Payor Plan Phone Number Payor Plan Fax Number Effective Dates    [address] [address]   1/1/2025 - None Entered    [facility] [id number]         Subscriber Name Subscriber Birth Date Member ID       ELIE ROY 1950 [id number]                     Emergency Contacts        (Rel.) Home Phone Work Phone Mobile Phone    WALDO ROY (Spouse) [phone] -- [phone]              Emergency Contact Information       Name Relation Home Work Mobile    WALDO ROY Spouse [phone]          Other Contacts    None on File       Insurance Information                  [employer] MEDICARE REPLACEMENT/[facility] Medicare Advantage GROUP PPO Phone: --    Subscriber: Elie Roy Subscriber#: [id number]    Group#: [id number] Precert#: [id number]    Authorization#: [id number] Effective Date: --             History & Physical        Kelly, " "TATYANA Johnson at 25       Attestation signed by Radha Yarbrough MD at 25 4519      I have reviewed this documentation and agree. We will monitor sodium levels and adjust maintenance fluids. Overall poor prognosis with advanced dementia. Per undersigned NP, family is willing to meet with Palliative Care which I feel is appropriate at this time.                           [facility] Medicine Services  HISTORY AND PHYSICAL    Patient Name: Maxim Henson  : 1950  MRN: [id number]  Primary Care Physician: Angel Preston MD  Date of admission: 2025    Subjective  Subjective     Chief Complaint:  Frequent falls, lower leg edema    HPI:  Maxim Henson is a 74 y.o. male with a past medical history significant for alzheimer dementia with agitation, atrial fibrillation, pulmonary embolism and gout presents to the ED from [facility] nursing facility due to frequent falls and lower extremity edema.  Patient has no family at bedside and has severe dementia therefore HPI is limited.  Per report patient is wheelchair bound and tries to get up frequently and falls.  Spoke to Wife by phone and she reports patient has been at [facility] for about three weeks now.  She had taken care of him for about a year which was difficult.  About a week ago she reports patient was taken to the ED for a fall and ultimately suffered a head laceration needing staples.  Today she received a phone call from staff saying the patient had fallen and had a \"big bruise to his chest.\"          Review of Systems   Unable to perform ROS: Dementia                Personal History     Past Medical History:   Diagnosis Date    Alzheimer dementia with agitation     Anxiety     Atrial fibrillation     Dementia     Gout              No past surgical history on file.    Family History:  family history is not on file.     Social History:    Social History     Social History Narrative    Not on file "       Medications:  OLANZapine zydis, levothyroxine, memantine, mirtazapine, rivaroxaban, and sodium-potassium-magnesium sulfates    Allergies   Allergen Reactions    Cefdinir Other (See Comments)     Unknown    Sulfamethoxazole-Trimethoprim Other (See Comments)     Unknown       Objective  Objective     Vital Signs:   Temp:  [99.1 °F (37.3 °C)] 99.1 °F (37.3 °C)  Heart Rate:  [73-96] 96  Resp:  [18] 18  BP: (104-144)/(56-99) 144/99    Physical Exam  Vitals and nursing note reviewed.   Constitutional:       General: He is not in acute distress.     Appearance: Normal appearance. He is not ill-appearing, toxic-appearing or diaphoretic.   HENT:      Head: Normocephalic and atraumatic.      Nose: Nose normal.      Mouth/Throat:      Mouth: Mucous membranes are dry.      Pharynx: Oropharynx is clear.   Eyes:      Extraocular Movements: Extraocular movements intact.      Conjunctiva/sclera: Conjunctivae normal.      Pupils: Pupils are equal, round, and reactive to light.   Cardiovascular:      Rate and Rhythm: Normal rate and regular rhythm.      Pulses: Normal pulses.      Heart sounds: Normal heart sounds.   Pulmonary:      Effort: Pulmonary effort is normal.      Breath sounds: Normal breath sounds.   Abdominal:      General: Bowel sounds are normal. There is no distension.      Palpations: Abdomen is soft. There is no mass.      Tenderness: There is no abdominal tenderness. There is no right CVA tenderness, left CVA tenderness, guarding or rebound.      Hernia: No hernia is present.   Musculoskeletal:         General: No swelling, tenderness, deformity or signs of injury.      Cervical back: Normal range of motion and neck supple.      Right lower leg: Edema present.      Left lower leg: Edema present.   Skin:     General: Skin is warm and dry.      Comments: Scattered bruising to arms, legs and hematoma to chest.  Abrasions to bilateral arms.    Neurological:      Mental Status: He is alert. Mental status is at  baseline.   Psychiatric:      Comments: Non-communicative             Result Review:  I have personally reviewed the results from the time of this admission to 7/21/2025 19:58 EDT and agree with these findings:  [x]  Laboratory list / accordion  [x]  Microbiology  [x]  Radiology  []  EKG/Telemetry   []  Cardiology/Vascular   []  Pathology  []  Old records  []  Other:  Most notable findings include:     LAB RESULTS:      Lab 07/21/25  1432   WBC 8.35   HEMOGLOBIN 8.4*   HEMATOCRIT 27.0*   PLATELETS 244   NEUTROS ABS 6.68   IMMATURE GRANS (ABS) 0.04   LYMPHS ABS 0.68*   MONOS ABS 0.85   EOS ABS 0.05   .3*   CRP 6.42*   PROCALCITONIN 0.12   LACTATE 1.2   CK TOTAL 702*         Lab 07/21/25  1905 07/21/25  1432   SODIUM 149* 152*   POTASSIUM 3.5 3.7   CHLORIDE 117* 118*   CO2 26.0 24.8   ANION GAP 6.0 9.2   BUN 14.4 15.1   CREATININE 0.76 0.81   EGFR 93.2 91.4   GLUCOSE 96 97   CALCIUM 8.0* 8.7   MAGNESIUM  --  2.5*   TSH  --  17.600*         Lab 07/21/25  1432   TOTAL PROTEIN 5.2*   ALBUMIN 2.7*   GLOBULIN 2.5   ALT (SGPT) 41   AST (SGOT) 45*   BILIRUBIN 0.6   ALK PHOS 61         Lab 07/21/25  1628 07/21/25  1432   PROBNP  --  345.0   HSTROP T 115* 142*                 Brief Urine Lab Results  (Last result in the past 365 days)        Color   Clarity   Blood   Leuk Est   Nitrite   Protein   CREAT   Urine HCG        07/21/25 1439 Yellow   Clear   Negative   Trace   Negative   Trace                 Microbiology Results (last 10 days)       Procedure Component Value - Date/Time    Respiratory Panel PCR w/COVID-19(SARS-CoV-2) MICHAEL/RICKY/QASIM/PAD/COR/FRANNIE In-House, NP Swab in UTM/VTM, 2 HR TAT - Swab, Nasopharynx [[id number]]  (Normal) Collected: 07/21/25 1504    Lab Status: Final result Specimen: Swab from Nasopharynx Updated: 07/21/25 1604     ADENOVIRUS, PCR Not Detected     Coronavirus 229E Not Detected     Coronavirus HKU1 Not Detected     Coronavirus NL63 Not Detected     Coronavirus OC43 Not Detected     COVID19 Not  Detected     Human Metapneumovirus Not Detected     Human Rhinovirus/Enterovirus Not Detected     Influenza A PCR Not Detected     Influenza B PCR Not Detected     Parainfluenza Virus 1 Not Detected     Parainfluenza Virus 2 Not Detected     Parainfluenza Virus 3 Not Detected     Parainfluenza Virus 4 Not Detected     RSV, PCR Not Detected     Bordetella pertussis pcr Not Detected     Bordetella parapertussis PCR Not Detected     Chlamydophila pneumoniae PCR Not Detected     Mycoplasma pneumo by PCR Not Detected    Narrative:      In the setting of a positive respiratory panel with a viral infection PLUS a negative procalcitonin without other underlying concern for bacterial infection, consider observing off antibiotics or discontinuation of antibiotics and continue supportive care. If the respiratory panel is positive for atypical bacterial infection (Bordetella pertussis, Chlamydophila pneumoniae, or Mycoplasma pneumoniae), consider antibiotic de-escalation to target atypical bacterial infection.            CT Chest Without Contrast Diagnostic  Result Date: 7/21/2025  CT CHEST WO CONTRAST DIAGNOSTIC, CT ABDOMEN PELVIS WO CONTRAST Date of Exam: 7/21/2025 3:16 PM EDT Indication: Anterior chest bruising, frequent falls. Comparison: None available. Technique: Axial CT images were obtained of the chest abdomen and pelvis without contrast administration.  Reconstructed coronal and sagittal images were also obtained. Automated exposure control and iterative construction methods were used. Findings: CT CHEST: MEDIASTINUM: There is a moderate size sliding hiatal hernia. There are aortic valvular calcifications aortic and heart size are normal. No mass nor pericardial effusion. CORONARY ARTERIES: There is calcified atherosclerotic disease. LUNGS: There is minimal right basal airspace disease, likely atelectasis. No additional consolidation. No significant nodule nor interstitial changes. PLEURAL SPACE: Trace right pleural  effusion. LYMPH NODES: No pathologically enlarged thoracic nodes. There is a heterogeneous mass centered within the right pectoralis major muscle which is only partially visualized given field-of-view. This measures at least 15 cm in transverse dimension by 5 cm in AP dimension. This likely represents an intramuscular hematoma. There is surrounding subcutaneous edema/ecchymosis. Correlate with history as neoplasm not excluded. CT ABDOMEN AND PELVIS:  LIVER:  Unremarkable parenchyma without focal lesion. BILIARY/GALLBLADDER:  Unremarkable SPLEEN:  Unremarkable PANCREAS:  Unremarkable ADRENAL:  Unremarkable KIDNEYS:  Unremarkable parenchyma with no solid mass identified. No obstruction.  No calculus identified. GASTROINTESTINAL/MESENTERY: There is mural thickening with moderate fecal load involving the rectum. There is associated perirectal/presacral edema. 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 Correlate with history and symptoms. AORTA/IVC: IVC filter is present on imaging study. It is recommended that all patients with IVC filters in place have an active management care plan to monitor their IVC filter. If a care plan is not in place, patient should have a non emergent referral to an interventional specialist such as interventional radiology or other interventional vascular specialist for establishment of an IVC filter management care plan. RETROPERITONEUM/LYMPH NODES:  Unremarkable REPRODUCTIVE:  Unremarkable BLADDER:  Unremarkable OSSEUS STRUCTURES:  Typical for age with no acute process identified. There is right anterior lateral abdominal wall subcutaneous edema versus ecchymosis.     Impression: Impression: 1.There is a heterogeneous mass centered within the right pectoralis major muscle which is only partially visualized given field-of-view. This measures at least 15 cm in transverse dimension by 5 cm in AP dimension. This likely represents an intramuscular hematoma. There is surrounding subcutaneous  edema/ecchymosis. Correlate with history as neoplasm not excluded. 2.There is mural thickening with moderate fecal load involving the rectum. There is associated perirectal/presacral edema. 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 Correlate with history and symptoms. 3.There is right anterior lateral abdominal wall subcutaneous edema versus ecchymosis. 4.Trace right pleural effusion. 5.Moderate size sliding hiatal hernia. Electronically Signed: Clark Espinoza MD  7/21/2025 3:48 PM EDT  Workstation ID: [id number]    CT Abdomen Pelvis Without Contrast  Result Date: 7/21/2025  CT CHEST WO CONTRAST DIAGNOSTIC, CT ABDOMEN PELVIS WO CONTRAST Date of Exam: 7/21/2025 3:16 PM EDT Indication: Anterior chest bruising, frequent falls. Comparison: None available. Technique: Axial CT images were obtained of the chest abdomen and pelvis without contrast administration.  Reconstructed coronal and sagittal images were also obtained. Automated exposure control and iterative construction methods were used. Findings: CT CHEST: MEDIASTINUM: There is a moderate size sliding hiatal hernia. There are aortic valvular calcifications aortic and heart size are normal. No mass nor pericardial effusion. CORONARY ARTERIES: There is calcified atherosclerotic disease. LUNGS: There is minimal right basal airspace disease, likely atelectasis. No additional consolidation. No significant nodule nor interstitial changes. PLEURAL SPACE: Trace right pleural effusion. LYMPH NODES: No pathologically enlarged thoracic nodes. There is a heterogeneous mass centered within the right pectoralis major muscle which is only partially visualized given field-of-view. This measures at least 15 cm in transverse dimension by 5 cm in AP dimension. This likely represents an intramuscular hematoma. There is surrounding subcutaneous edema/ecchymosis. Correlate with history as neoplasm not excluded. CT ABDOMEN AND PELVIS:  LIVER:  Unremarkable parenchyma without focal  lesion. BILIARY/GALLBLADDER:  Unremarkable SPLEEN:  Unremarkable PANCREAS:  Unremarkable ADRENAL:  Unremarkable KIDNEYS:  Unremarkable parenchyma with no solid mass identified. No obstruction.  No calculus identified. GASTROINTESTINAL/MESENTERY: There is mural thickening with moderate fecal load involving the rectum. There is associated perirectal/presacral edema. 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 Correlate with history and symptoms. AORTA/IVC: IVC filter is present on imaging study. It is recommended that all patients with IVC filters in place have an active management care plan to monitor their IVC filter. If a care plan is not in place, patient should have a non emergent referral to an interventional specialist such as interventional radiology or other interventional vascular specialist for establishment of an IVC filter management care plan. RETROPERITONEUM/LYMPH NODES:  Unremarkable REPRODUCTIVE:  Unremarkable BLADDER:  Unremarkable OSSEUS STRUCTURES:  Typical for age with no acute process identified. There is right anterior lateral abdominal wall subcutaneous edema versus ecchymosis.     Impression: Impression: 1.There is a heterogeneous mass centered within the right pectoralis major muscle which is only partially visualized given field-of-view. This measures at least 15 cm in transverse dimension by 5 cm in AP dimension. This likely represents an intramuscular hematoma. There is surrounding subcutaneous edema/ecchymosis. Correlate with history as neoplasm not excluded. 2.There is mural thickening with moderate fecal load involving the rectum. There is associated perirectal/presacral edema. 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 Correlate with history and symptoms. 3.There is right anterior lateral abdominal wall subcutaneous edema versus ecchymosis. 4.Trace right pleural effusion. 5.Moderate size sliding hiatal hernia. Electronically Signed: Clark Espinoza MD  7/21/2025 3:48 PM EDT   Workstation ID: [id number]    CT Head Without Contrast  Result Date: 7/21/2025  CT HEAD WO CONTRAST Date of Exam: 7/21/2025 3:16 PM EDT Indication: frequent falls. Comparison: 7/9/2025 Technique: Axial CT images were obtained of the head without contrast administration.  Automated exposure control and iterative construction methods were used. Findings: There is no evidence of acute territorial infarction. There is no acute intracranial hemorrhage. There are no extra-axial collections. Ventricles and CSF spaces are symmetrically prominent. No mass effect nor hydrocephalus. Brain parenchyma appears unchanged.  Paranasal sinuses and mastoid air cells are adequately aerated.  Osseous structures and orbits appear intact.     Impression: Impression: 1.No acute intracranial process is identified. 2.Generalized atrophy. Electronically Signed: Clark Espinoza MD  7/21/2025 3:36 PM EDT  Workstation ID: [id number]    XR Chest 1 View  Result Date: 7/21/2025  XR CHEST 1 VW Date of Exam: 7/21/2025 2:10 PM EDT Indication: Leg edema Comparison: None available. Findings: Normal cardiomediastinal silhouette. The lungs are clear. No pleural effusion or pneumothorax. No acute osseous findings.     Impression: Impression: No acute cardiopulmonary findings. Electronically Signed: Agusto Goodman MD  7/21/2025 2:26 PM EDT  Workstation ID: [id number]          Assessment & Plan  Assessment & Plan       Hypernatremia    Alzheimer dementia    Hypocalcemia    Bacteriuria    Macrocytic anemia    Hypothyroidism (acquired)    History of pulmonary embolism      74 year old male presents to the ED from [facility] after a fall.     1) Hypernatremia  -, 149  -Approximately 1.5L water deficit   -received 1L normal saline in the ED, receiving 500 ml bolus now  -serial BMP   -TSH 17.600 Free T4 1.21  -CT head as noted above   -strict I &O's   -start D5W IVF, glucose currently 98    2) Frequent Falls       ? Right pectoralis muscle  hematoma       Wheelchair bound   -CT chest as noted above  -hold xarelto for now   -trend H&H overnight   -type and screen   -fall precautions     3) Stercoral proctitis  -CT abdomen and pelvis noted above  -mineral oil enema given in the ED  -continue bowel regimen overnight     4) Macrocytic anemia  -H&H 8.4/27.0  -trend overnight   -type and screen   -anemia studies     5) Advanced alzheimers dementia      Malnourished   -at baseline not communicative   -consult nutrition in am   -continue IVF   -consider albumin   -check ionized calcium   -consult palliative in am   -on remeron, namenda, zyprexa     6) Hypothyroidism  -TSH and Free T4 noted above  -continue synthroid     7) Bacteruria  -UA above  -will check urine cultures   -strict I &O's  -check post void residual        DVT prophylaxis:  scds    CODE STATUS:  DNR/DNI, confirmed with Wife by phone        Expected Discharge    This note has been completed as part of a split-shared workflow.     Signature: Electronically signed by TATYANA Wheat, 07/21/25, 8:27 PM EDT.                  Electronically signed by Radha Yarbrough MD at 07/21/25 0721

## 2025-07-29 NOTE — PROGRESS NOTES
Continued Stay Note  [facility]     Patient Name: Maxim Henson  MRN: [id number]  Today's Date: 7/29/2025    Admit Date: 7/29/2025    Plan: Inpatient hospice   Discharge Plan       Row Name 07/29/25 1300       Plan    Plan Inpatient hospice    Plan Comments 20% PPS. Patient resting with eyes closed. Noted with secretions and cough that he is having difficulty clearing. Furrow to forehead. Asked nurse to give prn Morphine. Discussed inpatient hospice services with wife, Sol and she is electing this benefit. Patient approved for inpatient hospice admission for skilled nursing assessment, medication titration, and injectable medication administration for palliation of pain, dyspnea, and secretions. Dr. Moreno, Dr. Hernandez, CM Elisa, STEPHANE Barrett, and RN Kizzy aware of discharge/readmit to inpatient hospice. Patient will transfer to  when a bed is available.     Final Discharge Disposition Code 51 - hospice medical facility                     Discharge Codes    No documentation.                       Yolanda Macias RN

## 2025-07-29 NOTE — PLAN OF CARE
Goal Outcome Evaluation:  Plan of Care Reviewed With: spouse        Progress: declining  Outcome Evaluation: Pt seen at 1020; sleeping, no observable signs of discomfort, did not open eyes or respond to voice/name and light touch. Noted overnight events with fever unrelieved with Tylenol, increasing O2 requirement and progressive somnolence. Sat 84% on O2nc 3L, BP 82/57, NAD. Called pt's spouse, updated on condition. Advised that [facility] of [facility] [place] has apparently offered a bed, with hospice to follow; however, expressed concern with declining status, may be a risk of pt expiring in transit. Spouse verbalized agreement at that time to not decrease O2 setting, but nor to increase or escalate interventions, she was on her way to the hospital. Revisited at 1210, spouse present; NAD, audible pharyngeal secretions noted, monitors off in the room. Discussion with spouse, offered option of attempt to transfer to facility with hospice, knowing risk of expiring in transit, or remain at [facility] with comfort measures only, including wean of O2 supplementation and interventions to maintain comfort through the dying process. Spouse stated that she would prefer to remain at [facility], agreeable to O2 wean. Notified unit RN Kizzy, Dr. Moreno, Dr. PARUL Hernandez, and inpatient hospice team who will come and assess the pt for inpatient services. 1305: received notice from inpatient hospice team that pt has transitioned to their service.     0930: Palliative IDT discussion: MD, APRN, RN  After hours, weekends and holidays, contact Palliative Provider by calling [phone]       Problem: Palliative Care  Goal: Enhanced Quality of Life  Outcome: Adequate for Care Transition  Intervention: Optimize Function  Recent Flowsheet Documentation  Taken 7/29/2025 1353 by Nena Dallas, RN  Sensory Stimulation Regulation: quiet environment promoted  Intervention: Promote Advance Care Planning  Recent Flowsheet Documentation  Taken 7/29/2025 1353 by Burt  Nena DALY, RN  Life Transition/Adjustment: (Hospice notified for assessment for inpatient services) other (see comments)  Intervention: Optimize Psychosocial Wellbeing  Recent Flowsheet Documentation  Taken 7/29/2025 3911 by Nena Dallas, RN  Supportive Measures:   decision-making supported   goal-setting facilitated   positive reinforcement provided   verbalization of feelings encouraged  Family/Support System Care:   caregiver stress acknowledged   involvement promoted   presence promoted   self-care encouraged   support provided

## 2025-07-29 NOTE — H&P
Hospice History and Physical     Patient Name:  Maxim Henson   : 1950   Sex: male    Patient Care Team:  Lynn Orozco APRN as PCP - General (Nurse Practitioner)    Code Status: Comfort measures     Subjective     74 y.o.male presented to ED on 25 with c/o brusing on his chest following a fall at [facility].  At baseline patient lives at [facility], he is dependent for ADL's, wheelchair bound.  Multiple frequent falls over the past 3 weeks.     PMH significant for Alzheimer's dementia, a fib, recent fall with head laceration, s/p sutures, and gout.     In ED workup revealed     Hospital Course:   -Admitted to hospital medicine   -Required PRBC's for low H&H  -Palliative care consulted for clarification of goals of care.   -Spouse elected comfort focused POC.     Inpatient hospice team met with spouse Dakota at bedside.  Inpatient team reviewed inpatient hospice service, medication management, and goals of care.  Family elected comfort focused POC.    Patient admitted to inpatient hospice on 2025 with terminal diagnosis of Alzheimer's dementia [G30.9, F02.80] for management of severe pain, terminal restlessness and terminal secretions that will require injectable medications and frequent skilled nursing assessments to manage.  Prognosis poor.      Review of Systems:  Review of Systems   Unable to perform ROS: Acuity of condition       History:  Past Medical History:   Diagnosis Date    Agitation     Alzheimer dementia with agitation     Anxiety     Anxiety     Aphasia     Atrial fibrillation     Atrial fibrillation     Dementia     Dysphagia     Gout     Gout     Hypertension     Hypothyroid     Pulmonary embolism      No past surgical history on file.  Current Facility-Administered Medications   Medication Dose Route Frequency Provider Last Rate Last Admin    acetaminophen (TYLENOL) tablet 650 mg  650 mg Oral Q4H PRN Bita Larose APRN        Or    acetaminophen (TYLENOL) 160  MG/5ML oral solution 650 mg  650 mg Oral Q4H PRN Bita Larose, APRN        Or    acetaminophen (TYLENOL) suppository 650 mg  650 mg Rectal Q4H PRN Bita Larose, APRN        bisacodyl (DULCOLAX) suppository 10 mg  10 mg Rectal Daily PRN Bita Larose, APRN        diazePAM (VALIUM) injection 5 mg  5 mg Intravenous Q4H PRN Bita Larose, APRN        glycopyrrolate (ROBINUL) injection 0.4 mg  0.4 mg Intravenous Q4H PRN Bita Larose, APRN        midazolam (VERSED) injection 0.5 mg  0.5 mg Intravenous Q2H PRN Bita Larose, APRN        morphine injection 2 mg  2 mg Intravenous Q1H PRN Bita Larose, APRN        ondansetron (ZOFRAN) injection 4 mg  4 mg Intravenous Q6H PRN Bita Larose, APRN        palliative care oral rinse   Mouth/Throat Q6H Bita Larose, APRN        palliative care oral rinse   Mouth/Throat PRN Bita Larose, APRN        Polyvinyl Alcohol-Povidone PF (ARTIFICIAL TEARS) 1.4-0.6 % ophthalmic solution 1 drop  1 drop Both Eyes Q30 Min PRN Bita Larose APRN        scopolamine patch 1 mg/72 hr  1 patch Transdermal Q72H PRN Bita Larose, APRN        sodium chloride 0.9 % flush 10 mL  10 mL Intravenous Q12H Bita Larose, APRN        sodium chloride 0.9 % flush 10 mL  10 mL Intravenous PRN Bita Larose, APRN              acetaminophen **OR** acetaminophen **OR** acetaminophen    bisacodyl    diazePAM    glycopyrrolate    midazolam    Morphine    ondansetron    palliative care oral rinse    Polyvinyl Alcohol-Povidone PF    Scopolamine    sodium chloride  Allergies   Allergen Reactions    Cefdinir Other (See Comments)     Unknown    Sulfamethoxazole-Trimethoprim Other (See Comments)     Unknown     No family history on file.  Social History     Socioeconomic History    Marital status:    Tobacco Use    Smoking status: Unknown   Vaping Use    Vaping status: Never Used   Substance and Sexual Activity    Alcohol use: Defer    Drug use: Defer     Sexual activity: Defer       Objective     Vital Signs:  Temp:  [99.2 °F (37.3 °C)-101.7 °F (38.7 °C)] 99.6 °F (37.6 °C)  Heart Rate:  [78-93] 78  Resp:  [16-18] 16  BP: (80-96)/(57-69) 80/57    PPS: Palliative Performance Scale score as of 8/1/2025, 09:10 EDT is 10% based on the following measures:   Ambulation: Totally bed bound  Activity and Evidence of Disease: Unable to do any work, extensive evidence of disease  Self-Care: Total care  Intake:  Mouth care only  LOC: Drowsy or coma     Physical Exam:  Physical Exam  Vitals and nursing note reviewed. Exam conducted with a chaperone present.   Constitutional:       General: He is not in acute distress.     Appearance: He is ill-appearing.      Comments: Frail, elderly male    Cardiovascular:      Rate and Rhythm: Normal rate. Rhythm irregular.   Pulmonary:      Effort: Pulmonary effort is normal.      Breath sounds: No wheezing or rales.   Abdominal:      General: Bowel sounds are normal. There is no distension.      Tenderness: There is no abdominal tenderness. There is no guarding.   Musculoskeletal:         General: No swelling.   Skin:     General: Skin is warm.      Coloration: Skin is cyanotic and mottled.      Comments: Mottling present on left foot.  Foot cool to touch.    Neurological:      Mental Status: He is unresponsive.         Results Reviewed:  LAB RESULTS:      Lab 07/26/25  1243 07/26/25  0411 07/25/25  2340 07/25/25  1950 07/25/25  1502 07/24/25  1405 07/24/25  0753 07/23/25  1206 07/23/25  0834 07/23/25  0037 07/22/25  2331   WBC  --   --   --   --   --   --  9.18  --  9.76  --  6.36   HEMOGLOBIN 9.2* 9.2* 10.6* 10.2* 9.4*   < > 11.1*   < > 9.7*  --  8.8*   HEMATOCRIT 29.4* 28.3* 33.0* 31.3* 28.9*   < > 34.2*   < > 30.8*  --  27.5*   PLATELETS  --   --   --   --   --   --  251  --  226  --  197   NEUTROS ABS  --   --   --   --   --   --  7.41*  --   --   --  4.76   IMMATURE GRANS (ABS)  --   --   --   --   --   --  0.04  --   --   --  0.03    LYMPHS ABS  --   --   --   --   --   --  0.74  --   --   --  0.72   MONOS ABS  --   --   --   --   --   --  0.85  --   --   --  0.73   EOS ABS  --   --   --   --   --   --  0.11  --   --   --  0.09   MCV  --   --   --   --   --   --  95.5  --  96.9  --  97.2*   PROCALCITONIN  --   --   --   --   --   --   --   --   --   --  0.11   LACTATE  --   --   --   --   --   --   --   --   --   --  0.9   D DIMER QUANT  --   --   --   --   --   --   --   --   --  1.91*  --     < > = values in this interval not displayed.         Lab 07/26/25  1243 07/26/25  0758 07/26/25  0411 07/25/25  2340 07/25/25  1950 07/24/25  1406 07/24/25  0753 07/23/25  1545 07/23/25  1207 07/23/25  0834 07/22/25  2331 07/22/25  2220   SODIUM 140 138 139 139 140   < > 139   < > 143 143   < > 145  145   POTASSIUM  --   --   --   --   --   --  4.1  --  4.6 4.8  --  3.1*   CHLORIDE  --   --   --   --   --   --  109*  --   --  114*  --  117*   CO2  --   --   --   --   --   --  20.0*  --   --  22.1  --  21.0*   ANION GAP  --   --   --   --   --   --  10.0  --   --  6.9  --  7.0   BUN  --   --   --   --   --   --  8.5  --   --  8.7  --  8.9   CREATININE  --   --   --   --   --   --  0.74*  --   --  0.80  --  0.67*   EGFR  --   --   --   --   --   --  93.9  --   --  91.7  --  96.8   GLUCOSE  --   --   --   --   --   --  78  --   --  86  --  101*   CALCIUM  --   --   --   --   --   --  8.4*  --   --  8.3*  --  7.8*   MAGNESIUM  --   --   --   --   --   --   --   --   --   --   --  2.2    < > = values in this interval not displayed.         Lab 07/23/25  0834 07/22/25  2220   TOTAL PROTEIN 5.1* 4.4*   ALBUMIN 2.6* 2.3*   GLOBULIN 2.5 2.1   ALT (SGPT) 30 30   AST (SGOT) 33 35   BILIRUBIN 0.8 0.6   ALK PHOS 61 51                     Lab 07/23/25  0038   PH, ARTERIAL 7.452*   PCO2, ARTERIAL 36.5   PO2 ART 51.4*   FIO2 44   HCO3 ART 25.4   BASE EXCESS ART 1.5   CARBOXYHEMOGLOBIN 1.7     Brief Urine Lab Results  (Last result in the past 365 days)        Color    Clarity   Blood   Leuk Est   Nitrite   Protein   CREAT   Urine HCG        07/21/25 1439 Yellow   Clear   Negative   Trace   Negative   Trace                   Microbiology Results Abnormal       None              Alzheimer's dementia      Assessment & Plan     Assessment/Plan:   -Admit to inpatient hospice service 07/29/2025 with Alzheimer's dementia [G30.9, F02.80].     -Coordination of care with nursing staff and BCN IDT.     -Pain: Morphine 2 mg PRN     -Dyspnea:  Morphine as above, oxygen via NC PRN     -Nausea/Vomiting: Zofran 4 mg scheduled q 6 hr PRN       -Anxiety/Agitation: Versed 0.5 mg q 4 hrs PRN; haldol 1 mg q 4 hrs PRN      -Bowel/bladder: bisacodyl supp q day PRN     -Nutrition: Comfort diet as tolerated     -ADLs: total care     -Terminal fever: tylenol supp 650 mg q 6 hr PRN. tordal 15 mg IV q 6 hr PRN     -Terminal secretions:  May start PRN robinul/scop patch/lasix if needed     -Patient comfort: palliative oral rinse PRN, liquifilm PRN      -Goals of Care: achieve comfortable death, educate and support family through this process.           Total Visit Time: 50 min   Face to Face Time: 30 min   Total time spent includes time reviewing chart, face-to-face time, counseling patient/family/caregiver, ordering medications/tests/procedures, communicating with other health care professionals, documenting clinical information in the electronic health record, and coordination of care with facility staff and BCN IDT.      Verbal certification obtained from Dr Sepulveda who has determined patient's eligibility with terminal diagnosis of Alzheimer's dementia [G30.9, F02.80].  Medications and diagnosis determined to be related to terminal prognosis. See physician's CTI for information on eligibility determination.      Justification for care:  Patient meets criteria for acute in-patient care with required nursing assessment and interventions for symptoms with IV medications.      HUSEYIN WHITESIDE, MSN,  APRN  [facility]  Hospice and Palliative Care Nurse Practitioner  07/29/25  13:25 EDT

## 2025-07-29 NOTE — DISCHARGE SUMMARY
[facility] Medicine Services  DISCHARGE TO INPATIENT HOSPICE    Patient Name: Maxim Henson  : 1950  MRN: [id number]    Date of Admission: 2025  Date of Discharge:  2025  Primary Care Physician: Lynn Orozco APRN    Consults       Date and Time Order Name Status Description    2025  7:09 PM Inpatient Palliative Care MD Consult Completed           Hospital Course     Presenting Problem:   Hypernatremia [E87.0]    Active Hospital Problems    Diagnosis  POA   • **Hypernatremia [E87.0]  Yes   • Alzheimer dementia [G30.9, F02.80]  Yes   • Hypocalcemia [E83.51]  Yes   • Bacteriuria [R82.71]  Yes   • Macrocytic anemia [D53.9]  Yes   • Hypothyroidism (acquired) [E03.9]  Yes   • History of pulmonary embolism [Z86.711]  Unknown      Resolved Hospital Problems   No resolved problems to display.          Hospital Course:  Maxim Henson is a 74 y.o. male w severe Alzheimers dementia c/b agitation, Afib, PE who presented on     w recurrent falls and chest wall hematoma. Stay c/b aspiration PNA and hypoxia.     Family opted to transition to comfort care measures.  Transitioned to IP hospice on     Day of Discharge     HPI:   Patient comfortable appearing on my exam  Febrile, not eating or drinking  No PRNs yet required for symptoms    Vital Signs:   Temp:  [99.2 °F (37.3 °C)-101.7 °F (38.7 °C)] 99.6 °F (37.6 °C)  Heart Rate:  [78-93] 78  Resp:  [16-18] 16  BP: (80-96)/(57-69) 80/57     Physical Exam:  Ill appearing older male lying in bed  Peaceful music playing  Decreased muscle tone throughout  Left sleeping  No increased work of breathing or tachypnea present    Discharge Details     Discharge Disposition:  Transfer care to inpatient Hospice at [facility]    Time Spent on Discharge:  25 minutes    Laurie Moreno MD  25

## 2025-07-30 NOTE — PROGRESS NOTES
Hospice Progress Note    Patient Name: Maxim Henson   : 1950  Gender: male    Code Status: comfort measures    Date of Admission: 2025    Subjective:  Maxim Henson is a 74 y.o. male admitted to inpatient hospice 2025 with diagnosis of Alzheimer's dementia [G30.9, F02.80]  for symptom management.     Spouse at bedside.  Pt appears comfortable on my exam. Pt needed frequent PRN's overnight to manage pain and agitation.     - Scheduled:  Palliative oral rinse     - PRNs:  Robinul 0.4 mg x 2   Morphine 2 mg x 4       - Intake/Output  Intake & Output (last 3 days)          07 07 07 07            Urine Unmeasured Occurrence   4 x     Stool Unmeasured Occurrence   2 x                ROS:  Review of Systems   Unable to perform ROS: Acuity of condition       Reviewed current scheduled and prn medications for route, type, dose and frequency.       acetaminophen **OR** acetaminophen **OR** acetaminophen    bisacodyl    diazePAM    glycopyrrolate    midazolam    Morphine    ondansetron    palliative care oral rinse    Polyvinyl Alcohol-Povidone PF    Scopolamine    sodium chloride    Objective:   /66 (BP Location: Right arm, Patient Position: Lying)   Pulse 74   Temp 98.1 °F (36.7 °C) (Axillary)   Resp 16   SpO2 94%      PPS: Palliative Performance Scale score as of 2025, 09:19 EDT is 10% based on the following measures:   Ambulation: Totally bed bound  Activity and Evidence of Disease: Unable to do any work, extensive evidence of disease  Self-Care: Total care  Intake:  Mouth care only  LOC: Drowsy or coma     Physical Exam:  Physical Exam  Nursing note reviewed. Exam conducted with a chaperone present.   Constitutional:       General: He is not in acute distress.     Appearance: He is ill-appearing and diaphoretic.      Comments: Frail. Elderly male    Cardiovascular:      Rate and Rhythm: Normal rate and regular  rhythm.   Pulmonary:      Effort: Pulmonary effort is normal.   Musculoskeletal:         General: Swelling present.      Right lower leg: No edema.      Comments: Contracted    Skin:     General: Skin is warm.      Coloration: Skin is pale.             Alzheimer's dementia      Assessment/Plan:   Maxmi Henson is a 74 y.o. male admitted to inpatient hospice 07/29/2025 with diagnosis of Alzheimer's dementia [G30.9, F02.80]  for symptom management.     Symptoms:  Pain   Anxiety   Terminal secretions     Discharge Disposition: home with hospice when symptoms are managed; however, he is unlikely to survive to hospital discharge.     Pain   -Add morphine 2 mg q 6 hrs atc   -Continue morphine 2 mg q 1 hr PRN     Anxiety   -Versed PRN     Terminal secretions   -Add robinul 0.4 mg q 6 hrs atc   -Robinul 0.4 mg PRN   -Scopolamine patch PRN     Patient comfort   -Leal   -Turn and reposition q 2 hrs and PRN   -Palliative oral rinse   -Eye gtts     Goals of care   -DNR/DNI  -Comfort measures     Total Visit Time: 20 min   Face to Face Time: 10 min     Justification for care:  Patient meets criteria for acute in-patient care due to need for frequent skilled nursing assessments to determine patient comfort and medication effectiveness at end of life.  Frequent adjustments to medications and interventions for symptom management, including injectable medications.      Bita Larose, MSN, APRN  [facility]  Hospice and Palliative Care Nurse Practitioner  07/30/25  13:06 EDT

## 2025-07-30 NOTE — PROGRESS NOTES
Continued Stay Note  [facility]     Patient Name: Maxim Henson  MRN: [id number]  Today's Date: 7/30/2025    Admit Date: 7/29/2025    Plan: Inpatient hospice   Discharge Plan       Row Name 07/30/25 0935       Plan    Plan Inpatient hospice    Plan Comments 10% PPS. Patient resting peacefully with eyes closed. Unresponsive to voice and touch during assessment. Patient has required 3 prns of Morphine 2mg and 2 prns of Robinul 0.4mg in the past 24 hours. He continues to require inpatient hospice for skilled nursing assessment, medication titration, and injectable medication administration for palliation of pain, dyspnea, and secretions. Called spouse, Sol to provide an update. Left a  with hospice callback number.                    Discharge Codes    No documentation.                       Yolanda Macias RN

## 2025-07-31 NOTE — PROGRESS NOTES
Hospice Progress Note    Patient Name: Maxim Henson   : 1950  Gender: male    Code Status: comfort measures    Date of Admission: 2025    Subjective:  Maxim Henson is a 74 y.o. male admitted to inpatient hospice 2025 with diagnosis of Alzheimer's dementia [G30.9, F02.80]  for symptom management.     No family today.  Increased terminal secretions.      - Scheduled:  Palliative oral rinse   Morphine 2 mg q 6 hrs   Robinul 0.4 mg q 6 hrs     - PRNs:  Robinul 0.4 mg x 2   Morphine 2 mg x 2      - Intake/Output  Intake & Output (last 3 days)          07 07 07 07 07 07 07    Urine   0     Total Output   0     Net   0             Urine Unmeasured Occurrence 4 x       Stool Unmeasured Occurrence 2 x                  ROS:  Review of Systems   Unable to perform ROS: Acuity of condition       Reviewed current scheduled and prn medications for route, type, dose and frequency.       acetaminophen **OR** acetaminophen **OR** acetaminophen    bisacodyl    diazePAM    furosemide    glycopyrrolate    midazolam    Morphine    ondansetron    palliative care oral rinse    Polyvinyl Alcohol-Povidone PF    Scopolamine    sodium chloride    Objective:   /66 (BP Location: Right arm, Patient Position: Lying)   Pulse 74   Temp 98.1 °F (36.7 °C) (Axillary)   Resp 16   SpO2 94%      PPS: Palliative Performance Scale score as of 2025, 09:30 EDT is 10% based on the following measures:   Ambulation: Totally bed bound  Activity and Evidence of Disease: Unable to do any work, extensive evidence of disease  Self-Care: Total care  Intake:  Mouth care only  LOC: Drowsy or coma     Physical Exam:  Physical Exam  Nursing note reviewed. Exam conducted with a chaperone present.   Constitutional:       General: He is not in acute distress.     Appearance: He is ill-appearing and diaphoretic.      Comments: Frail. Elderly male    Cardiovascular:      Rate and  Rhythm: Normal rate and regular rhythm.   Pulmonary:      Effort: Pulmonary effort is normal.      Breath sounds: Rhonchi and rales present.      Comments: Audible terminal secretions   Musculoskeletal:         General: Swelling present.      Right lower leg: No edema.      Comments: Contracted    Skin:     General: Skin is warm.      Coloration: Skin is pale.             Alzheimer's dementia      Assessment/Plan:   Maxim Henson is a 74 y.o. male admitted to inpatient hospice 07/29/2025 with diagnosis of Alzheimer's dementia [G30.9, F02.80]  for symptom management.     Symptoms:  Pain   Anxiety   Terminal secretions     Discharge Disposition: home with hospice when symptoms are managed; however, he is unlikely to survive to hospital discharge.     Pain   -Continue morphine 2 mg q 6 hrs atc   -Continue morphine 2 mg q 1 hr PRN     Anxiety   -Versed PRN     Terminal secretions   -Add Lasix 20 mg q 6 hrs PRN   -Add robinul 0.4 mg q 6 hrs atc   -Robinul 0.4 mg PRN   -Scopolamine patch PRN     Patient comfort   -Leal   -Turn and reposition q 2 hrs and PRN   -Palliative oral rinse   -Eye gtts     Goals of care   -DNR/DNI  -Comfort measures     Total Visit Time: 20 min   Face to Face Time: 10 min     Justification for care:  Patient meets criteria for acute in-patient care due to need for frequent skilled nursing assessments to determine patient comfort and medication effectiveness at end of life.  Frequent adjustments to medications and interventions for symptom management, including injectable medications.      Bita Larose, MSN, APRN  [facility]  Hospice and Palliative Care Nurse Practitioner  08/01/25  09:30 EDT

## 2025-07-31 NOTE — PLAN OF CARE
Goal Outcome Evaluation:  Plan of Care Reviewed With: patient        Progress: declining  Outcome Evaluation: Pt arrived on the unit around 2300. Patient has FC draining urine. New 20G IV placed in left FA. Pt recieved versed x1, morphine x1, and scope patch placed behind left ear d/t secretions to this timestamp. Pt is now resting comfortably with q4 turns. Will continue with POC.

## 2025-07-31 NOTE — PROGRESS NOTES
Continued Stay Note  [facility]     Patient Name: Maxim Henson  MRN: [id number]  Today's Date: 7/31/2025    Admit Date: 7/29/2025    Plan: Inpatient hospice   Discharge Plan       Row Name 07/31/25 1724       Plan    Plan Inpatient hospice    Plan Comments JG 10%pps is a 73yo male with terminal diagnosis of Alzheimer’s dementia. Chart reviewed (LBM 7/29/25, PRN’s: robinul x1, morphine x2). Visit to bedside, assess completed. Patient observed unresponsive with relaxed face, jaw, body posture, respirations. Audible upper airway secretions. Warm to the touch. Rn reassures patient that he is safe and cared for. RN contacted patient’s spouse, Sol, and discussed assess, condition, eol signs/symptoms, medications for comfort, eol communication, what to possibly expect from this point forward, food/water, and self care. Rn provided for open communication and continued support. Sol verbalized understanding. Care coordinated with Fannie CALDERÓN [facility]. Updated V. Thuan VALADEZN. Patient continues to require injectable medications for symptom palliation necessitating skilled nursing care requiring gip level of hospice care.    Final Discharge Disposition Code 51 - hospice medical facility                   Discharge Codes    No documentation.                       Sophia Valdez RN

## 2025-08-01 NOTE — PLAN OF CARE
Goal Outcome Evaluation:              Outcome Evaluation: Pt on room air. Leal patent. PRN lasix given for audible secretions. Q4hr turns conducted. Pt only responsive to pain. Resting comfortably in between care. Plan of care ongoing.

## 2025-08-01 NOTE — PLAN OF CARE
Goal Outcome Evaluation:  Plan of Care Reviewed With: patient        Progress: declining  Outcome Evaluation: Pt remains on RA. Minimally responsive this shift. NPO with oral care performed as scheduled. Leal patent. 20g L FA flushed and saline locked. PRN morphine and versed given x1. Comfort care ongoing.

## 2025-08-01 NOTE — PROGRESS NOTES
Palliative Care Daily Progress Note     S:   The patient continues to be unresponsive at a PPS 10%.  He has terminal secretions with coarse rhonchi audible.  He has some restlessness and some grimacing.  I will adjust his medications for comfort.    O:   Palliative Performance Scale Score:     /66 (BP Location: Right arm, Patient Position: Lying)   Pulse 74   Temp 98.1 °F (36.7 °C) (Axillary)   Resp 16   SpO2 94%     Intake/Output Summary (Last 24 hours) at 8/1/2025 1350  Last data filed at 8/1/2025 0500  Gross per 24 hour   Intake --   Output 0 ml   Net 0 ml       PE:  General Appearance:    Chronically ill-appearing   HEENT:  Dry mucous membranes   Neck:   supple   Lungs:   Coarse rhonchi throughout with minimal airflow and prolonged expiratory phase    Heart:  Tachycardic   Abdomen:     Soft, NT, ND   Extremities: 1+ edema   Pulses:   Pulses weak   Skin: Wounds noted   Neurologic: Minimally responsive, some grimacing, no purposeful movement or eye-opening   Psych:   slightly restless         Meds: Reviewed    Labs:   Results from last 7 days   Lab Units 07/26/25  1243   HEMOGLOBIN g/dL 9.2*   HEMATOCRIT % 29.4*     Results from last 7 days   Lab Units 07/26/25  1243   SODIUM mmol/L 140     Results from last 7 days   Lab Units 07/26/25  1243   SODIUM mmol/L 140     Imaging Results (Last 72 Hours)       ** No results found for the last 72 hours. **              Diagnostics: Reviewed    A: 74-year-old male with end-stage Alzheimer's dementia now on hospice services with PPS 10% and declining.  Continues to have terminal secretions with audible rhonchi and evidence of being uncomfortable.      P: Will increase Robinul, and will schedule Versed and as needed dosing.  Will increase morphine frequency.  Continue to monitor and provide support.      We will continue to follow along. Please do not hesitate to contact us regarding further sx mgmt or GOC needs, including after hours or on weekends via our on call  provider at [phone].     Augusto Reyes MD    8/1/2025

## 2025-08-01 NOTE — PLAN OF CARE
Goal Outcome Evaluation:              Outcome Evaluation: Comfort care ongoing. Leal in place and patent. IV in place and patent. Scheduled and PRN medications given per MAR. Terminal secretions continue. Turns conducted Q4. Wife at bedside this afternoon. Pt appearrs to be resting comfortably. Continue comfort care.

## 2025-08-01 NOTE — PROGRESS NOTES
Continued Stay Note  [facility]     Patient Name: Maxim Henson  MRN: [id number]  Today's Date: 8/1/2025    Admit Date: 7/29/2025    Plan: Inpatient hospice   Discharge Plan       Row Name 08/01/25 0820       Plan    Plan Inpatient hospice    Plan Comments 10% PPS. Patient with abdominal respirations, audible secretions. RR 40/min. Skin hot to touch. Removed extra blanket. Asked nurse to give prns of Morphine, Versed, and Toradol. Patient has required the following prns in the past 24 hours: Lasix 20mg x1, Robinul 0.4mg x1, Versed 0.5mg x4, Morphine 2mg x3. New orders today: increased frequency of scheduled Morphine 2mg and Robinul 0.4mg to q4h, scheduled Versed 1mg q4h. Patient continues to require inpatient hospice for skilled nursing assessment, medication titration, and injectable medication administration for palliation of pain, dyspnea, anxiety, and secretions. Called wife, Sol with an update.                   Discharge Codes    No documentation.                       Yolanda Macias RN

## 2025-08-02 NOTE — SIGNIFICANT NOTE
Exam confirms with auscultation zero audible heart tones and zero audible respirations. Mr.John Henson was pronounced dead at 1855.  MD notified by Patient's RN.    Kendra Troncoso RN  Clinical House Supervisor  8/1/2025 20:07 EDT

## 2025-08-06 NOTE — DISCHARGE SUMMARY
Date of Admission: 07/29/2025   Date and Time of Death: 8/1/2025 at 6:55 PM    Hospital Course:  Maxim Henson is a 74 y.o. male with Alzheimer's dementia, atrial fibrillation, and gout who is hospitalized after a fall with head laceration that required suturing.  He was also found to be quite anemic and was given a transfusion.  Despite initial treatment he had a rapid decline at which time decision made for comfort measures with hospice services.  He was admitted to inpatient hospice with diagnosis of Alzheimer's dementia [G30.9, F02.80]  for symptom management of pain, and restlessness.  Medications were available throughout admission for symptom management and comfort. Patient continued to decline after admission as expected ultimately to their death on 8/1/2025 at 6:55 PM. Patient was pronounced dead by Clinical House Supervisor. Spiritual and psychosocial support were available to patient and family throughout admission. Patient's remains were released per [facility] protocol.       Augusto Reyes MD   [facility]  08/06/25  14:05 EDT